# Patient Record
Sex: FEMALE | Race: WHITE | NOT HISPANIC OR LATINO | Employment: OTHER | URBAN - NONMETROPOLITAN AREA
[De-identification: names, ages, dates, MRNs, and addresses within clinical notes are randomized per-mention and may not be internally consistent; named-entity substitution may affect disease eponyms.]

---

## 2017-01-27 RX ORDER — LISINOPRIL 10 MG/1
TABLET ORAL
Qty: 90 TABLET | Refills: 0 | Status: SHIPPED | OUTPATIENT
Start: 2017-01-27 | End: 2017-03-21 | Stop reason: SDUPTHER

## 2017-02-11 ENCOUNTER — APPOINTMENT (OUTPATIENT)
Dept: GENERAL RADIOLOGY | Facility: HOSPITAL | Age: 82
End: 2017-02-11

## 2017-02-11 ENCOUNTER — HOSPITAL ENCOUNTER (EMERGENCY)
Facility: HOSPITAL | Age: 82
Discharge: HOME | End: 2017-02-11
Attending: EMERGENCY MEDICINE | Admitting: EMERGENCY MEDICINE

## 2017-02-11 ENCOUNTER — APPOINTMENT (OUTPATIENT)
Dept: CT IMAGING | Facility: HOSPITAL | Age: 82
End: 2017-02-11

## 2017-02-11 VITALS
WEIGHT: 120 LBS | DIASTOLIC BLOOD PRESSURE: 76 MMHG | HEIGHT: 63 IN | SYSTOLIC BLOOD PRESSURE: 174 MMHG | HEART RATE: 80 BPM | BODY MASS INDEX: 21.26 KG/M2 | RESPIRATION RATE: 16 BRPM | TEMPERATURE: 98.3 F | OXYGEN SATURATION: 94 %
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
## 2017-02-11 DIAGNOSIS — R55 SYNCOPE, UNSPECIFIED SYNCOPE TYPE: ICD-10-CM

## 2017-02-11 LAB
ALBUMIN SERPL-MCNC: 4.1 G/DL (ref 3.5–5)
ALBUMIN/GLOB SERPL: 1.3 G/DL (ref 1–2)
ALP SERPL-CCNC: 94 U/L (ref 38–126)
ALT SERPL W P-5'-P-CCNC: 21 U/L (ref 13–69)
ANION GAP SERPL CALCULATED.3IONS-SCNC: 11.4 MMOL/L
AST SERPL-CCNC: 34 U/L (ref 15–46)
BASOPHILS # BLD AUTO: 0.02 10*3/MM3 (ref 0–0.2)
BASOPHILS NFR BLD AUTO: 0.2 % (ref 0–2.5)
BILIRUB SERPL-MCNC: 1 MG/DL (ref 0.2–1.3)
BILIRUB UR QL STRIP: NEGATIVE
BUN BLD-MCNC: 17 MG/DL (ref 7–20)
BUN/CREAT SERPL: 24.3 (ref 7.1–23.5)
CALCIUM SPEC-SCNC: 9.2 MG/DL (ref 8.4–10.2)
CHLORIDE SERPL-SCNC: 99 MMOL/L (ref 98–107)
CLARITY UR: CLEAR
CO2 SERPL-SCNC: 32 MMOL/L (ref 26–30)
COLOR UR: YELLOW
CREAT BLD-MCNC: 0.7 MG/DL (ref 0.6–1.3)
DEPRECATED RDW RBC AUTO: 50.5 FL (ref 37–54)
EOSINOPHIL # BLD AUTO: 0.02 10*3/MM3 (ref 0–0.7)
EOSINOPHIL NFR BLD AUTO: 0.2 % (ref 0–7)
ERYTHROCYTE [DISTWIDTH] IN BLOOD BY AUTOMATED COUNT: 15.1 % (ref 11.5–14.5)
GFR SERPL CREATININE-BSD FRML MDRD: 78 ML/MIN/1.73
GLOBULIN UR ELPH-MCNC: 3.2 GM/DL
GLUCOSE BLD-MCNC: 125 MG/DL (ref 74–98)
GLUCOSE UR STRIP-MCNC: NEGATIVE MG/DL
HCT VFR BLD AUTO: 39.2 % (ref 37–47)
HGB BLD-MCNC: 12.8 G/DL (ref 12–16)
HGB UR QL STRIP.AUTO: NEGATIVE
HOLD SPECIMEN: NORMAL
HOLD SPECIMEN: NORMAL
IMM GRANULOCYTES # BLD: 0.05 10*3/MM3 (ref 0–0.06)
IMM GRANULOCYTES NFR BLD: 0.5 % (ref 0–0.6)
KETONES UR QL STRIP: ABNORMAL
LEUKOCYTE ESTERASE UR QL STRIP.AUTO: NEGATIVE
LYMPHOCYTES # BLD AUTO: 0.84 10*3/MM3 (ref 0.6–3.4)
LYMPHOCYTES NFR BLD AUTO: 9.2 % (ref 10–50)
MCH RBC QN AUTO: 29.7 PG (ref 27–31)
MCHC RBC AUTO-ENTMCNC: 32.7 G/DL (ref 30–37)
MCV RBC AUTO: 91 FL (ref 81–99)
MONOCYTES # BLD AUTO: 0.72 10*3/MM3 (ref 0–0.9)
MONOCYTES NFR BLD AUTO: 7.9 % (ref 0–12)
NEUTROPHILS # BLD AUTO: 7.52 10*3/MM3 (ref 2–6.9)
NEUTROPHILS NFR BLD AUTO: 82 % (ref 37–80)
NITRITE UR QL STRIP: NEGATIVE
NRBC BLD MANUAL-RTO: 0 /100 WBC (ref 0–0)
PH UR STRIP.AUTO: 6 [PH] (ref 5–8)
PLATELET # BLD AUTO: 251 10*3/MM3 (ref 130–400)
PMV BLD AUTO: 10 FL (ref 6–12)
POTASSIUM BLD-SCNC: 4.4 MMOL/L (ref 3.5–5.1)
PROT SERPL-MCNC: 7.3 G/DL (ref 6.3–8.2)
PROT UR QL STRIP: NEGATIVE
RBC # BLD AUTO: 4.31 10*6/MM3 (ref 4.2–5.4)
SODIUM BLD-SCNC: 138 MMOL/L (ref 137–145)
SP GR UR STRIP: 1.01 (ref 1–1.03)
TROPONIN I SERPL-MCNC: <0.012 NG/ML (ref 0–0.03)
UROBILINOGEN UR QL STRIP: ABNORMAL
WBC NRBC COR # BLD: 9.17 10*3/MM3 (ref 4.8–10.8)
WHOLE BLOOD HOLD SPECIMEN: NORMAL
WHOLE BLOOD HOLD SPECIMEN: NORMAL

## 2017-02-11 PROCEDURE — 85025 COMPLETE CBC W/AUTO DIFF WBC: CPT | Performed by: EMERGENCY MEDICINE

## 2017-02-11 PROCEDURE — 84484 ASSAY OF TROPONIN QUANT: CPT | Performed by: EMERGENCY MEDICINE

## 2017-02-11 PROCEDURE — 71020 HC CHEST PA AND LATERAL: CPT

## 2017-02-11 PROCEDURE — 72125 CT NECK SPINE W/O DYE: CPT

## 2017-02-11 PROCEDURE — 81003 URINALYSIS AUTO W/O SCOPE: CPT | Performed by: EMERGENCY MEDICINE

## 2017-02-11 PROCEDURE — 70450 CT HEAD/BRAIN W/O DYE: CPT

## 2017-02-11 PROCEDURE — 93005 ELECTROCARDIOGRAM TRACING: CPT | Performed by: EMERGENCY MEDICINE

## 2017-02-11 PROCEDURE — 99284 EMERGENCY DEPT VISIT MOD MDM: CPT

## 2017-02-11 PROCEDURE — 80053 COMPREHEN METABOLIC PANEL: CPT | Performed by: EMERGENCY MEDICINE

## 2017-02-11 RX ORDER — HYDROCODONE BITARTRATE AND ACETAMINOPHEN 5; 325 MG/1; MG/1
1 TABLET ORAL ONCE
Status: COMPLETED | OUTPATIENT
Start: 2017-02-11 | End: 2017-02-11

## 2017-02-11 RX ORDER — SODIUM CHLORIDE 0.9 % (FLUSH) 0.9 %
10 SYRINGE (ML) INJECTION AS NEEDED
Status: DISCONTINUED | OUTPATIENT
Start: 2017-02-11 | End: 2017-02-11 | Stop reason: HOSPADM

## 2017-02-11 RX ORDER — NAPROXEN 375 MG/1
375 TABLET ORAL 2 TIMES DAILY PRN
Qty: 14 TABLET | Refills: 0 | Status: SHIPPED | OUTPATIENT
Start: 2017-02-11 | End: 2017-08-21

## 2017-02-11 RX ADMIN — HYDROCODONE BITARTRATE AND ACETAMINOPHEN 1 TABLET: 5; 325 TABLET ORAL at 16:26

## 2017-02-11 NOTE — ED PROVIDER NOTES
TRIAGE CHIEF COMPLAINT:   Chief Complaint   Patient presents with   • Fall      HPI: Adelia Maurice is a 89 y.o. female who presents to the emergency department complaining of a fall or syncopal event earlier this morning.  Patient states she just woke up on the floor.  Does not recall becoming lightheaded or dizzy, does not recall any chest pain or shortness of breath.  Has not been sick lately.  Family states that she has had a few falls over the past few weeks.  They're unsure of the cause.  They state at one point they know she tripped over her foot and fell but are unsure what happened today.  Patient did hit the back of her head on the floor.  Patient's only complaint is slight headache on arrival.  She does take aspirin at home.       REVIEW OF SYSTEMS: Otherwise negative unless stated above     PAST MEDICAL HISTORY:   Past Medical History   Diagnosis Date   • Dementia    • Dyspnea    • Hypertension    • Macular degeneration 6/23/2016   • Syncope         FAMILY HISTORY:   Family History   Problem Relation Age of Onset   • Cancer Mother    • Diabetes Mother    • Heart attack Father    • Cancer Sister         SOCIAL HISTORY:   Social History     Social History   • Marital status:      Spouse name: N/A   • Number of children: N/A   • Years of education: N/A     Occupational History   • Not on file.     Social History Main Topics   • Smoking status: Never Smoker   • Smokeless tobacco: Not on file   • Alcohol use No   • Drug use: No   • Sexual activity: Not on file     Other Topics Concern   • Not on file     Social History Narrative   • No narrative on file        SURGICAL HISTORY:   Past Surgical History   Procedure Laterality Date   • Appendectomy     • Colon surgery          CURRENT MEDICATIONS:      Medication List      ASK your doctor about these medications          amLODIPine 5 MG tablet   Commonly known as:  NORVASC   Take 1 tablet by mouth Daily.       aspirin 325 MG tablet       citalopram 20  MG tablet   Commonly known as:  CeleXA   Take 1 tablet by mouth daily.       donepezil 10 MG tablet   Commonly known as:  ARICEPT   Take 2 tablets by mouth Every Night.       latanoprost 0.005 % ophthalmic solution   Commonly known as:  XALATAN       lisinopril 10 MG tablet   Commonly known as:  PRINIVIL,ZESTRIL   TAKE ONE TABLET BY MOUTH DAILY       PRESERVISION AREDS PO       Turmeric Curcumin 500 MG capsule            ALLERGIES: Other     PHYSICAL EXAM:   VITAL SIGNS:   Vitals:    02/11/17 1148   BP: 169/65   Pulse: 75   Resp: 16   Temp: 98.3 °F (36.8 °C)   SpO2: 96%      CONSTITUTIONAL: Awake, oriented, appears non-toxic   HENT: There is a small soft tissue hematoma on the posterior scalp, normocephalic, oral mucosa pink and moist, airway patent. Nares patent without drainage. External ears normal.   EYES: Conjunctiva clear, EOMI, PERRL   NECK: Trachea midline, non-tender, supple   CARDIOVASCULAR: Normal heart rate, Normal rhythm, No murmurs, rubs, gallops   PULMONARY/CHEST: Clear to auscultation, no rhonchi, wheezes, or rales. Symmetrical breath sounds. Non-tender.   ABDOMINAL: Non-distended, soft, non-tender - no rebound or guarding. BS normal.   NEUROLOGIC: Non-focal, moving all four extremities, no gross sensory or motor deficits.  Normal strength and sensation in upper and lower extremities bilaterally.  Face is symmetrical with smiling, no facial droop noted, finger to nose testing appropriate.  EXTREMITIES: No clubbing, cyanosis, or edema   SKIN: Warm, Dry, No erythema, No rash     ED COURSE / MEDICAL DECISION MAKING:   Adelia Maurice is a 89 y.o. female who presents to the emergency department for evaluation of a fall and/or syncopal event earlier today.  Patient is asymptomatic on arrival.  Exam reveals a slight contusion on the posterior scalp, no laceration or abrasion is seen in this area.  EKG was obtained on arrival and reveals sinus rhythm with a rate of 72 bpm.  There are no acute ST segment  or T-wave abnormalities.  We'll obtain labs and imaging for further evaluation.  Chest x-ray per my interpretation is unremarkable.  CT scan of the cervical spine and the head did not reveal any acute abnormalities.  Laboratory tests including a urinalysis and cardiac enzymes are clinically unremarkable.  Patient observed in the emergency department with no further episodes.  Is difficult to say whether patient fell down or had a syncopal episode.  Patient does have a history of some venous stasis and has had some issues with falling before.  Given patient remained stable I think she is safe for discharged home but have asked patient follow-up with her cardiologist as well as her primary care physician within the next few days.  Family patient were agreeable to this plan and were discharged in good condition.    DECISION TO DISCHARGE/ADMIT: see ED care timeline     FINAL IMPRESSION:   1 -- syncope   2 --   3 --     Electronically signed by: Jazmyne Mcintyre MD, 2/11/2017 12:11 PM       Jazmyne Mcintyre MD  02/11/17 1555

## 2017-02-11 NOTE — DISCHARGE INSTRUCTIONS
"Most recent vital signs:   Visit Vitals   • /76   • Pulse 80   • Temp 98.3 °F (36.8 °C) (Oral)   • Resp 16   • Ht 63\" (160 cm)   • Wt 120 lb (54.4 kg)   • SpO2 94%   • BMI 21.26 kg/m2        Below you fill find any summaries of imaging results and further discharge instructions as discussed during your visit.     CT Cervical Spine Without Contrast   Final Result   Authenticated by Ashok Grigsby MD on 02/11/2017 01:42:28 PM      CT Head Without Contrast   Final Result   No acute intracranial abnormality.      Authenticated by Ashok Grigsby MD on 02/11/2017 01:23:36 PM      XR Chest 2 View    (Results Pending)        --PLEASE READ THESE DIRECTIONS IN FULL--     Our goal is to provide the best care we can AND to find any medical or surgical emergency while you are in the ER. If a medical or surgical emergency was not identified during your visit (or if the issue was resolved during the visit), following these directions for follow-up with a primary care provider and/or specialists and following the return precautions will be the safest and most effective way to protect your health after leaving the emergency room.     Therefore, in addition to the conversation we had in the room, please read all of the information in these discharge instructions, take medications as directed, and follow-up as directed.     You should seek immediate medical help for:     Worsening pain that is not helped with prescribed pain medicines and/or the recommended doses of over the counter pain medicines such as acetaminophen (Tylenol) or ibuprofen (Motrin/Advil)*.   New or worsening chest pain or trouble breathing   New or worsening headache, confusion, weakness, or visual changes   New or worsening fever (>100.4 F) that does not improve with the recommended doses of over the counter fever treatments such as acetaminophen (Tylenol) or ibuprofen (Motrin/Advil)* for more than a few hours.   Any other concerns that you feel could " "be life, limb, or eye-sight threatening     As a reminder, if you received any medicines or prescriptions for medicines that may be sedating (\"narcotics\", \"opioids\"), do NOT:   - operate any vehicles   - take if you are already tired   - take if you have had or plan to have alcohol   - perform other responsibilities that require your full attention.     Common medications include, but are not limited to:   Opiates: Morphine, Norco, Lortab, Vicodin, Percocet, oxycodone, hydrocodone, Dilaudid, hydromorphone   Benzodiazepines: Ativan, Valium, alprazolam, lorazepam, diazepam,   Other sedating medications: promethazine, Phenergan, trazodone, Benadryl, hydroxyzine, Vistaril, diphenhydramine, zolpidem, and Ambien     *If you have any history of GI (stomach/intestinal) bleeding, allergic reactions, kidney problems, and/or certain heart problems or there is any chance you could be pregnant, and have been told to avoid NSAIDs (ibuprofen, naproxen, Aleve, Motrin, Advil) please avoid these medications. Please remember that prescribed pain medicines often contain Tylenol/acetaminophen, so make sure your total daily (24 hour) intake does not exceed 4 grams or 4000mg.    "

## 2017-02-15 ENCOUNTER — OFFICE VISIT (OUTPATIENT)
Dept: INTERNAL MEDICINE | Facility: CLINIC | Age: 82
End: 2017-02-15

## 2017-02-15 ENCOUNTER — OFFICE VISIT (OUTPATIENT)
Dept: CARDIOLOGY | Facility: CLINIC | Age: 82
End: 2017-02-15

## 2017-02-15 VITALS
DIASTOLIC BLOOD PRESSURE: 72 MMHG | BODY MASS INDEX: 20.48 KG/M2 | WEIGHT: 115.6 LBS | HEART RATE: 76 BPM | HEIGHT: 63 IN | SYSTOLIC BLOOD PRESSURE: 120 MMHG

## 2017-02-15 VITALS
BODY MASS INDEX: 20.62 KG/M2 | WEIGHT: 116.38 LBS | OXYGEN SATURATION: 94 % | TEMPERATURE: 97.8 F | HEART RATE: 75 BPM | HEIGHT: 63 IN | SYSTOLIC BLOOD PRESSURE: 120 MMHG | DIASTOLIC BLOOD PRESSURE: 70 MMHG

## 2017-02-15 DIAGNOSIS — I10 ESSENTIAL HYPERTENSION: Primary | ICD-10-CM

## 2017-02-15 DIAGNOSIS — R55 SYNCOPE AND COLLAPSE: Primary | ICD-10-CM

## 2017-02-15 DIAGNOSIS — I10 ESSENTIAL HYPERTENSION: ICD-10-CM

## 2017-02-15 DIAGNOSIS — R55 SYNCOPE, UNSPECIFIED SYNCOPE TYPE: ICD-10-CM

## 2017-02-15 PROCEDURE — 99214 OFFICE O/P EST MOD 30 MIN: CPT | Performed by: INTERNAL MEDICINE

## 2017-02-15 PROCEDURE — 99213 OFFICE O/P EST LOW 20 MIN: CPT | Performed by: INTERNAL MEDICINE

## 2017-02-15 PROCEDURE — 96127 BRIEF EMOTIONAL/BEHAV ASSMT: CPT | Performed by: INTERNAL MEDICINE

## 2017-02-15 RX ORDER — AMLODIPINE BESYLATE 2.5 MG/1
2.5 TABLET ORAL DAILY
Qty: 30 TABLET | Refills: 5 | Status: SHIPPED | OUTPATIENT
Start: 2017-02-15 | End: 2017-06-20 | Stop reason: SDUPTHER

## 2017-02-15 NOTE — PROGRESS NOTES
Chief Complaint   Patient presents with   • Follow-up     from ER visit for multiple falls. Dr. Velez has suggested that Pt takes 2.5mg of Amlodpine.      Subjective   Adelia Maurice is a 89 y.o. female.     HPI Comments: Pt is here for ER followup after syncopal episode and fall.  She was standing at her kitchen counter making a cup of coffee and next thing she knew she was laying on her back on the floor.  She denies any recollection of this event.  She denies any chest pain or palpitations prior to event.  Her son came over and found her lying on the floor just awakening from event.  Apparently she has had a few falls over past few weeks.  Fell out of bed one day.  If she gets up to fast she falls backwards.    She often has dizziness with changes in position.  One fall was due to tripping over her own foot.  Son states she has been drinking more fluids.  She has been eating regular meals throughout the day.  Family checks on her frequently.   ER did full workup including CT of head and cervical spine, EKG, cardiac labs, UA, other labs- all were normal/negative for any cause, although CT cervical spine does show severe DDD of spine.  She was seen by Dr Velez this morning and he feels this could be due to orthostatic hypotension and recommends decrease in amlodipine to 2.5 mg q day.    She is complaining of pain over her right chest wall.   Has a bruise over back of her head.  Has some pain between shoulder blades.  She is SOB at times.  No CP or edema.     PHQ 9 score is 11 today.  Pt is on celexa for depression.   She denies depression, but her son says she often states she would be better if she went on.  Son states she is bothered by her memory.         The following portions of the patient's history were reviewed and updated as appropriate: allergies, current medications, past family history, past medical history, past social history, past surgical history and problem list.    Review of Systems  "  Respiratory: Negative for shortness of breath.    Cardiovascular: Negative for chest pain, palpitations and leg swelling.   Musculoskeletal:        Right chest wall pain  Pain between shoulder blades and mid back   Neurological: Positive for dizziness.   Psychiatric/Behavioral: Positive for confusion and dysphoric mood.   All other systems reviewed and are negative.      Objective   Visit Vitals   • /70  Comment: standing   • Pulse 75   • Temp 97.8 °F (36.6 °C)   • Ht 63\" (160 cm)   • Wt 116 lb 6 oz (52.8 kg)   • SpO2 94%   • BMI 20.61 kg/m2     Body mass index is 20.61 kg/(m^2).  Physical Exam   Constitutional: She is oriented to person, place, and time. She appears well-developed and well-nourished.   HENT:   Head: Normocephalic and atraumatic.   Mouth/Throat: Oropharynx is clear and moist.   Eyes: Conjunctivae and EOM are normal. Pupils are equal, round, and reactive to light.   Neck: Normal range of motion. Neck supple. No thyromegaly present.   Cardiovascular: Normal rate, regular rhythm, normal heart sounds and intact distal pulses.    No murmur heard.  Pulmonary/Chest: Effort normal and breath sounds normal. No respiratory distress.   Abdominal: Soft. Bowel sounds are normal.   Musculoskeletal: She exhibits no edema.   Right upper chest wall pain over the third fourth and fifth costochondral joints   Lymphadenopathy:     She has no cervical adenopathy.   Neurological: She is alert and oriented to person, place, and time. No cranial nerve deficit.   Skin:   Approximately 3 cm hematoma noted over right posterior scalp with surrounding ecchymosis   Psychiatric: Judgment normal.   Flat affect and depressed mood   Nursing note and vitals reviewed.      Assessment/Plan   Adelia Maurice is here today and the following problems have been addressed:      Adelia was seen today for follow-up.    Diagnoses and all orders for this visit:    Essential hypertension    Syncope, unspecified syncope type    Other " orders  -     amLODIPine (NORVASC) 2.5 MG tablet; Take 1 tablet by mouth Daily.    Decrease amlodipine to 2.5 mg q day  Check BP often and record  Apply heat to head and chest wall  Change positions slowly  Increase fluids  PH Q9 score is 11, patient is currently on Celexa at 20 mg and does not want to change dose    RTC 4 weeks

## 2017-02-15 NOTE — PROGRESS NOTES
Adelia POPE Maurice  4/29/1922  [phone]  [phone]    02/15/17     PCP: Marilyn K. Vermeesch, MD  [address] [place] [id number]    IDENTIFICATION: A 89 y.o. female retired [employer] , , and [facility]aker [facility] [place].    Chief Complaint   Patient presents with   • Loss of Consciousness       Patient Active Problem List    Diagnosis   • BMI 29.0-29.9,adult [Z68.29]   • Macular degeneration [H35.30]   • Arthritis of hip [M19.90]   • Breast pain, left [N64.4]   • Carotid bruit [R09.89]   • Fatigue [R53.83]   • Limb pain [M79.609]   • Depression [F32.9]   • Essential hypertension [I10]   • Memory loss [R41.3]   • Neuralgia [M79.2]   • Neck pain, acute [M54.2]     PROBLEM LIST:  1. Dyspnea:  a. June 2011: D-dimer 1700 with negative CTA for pulmonary embolus.   2. Syncope and collapse:  a. Holter, June 2011 with occasional PAC/PVC, no tachy or bradyarrhythmias to account of syncopal episode.  B. Carotid duplex 12/15: Bilateral mild-mod. Plaque without hemodynamically significant stenosis.  C. Syncope and fall 2/11/17, eval at [facility] home.  3. Hypertension, presumed essential.  4. Unknown lipid status.  5. Remote valve surgery, 1996 and 2007.    Allergies  Allergies   Allergen Reactions   • Other        Current Medications    Current Outpatient Prescriptions:   •  amLODIPine (NORVASC) 5 MG tablet, Take 1 tablet by mouth Daily., Disp: 30 tablet, Rfl: 5  •  aspirin 325 MG tablet, Take 162.5 mg by mouth daily., Disp: , Rfl:   •  citalopram (CeleXA) 20 MG tablet, Take 1 tablet by mouth daily. (Patient taking differently: Take 20 mg by mouth 2 (Two) Times a Day.), Disp: 30 tablet, Rfl: 5  •  donepezil (ARICEPT) 10 MG tablet, Take 2 tablets by mouth Every Night., Disp: 60 tablet, Rfl: 5  •  latanoprost (XALATAN) 0.005 % ophthalmic solution, Administer 1 drop to both eyes every night., Disp: , Rfl:   •  lisinopril (PRINIVIL,ZESTRIL) 10 MG tablet, TAKE ONE TABLET BY  MOUTH DAILY, Disp: 90 tablet, Rfl: 0  •  Multiple Vitamins-Minerals (PRESERVISION AREDS PO), Take  by mouth daily., Disp: , Rfl:   •  naproxen (NAPROSYN) 375 MG tablet, Take 1 tablet by mouth 2 (Two) Times a Day As Needed for mild pain (1-3)., Disp: 14 tablet, Rfl: 0  •  Turmeric Curcumin 500 MG capsule, Take  by mouth daily., Disp: , Rfl:     History of Present Illness   HPI  Patient presents in routine follow up.  Unfortunately had a syncopal episode with fall on Saturday, 02/11/2017 and reported to [facility] emergency department.  Patient reports that Saturday morning she was fixing a cup of coffee at her kitchen counter and then remembers waking up on her back on the floor.  She does not recall prodromal symptoms of palpitations, nausea, or diaphoresis.  She states that she was in her normal state of health prior to her fall.  She suffered a contusion to the back of her head.  She reports no CVA symptomatology is.  She does have the occasional skipped heartbeat but this is her normal and is unchanged at this time.  She does report body aches and chest discomfort and her thoracic wall, anterior thorax near right clavicle that is palpable.  She had no rib fractures on chest x-ray.  Otherwise she denies dyspnea, dyspnea on exertion, or significant lower extremity edema.  She had been inconsistent with her compression stocking use prior to the fall, but son present with her today states that she's been wearing them daily.  She does drink plenty of fluids.  She did not miss any meals.  No new change in medications.  She does report frequent presyncopal symptoms with positional changes especially from lying to sitting up.    ROS:  All systems have been reviewed and are negative with the exception of those mentioned in the HPI.    OBJECTIVE:  Vitals:    02/15/17 0909 02/15/17 0910   BP: 128/78 120/72   BP Location: Right arm Right arm   Patient Position: Sitting Standing   Pulse: 76    Weight: 115 lb 9.6  "oz (52.4 kg)    Height: 63\" (160 cm)      Physical Exam   Constitutional: She is oriented to person, place, and time. She appears well-developed and well-nourished.   Neck: Normal range of motion. Neck supple. No hepatojugular reflux and no JVD present. Carotid bruit is not present. No tracheal deviation present. No thyromegaly present.   Cardiovascular: Normal rate, regular rhythm, S1 normal, S2 normal, intact distal pulses and normal pulses.   Occasional extrasystoles are present. PMI is not displaced.  Exam reveals no gallop, no distant heart sounds, no friction rub, no midsystolic click and no opening snap.    No murmur heard.  Pulses:       Radial pulses are 2+ on the right side, and 2+ on the left side.        Dorsalis pedis pulses are 2+ on the right side, and 2+ on the left side.        Posterior tibial pulses are 2+ on the right side, and 2+ on the left side.   Pulmonary/Chest: Effort normal and breath sounds normal. She has no wheezes. She has no rales.   Abdominal: Soft. Bowel sounds are normal. She exhibits no mass. There is no tenderness. There is no guarding.   Musculoskeletal: Normal range of motion. She exhibits no edema or tenderness.   Varicosities bilateral lower extremities.   Neurological: She is alert and oriented to person, place, and time.   Psychiatric: She has a normal mood and affect.   Nursing note and vitals reviewed.      Diagnostic Data:  Procedures  Reviewed lab data and ECG from her ED visit on 2/11/17.    ASSESSMENT:   Diagnosis Plan   1. Syncope and collapse     2. Essential hypertension         PLAN:  1. No signs of heart block or arrhythmia on ECG from ED on 2/11/17.  No specific findings on laboratory data to explain symptoms.  She has a long history of orthostatic hypotension and reports symptoms often with positional changes. I believe we need to provide permissive hypertension to prevent further recurrence. I provided written instructions to cut her amlodipine in half and " continue fluids and compression hose.   2. Would allow systolic pressure to range from 115 to 160 to prevent recurrent syncope. If pressures continue to stay low will eliminate amlodipine from her regimen, but may need to restart full dose if pressures consistently higher than 160.    Follow up with us in 6 months    Scribed for Avi Velez MD by DEJAN Sherman. 2/15/2017  10:04 AM   I, Avi Velez MD, personally performed the services described in this documentation as scribed by the above named individual in my presence, and it is both accurate and complete.  2/15/2017  10:59 AM    Marilyn K. Vermeesch, MD, thank you for referring Ms. Maurice for evaluation.  I have forwarded my electronically generated recommendations to you for review.  Please do not hesitate to call with any questions.      Avi Velez MD, FACC

## 2017-03-21 ENCOUNTER — OFFICE VISIT (OUTPATIENT)
Dept: INTERNAL MEDICINE | Facility: CLINIC | Age: 82
End: 2017-03-21

## 2017-03-21 VITALS
HEART RATE: 80 BPM | BODY MASS INDEX: 20.4 KG/M2 | HEIGHT: 63 IN | SYSTOLIC BLOOD PRESSURE: 140 MMHG | WEIGHT: 115.13 LBS | DIASTOLIC BLOOD PRESSURE: 50 MMHG | TEMPERATURE: 97.7 F | OXYGEN SATURATION: 95 %

## 2017-03-21 DIAGNOSIS — F32.A DEPRESSION, UNSPECIFIED DEPRESSION TYPE: ICD-10-CM

## 2017-03-21 DIAGNOSIS — I10 ESSENTIAL HYPERTENSION: Primary | ICD-10-CM

## 2017-03-21 PROCEDURE — 99213 OFFICE O/P EST LOW 20 MIN: CPT | Performed by: INTERNAL MEDICINE

## 2017-03-21 RX ORDER — LISINOPRIL 10 MG/1
10 TABLET ORAL DAILY
Qty: 90 TABLET | Refills: 1 | Status: SHIPPED | OUTPATIENT
Start: 2017-03-21 | End: 2017-06-20 | Stop reason: SDUPTHER

## 2017-03-21 RX ORDER — CITALOPRAM 20 MG/1
20 TABLET ORAL DAILY
Qty: 30 TABLET | Refills: 5 | Status: SHIPPED | OUTPATIENT
Start: 2017-03-21 | End: 2017-06-20 | Stop reason: SDUPTHER

## 2017-03-21 RX ORDER — DONEPEZIL HYDROCHLORIDE 10 MG/1
20 TABLET, FILM COATED ORAL NIGHTLY
Qty: 60 TABLET | Refills: 5 | Status: SHIPPED | OUTPATIENT
Start: 2017-03-21 | End: 2017-06-20 | Stop reason: SDUPTHER

## 2017-03-21 NOTE — PROGRESS NOTES
"Chief Complaint   Patient presents with   • Follow-up     4 weeks for HTN and depression. No new complaints.      Subjective   Adelia Maurice is a 89 y.o. female.     HPI Comments: Here for follow up of HTN and depression.   Last visit her amlodipine had been decreased to 2.5 mg q day due to orthostatic hypotension and risk of falls.  SBP is elevated today.  Home reads are running 156-205/60-80.    She still feels off balance with changes in position.  No further falls.  She does notice much difference in how she feels.  Her son notices she is more steady on her feet.  She is no longer on walker and is using a cane.  She does feel more steady on her feet.   No CP or SOB.  She does have RAMIREZ.  She does not do much activity, lays in bed a lot per son.   Son cannot remember her last pneumovax.   Her son feels her depression is improved with celexa.  She does not like to do puzzles/crosswords/reading to help memory.        The following portions of the patient's history were reviewed and updated as appropriate: allergies, current medications, past family history, past medical history, past social history, past surgical history and problem list.    Review of Systems   Respiratory:        Dyspnea on exertion   Cardiovascular: Negative for chest pain, palpitations and leg swelling.   Neurological:        Occasional loss of balance, however this is improved   Psychiatric/Behavioral: Positive for confusion.        Less depression and anxiety   All other systems reviewed and are negative.      Objective   Visit Vitals   • /50   • Pulse 80   • Temp 97.7 °F (36.5 °C)   • Ht 63\" (160 cm)   • Wt 115 lb 2 oz (52.2 kg)   • SpO2 95%   • BMI 20.39 kg/m2     Body mass index is 20.39 kg/(m^2).  Physical Exam   Constitutional: She appears well-developed and well-nourished.   Ambulates with use of a cane   HENT:   Head: Normocephalic and atraumatic.   Mouth/Throat: Oropharynx is clear and moist.   Eyes: Conjunctivae and EOM are " normal. Pupils are equal, round, and reactive to light.   Neck: Normal range of motion. Neck supple. No thyromegaly present.   Cardiovascular: Normal rate, regular rhythm, normal heart sounds and intact distal pulses.    No murmur heard.  Pulmonary/Chest: Effort normal and breath sounds normal. No respiratory distress.   Musculoskeletal: She exhibits no edema.   Lymphadenopathy:     She has no cervical adenopathy.   Neurological: She is alert. No cranial nerve deficit.   Psychiatric: Judgment normal.   Brighter affect and mood today   Nursing note and vitals reviewed.      Assessment/Plan   Adelia Maurice is here today and the following problems have been addressed:      Adelia was seen today for follow-up.    Diagnoses and all orders for this visit:    Essential hypertension    Depression, unspecified depression type    Other orders  -     donepezil (ARICEPT) 10 MG tablet; Take 2 tablets by mouth Every Night.  -     lisinopril (PRINIVIL,ZESTRIL) 10 MG tablet; Take 1 tablet by mouth Daily.  -     citalopram (CeleXA) 20 MG tablet; Take 1 tablet by mouth Daily.    Follow heart healthy/low salt diet  Avoid processed foods  Monitor blood pressure as discussed  Take all medications as prescribed  Depression appears improved on current med  Recommend work on puzzles/read  Will try to find pneumovax documentation in old chart  Son is to bring in home blood pressure cuff to check for accuracy    RTC 3 mo      Please note that portions of this note were completed with a voice recognition program.  Efforts were made to edit dictation, but occasionally words are mistranscribed.

## 2017-05-15 ENCOUNTER — TELEPHONE (OUTPATIENT)
Dept: INTERNAL MEDICINE | Facility: CLINIC | Age: 82
End: 2017-05-15

## 2017-06-20 ENCOUNTER — OFFICE VISIT (OUTPATIENT)
Dept: INTERNAL MEDICINE | Facility: CLINIC | Age: 82
End: 2017-06-20

## 2017-06-20 VITALS
WEIGHT: 116.13 LBS | HEART RATE: 70 BPM | DIASTOLIC BLOOD PRESSURE: 52 MMHG | SYSTOLIC BLOOD PRESSURE: 136 MMHG | OXYGEN SATURATION: 94 % | TEMPERATURE: 97.2 F | HEIGHT: 63 IN | BODY MASS INDEX: 20.58 KG/M2

## 2017-06-20 DIAGNOSIS — Z91.09 ENVIRONMENTAL ALLERGIES: ICD-10-CM

## 2017-06-20 DIAGNOSIS — F32.0 MILD SINGLE CURRENT EPISODE OF MAJOR DEPRESSIVE DISORDER (HCC): ICD-10-CM

## 2017-06-20 DIAGNOSIS — I10 ESSENTIAL HYPERTENSION: Primary | ICD-10-CM

## 2017-06-20 DIAGNOSIS — R41.3 MEMORY LOSS: ICD-10-CM

## 2017-06-20 PROCEDURE — 90732 PPSV23 VACC 2 YRS+ SUBQ/IM: CPT | Performed by: INTERNAL MEDICINE

## 2017-06-20 PROCEDURE — 99213 OFFICE O/P EST LOW 20 MIN: CPT | Performed by: INTERNAL MEDICINE

## 2017-06-20 PROCEDURE — G0009 ADMIN PNEUMOCOCCAL VACCINE: HCPCS | Performed by: INTERNAL MEDICINE

## 2017-06-20 RX ORDER — AMLODIPINE BESYLATE 2.5 MG/1
2.5 TABLET ORAL DAILY
Qty: 30 TABLET | Refills: 5 | Status: SHIPPED | OUTPATIENT
Start: 2017-06-20 | End: 2017-11-10 | Stop reason: SDUPTHER

## 2017-06-20 RX ORDER — LISINOPRIL 10 MG/1
10 TABLET ORAL DAILY
Qty: 90 TABLET | Refills: 1 | Status: SHIPPED | OUTPATIENT
Start: 2017-06-20 | End: 2018-01-18 | Stop reason: SDUPTHER

## 2017-06-20 RX ORDER — CITALOPRAM 20 MG/1
20 TABLET ORAL DAILY
Qty: 30 TABLET | Refills: 5 | Status: SHIPPED | OUTPATIENT
Start: 2017-06-20 | End: 2018-04-12 | Stop reason: SDUPTHER

## 2017-06-20 RX ORDER — DONEPEZIL HYDROCHLORIDE 10 MG/1
20 TABLET, FILM COATED ORAL NIGHTLY
Qty: 60 TABLET | Refills: 5 | Status: SHIPPED | OUTPATIENT
Start: 2017-06-20 | End: 2018-05-15 | Stop reason: SDUPTHER

## 2017-06-20 NOTE — PROGRESS NOTES
"Chief Complaint   Patient presents with   • Follow-up     3 months for depression and HTN. Son states Pt's nose is constantly running. Also states Pt shakes alot.       Subjective   Adelia Maurice is a 89 y.o. female.     HPI Comments: Here for followup of HTN, depression, dementia.   Her BP has been running better overall.  No lows or highs.  She has not had a fall since last visit.  Has not been having the dizzy spells as she has in the past.   Denies any CP or SOB.  She is wearing her compression stockings, has been sleeping in stockings.    She has been eating 3 meals a day, her weight has remained stable.   Her sister had Parkinsons disease.  She does have a tremor.  This tremor is worse in the mornings.  Has chronic sinus drainage, left worse than right.  She has been sneezing.  She has not been taking any antihistamines or decongestants for this.  Has glaucoma and is on eye drops.   She states she has been feeling depressed.        The following portions of the patient's history were reviewed and updated as appropriate: allergies, current medications, past family history, past medical history, past social history, past surgical history and problem list.    Review of Systems   Constitutional: Positive for fatigue. Negative for appetite change and fever.   HENT: Positive for congestion, postnasal drip and rhinorrhea.    Respiratory: Negative for shortness of breath.    Cardiovascular: Negative for chest pain, palpitations and leg swelling.   Allergic/Immunologic: Positive for environmental allergies.   Neurological: Positive for tremors.   Psychiatric/Behavioral: Positive for confusion and dysphoric mood.   All other systems reviewed and are negative.      Objective   /52  Pulse 70  Temp 97.2 °F (36.2 °C)  Ht 63\" (160 cm)  Wt 116 lb 2 oz (52.7 kg)  SpO2 94%  BMI 20.57 kg/m2  Body mass index is 20.57 kg/(m^2).  Physical Exam   Constitutional: She is oriented to person, place, and time. She appears " well-developed and well-nourished.   HENT:   Head: Normocephalic and atraumatic.   Mouth/Throat: Oropharynx is clear and moist.   Clear watery rhinorrhea, turbinates pale and boggy   Eyes: Conjunctivae and EOM are normal. Pupils are equal, round, and reactive to light.   Neck: Normal range of motion. Neck supple. No thyromegaly present.   Cardiovascular: Normal rate, regular rhythm, normal heart sounds and intact distal pulses.    No murmur heard.  Pulmonary/Chest: Effort normal and breath sounds normal. No respiratory distress.   Abdominal: Soft. Bowel sounds are normal.   Musculoskeletal: She exhibits no edema.   Compression stockings in place with no edema noted   Lymphadenopathy:     She has no cervical adenopathy.   Neurological: She is alert and oriented to person, place, and time. No cranial nerve deficit.   Bilateral hand tremor noted   Psychiatric: Judgment normal.   Flat affect and sad mood   Nursing note and vitals reviewed.      Assessment/Plan   Adelia Maurice is here today and the following problems have been addressed:      Adelia was seen today for follow-up.    Diagnoses and all orders for this visit:    Essential hypertension    Mild single current episode of major depressive disorder    Memory loss    Environmental allergies    Other orders  -     lisinopril (PRINIVIL,ZESTRIL) 10 MG tablet; Take 1 tablet by mouth Daily.  -     donepezil (ARICEPT) 10 MG tablet; Take 2 tablets by mouth Every Night.  -     citalopram (CeleXA) 20 MG tablet; Take 1 tablet by mouth Daily.  -     amLODIPine (NORVASC) 2.5 MG tablet; Take 1 tablet by mouth Daily.    Follow heart healthy/low salt diet  Avoid processed foods  Monitor blood pressure as discussed  Exercise as tolerated up to 30 minutes 5 days per week  Take all medications as prescribed  Recommend Zyrtec 10 mg daily at bedtime  Pneumovax given today  Encouragement and reassurance given    RTC 4 mo    Please note that portions of this note were completed with  a voice recognition program.  Efforts were made to edit dictation, but occasionally words are mistranscribed.

## 2017-06-23 ENCOUNTER — TELEPHONE (OUTPATIENT)
Dept: INTERNAL MEDICINE | Facility: CLINIC | Age: 82
End: 2017-06-23

## 2017-06-23 NOTE — TELEPHONE ENCOUNTER
Denny is wanting to know if it will be okay for his mother to take Melatonin at night to help her sleep. He states that she has her days and nights mixed up and thought this might help. He is concerned about other medications that she is taking. He would like a call back with this information.

## 2017-07-18 ENCOUNTER — TELEPHONE (OUTPATIENT)
Dept: INTERNAL MEDICINE | Facility: CLINIC | Age: 82
End: 2017-07-18

## 2017-07-18 DIAGNOSIS — B37.2 SKIN CANDIDIASIS: ICD-10-CM

## 2017-07-18 NOTE — TELEPHONE ENCOUNTER
PATIENT WENT TO [facility] ON Saturday FOR A YEAST INFECTION, AND THEY GAVE HER FLUCONAZOLE, AND SHE SEEMS TO BE DOING SOME BETTER ON HER FACE, BUT PATIENT'S SON DOESN'T THINK THAT THE MEDICATION IS HELPING AS FAST AS WHAT THE PROVIDER SAID IT WOULD AROUND HER VAGINAL/BUTTOCKS AREA AND LEG. SHE'S BEEN TAKING THE MEDICATION FOR 3 DAYS NOW. HE WOULD LIKE DR. VERMEESCH TO TAKE A LOOK AT WHAT THE PROVIDER SAID REGARDING TREATMENT AND IF THIS IS THE RIGHT APPROACH TO HER PROBLEM. PLEASE RETURN CALL.

## 2017-07-18 NOTE — TELEPHONE ENCOUNTER
It appears as though there was a refill on the tube of cream they were given.  I don't believe she will need anymore Diflucan tablets.

## 2017-07-18 NOTE — TELEPHONE ENCOUNTER
Fungal infections can take 1-2 weeks to clear.  I recommend he be patient and call me back next week if she is not better.  The medications given are correct if this was indeed a fungal infection.

## 2017-07-18 NOTE — TELEPHONE ENCOUNTER
Pt's son notified. Stated they only have one tube of cream and and one more pill. Does she need more of these?

## 2017-07-19 RX ORDER — CLOTRIMAZOLE 1 %
CREAM (GRAM) TOPICAL
Qty: 45 G | Refills: 2 | Status: SHIPPED | OUTPATIENT
Start: 2017-07-19 | End: 2017-08-21

## 2017-07-21 ENCOUNTER — TELEPHONE (OUTPATIENT)
Dept: INTERNAL MEDICINE | Facility: CLINIC | Age: 82
End: 2017-07-21

## 2017-07-25 ENCOUNTER — TELEPHONE (OUTPATIENT)
Dept: INTERNAL MEDICINE | Facility: CLINIC | Age: 82
End: 2017-07-25

## 2017-07-25 NOTE — TELEPHONE ENCOUNTER
PT'S SON IS ASKING IF SHE CAN DISCONTINUE THE CREAM SHE HAS BEEN USING FOR A YEAST INFECTION.    PLEASE ADVISE HIM

## 2017-07-25 NOTE — TELEPHONE ENCOUNTER
Pt's son advised that if the yeast infection is gone she shouldn't need it anymore. Son wanted to know if using a moisture blocking cream would be helpful in preventing this from happening again?

## 2017-08-21 ENCOUNTER — OFFICE VISIT (OUTPATIENT)
Dept: CARDIOLOGY | Facility: CLINIC | Age: 82
End: 2017-08-21

## 2017-08-21 VITALS
DIASTOLIC BLOOD PRESSURE: 58 MMHG | HEART RATE: 73 BPM | SYSTOLIC BLOOD PRESSURE: 170 MMHG | BODY MASS INDEX: 20.73 KG/M2 | WEIGHT: 117 LBS | HEIGHT: 63 IN

## 2017-08-21 DIAGNOSIS — I10 ESSENTIAL HYPERTENSION: Primary | ICD-10-CM

## 2017-08-21 DIAGNOSIS — R60.0 BILATERAL EDEMA OF LOWER EXTREMITY: ICD-10-CM

## 2017-08-21 PROCEDURE — 99213 OFFICE O/P EST LOW 20 MIN: CPT | Performed by: INTERNAL MEDICINE

## 2017-08-21 NOTE — PROGRESS NOTES
Adelia POPE [place]  4/29/1922  [phone]  [phone]    08/21/17     PCP: Marilyn K. Vermeesch, MD  [address] [place] [id number]    IDENTIFICATION: A 89 y.o. female retired [employer] , , and [facility] [place].    Chief Complaint   Patient presents with   • Palpitations   • Shortness of Breath       Patient Active Problem List    Diagnosis   • Environmental allergies [Z91.09]   • Syncope [R55]   • BMI 29.0-29.9,adult [Z68.29]   • Macular degeneration [H35.30]   • Arthritis of hip [M19.90]   • Carotid bruit [R09.89]   • Fatigue [R53.83]   • Depression [F32.9]   • Essential hypertension [I10]   • Memory loss [R41.3]   • Neuralgia [M79.2]     PROBLEM LIST:  1. Dyspnea:  a. June 2011: D-dimer 1700 with negative CTA for pulmonary embolus.   2. Syncope and collapse:  a. Holter, June 2011 with occasional PAC/PVC, no tachy or bradyarrhythmias to account of syncopal episode.  B. Carotid duplex 12/15: Bilateral mild-mod. Plaque without hemodynamically significant stenosis.  C. Syncope and fall 2/11/17, eval at [facility] and [facility] home.  3. Hypertension, presumed essential.  4. Unknown lipid status.  5. Remote valve surgery, 1996 and 2007.    Allergies  Allergies   Allergen Reactions   • Other        Current Medications    Current Outpatient Prescriptions:   •  amLODIPine (NORVASC) 2.5 MG tablet, Take 1 tablet by mouth Daily., Disp: 30 tablet, Rfl: 5  •  aspirin 81 MG tablet, Take 2 tablets by mouth Daily., Disp: , Rfl:   •  cetirizine (zyrTEC) 5 MG tablet, Take 5 mg by mouth Daily., Disp: , Rfl:   •  citalopram (CeleXA) 20 MG tablet, Take 1 tablet by mouth Daily., Disp: 30 tablet, Rfl: 5  •  donepezil (ARICEPT) 10 MG tablet, Take 2 tablets by mouth Every Night., Disp: 60 tablet, Rfl: 5  •  latanoprost (XALATAN) 0.005 % ophthalmic solution, Administer 1 drop to both eyes every night., Disp: , Rfl:   •  lisinopril (PRINIVIL,ZESTRIL) 10 MG tablet, Take 1 tablet by mouth  "Daily., Disp: 90 tablet, Rfl: 1  •  melatonin 5 MG tablet tablet, Take 5 mg by mouth Every Night., Disp: , Rfl:   •  Multiple Vitamins-Minerals (PRESERVISION AREDS PO), Take  by mouth 2 (Two) Times a Day., Disp: , Rfl:   •  Turmeric Curcumin 500 MG capsule, Take  by mouth 2 (Two) Times a Day., Disp: , Rfl:     History of Present Illness   Palpitations    Associated symptoms include shortness of breath.   Shortness of Breath       Patient presents in routine follow up.  Has fallen in the shower twice since last visit but no correlation to hypotension.  Little appetite and her her son is encouraging by mouth intake    ROS:  All systems have been reviewed and are negative with the exception of those mentioned in the HPI.    OBJECTIVE:  Vitals:    08/21/17 1039   BP: 170/58   BP Location: Right arm   Patient Position: Sitting   Pulse: 73   Weight: 117 lb (53.1 kg)   Height: 63\" (160 cm)     Physical Exam   Constitutional: She is oriented to person, place, and time. She appears well-developed and well-nourished.   Neck: Normal range of motion. Neck supple. No hepatojugular reflux and no JVD present. Carotid bruit is not present. No tracheal deviation present. No thyromegaly present.   Cardiovascular: Normal rate, regular rhythm, S1 normal, S2 normal, intact distal pulses and normal pulses.   Occasional extrasystoles are present. PMI is not displaced.  Exam reveals no gallop, no distant heart sounds, no friction rub, no midsystolic click and no opening snap.    No murmur heard.  Pulses:       Radial pulses are 2+ on the right side, and 2+ on the left side.        Dorsalis pedis pulses are 2+ on the right side, and 2+ on the left side.        Posterior tibial pulses are 2+ on the right side, and 2+ on the left side.   Pulmonary/Chest: Effort normal and breath sounds normal. She has no wheezes. She has no rales.   Abdominal: Soft. Bowel sounds are normal. She exhibits no mass. There is no tenderness. There is no guarding. "   Musculoskeletal: Normal range of motion. She exhibits no edema or tenderness.   Varicosities bilateral lower extremities.   Neurological: She is alert and oriented to person, place, and time.   Psychiatric: She has a normal mood and affect.   Nursing note and vitals reviewed.      Diagnostic Data:  Procedures  Reviewed lab data and ECG from her ED visit on 2/11/17.    ASSESSMENT:   Diagnosis Plan   1. Essential hypertension     2. Bilateral edema of lower extremity         PLAN:  1.Hypertension with permissive hypertension a loud systolic status at current.  She states at home typically 150 or lower.    2.  Lower extremity edema recommended zip up compression stockings for ease of use  Plan follow-up in 9 months

## 2017-10-31 ENCOUNTER — OFFICE VISIT (OUTPATIENT)
Dept: INTERNAL MEDICINE | Facility: CLINIC | Age: 82
End: 2017-10-31

## 2017-10-31 VITALS
WEIGHT: 113 LBS | BODY MASS INDEX: 20.02 KG/M2 | DIASTOLIC BLOOD PRESSURE: 68 MMHG | SYSTOLIC BLOOD PRESSURE: 132 MMHG | HEART RATE: 74 BPM | OXYGEN SATURATION: 96 % | TEMPERATURE: 98.1 F | HEIGHT: 63 IN

## 2017-10-31 DIAGNOSIS — Z91.09 ENVIRONMENTAL ALLERGIES: ICD-10-CM

## 2017-10-31 DIAGNOSIS — F32.A DEPRESSION, UNSPECIFIED DEPRESSION TYPE: ICD-10-CM

## 2017-10-31 DIAGNOSIS — I10 ESSENTIAL HYPERTENSION: Primary | ICD-10-CM

## 2017-10-31 DIAGNOSIS — R41.3 MEMORY LOSS: ICD-10-CM

## 2017-10-31 DIAGNOSIS — E55.9 VITAMIN D DEFICIENCY: ICD-10-CM

## 2017-10-31 PROCEDURE — 90662 IIV NO PRSV INCREASED AG IM: CPT | Performed by: INTERNAL MEDICINE

## 2017-10-31 PROCEDURE — G0008 ADMIN INFLUENZA VIRUS VAC: HCPCS | Performed by: INTERNAL MEDICINE

## 2017-10-31 PROCEDURE — 99214 OFFICE O/P EST MOD 30 MIN: CPT | Performed by: INTERNAL MEDICINE

## 2017-10-31 RX ORDER — MEGESTROL ACETATE 125 MG/ML
625 SUSPENSION ORAL DAILY
Qty: 1 EACH | Refills: 3 | Status: SHIPPED | OUTPATIENT
Start: 2017-10-31 | End: 2017-11-01

## 2017-10-31 RX ORDER — IPRATROPIUM BROMIDE 21 UG/1
1 SPRAY, METERED NASAL EVERY 12 HOURS
Qty: 1 EACH | Refills: 11 | Status: SHIPPED | OUTPATIENT
Start: 2017-10-31 | End: 2018-07-02

## 2017-10-31 NOTE — PROGRESS NOTES
"Chief Complaint   Patient presents with   • Follow-up     4 months for depression and HTN. Pt states she tends to not be hungry.     Subjective   Adelia Maurice is a 89 y.o. female.     HPI Comments: Here for followup of HTN, depression, dementia.   She has not been having much appetite for the past 3-4 months.   When she goes out to eat she does well.   She skips lunch if by herself.  She does not like to eat much if family brings it to her either- takes a few bites and says she is not hungry.  She drinks diet soda.   She lives alone, but her sons are there morning and night.  Goes to bed early, but does not sleep well.  She dozes a lot during the day.  She is taking melatonin at night.  Zyrtec does not help her allergies.  Does not sneeze but has a runny nose all the time.   BP has been running 130s/60-70s.  She denies CP, SOB or edema.  She denies abdominal pain, NVDC.  Has had a few episodes of fecal incontinence.   Does have urine incontinence.   Her tremor is about the same.  Has a sister with Parkinsons.  No recent fall.  Remains on Celexa, but does admit to depression.       The following portions of the patient's history were reviewed and updated as appropriate: allergies, current medications, past family history, past medical history, past social history, past surgical history and problem list.    Review of Systems   Constitutional: Positive for appetite change and unexpected weight change.   HENT: Positive for rhinorrhea.    Respiratory: Negative for shortness of breath.    Cardiovascular: Negative for chest pain, palpitations and leg swelling.   Gastrointestinal:        A few episodes of fecal incontinence   Genitourinary:        Urine incontinence   Neurological: Positive for tremors and weakness. Negative for headaches.   Psychiatric/Behavioral: Positive for confusion, dysphoric mood and sleep disturbance.       Objective   /68  Pulse 74  Temp 98.1 °F (36.7 °C)  Ht 63\" (160 cm)  Wt 113 lb " (51.3 kg)  SpO2 96%  BMI 20.02 kg/m2  Body mass index is 20.02 kg/(m^2).  Physical Exam   HENT:   Head: Normocephalic and atraumatic.   Mouth/Throat: Oropharynx is clear and moist.   Clear, watery rhinorrhea   Eyes: EOM are normal. Pupils are equal, round, and reactive to light.   Cardiovascular: Normal rate, regular rhythm, normal heart sounds and intact distal pulses.    No murmur heard.  Pulmonary/Chest: Effort normal and breath sounds normal.   Abdominal: Soft. Bowel sounds are normal. She exhibits no distension. There is no tenderness.   Neurological: She is alert.   Psychiatric:   Flat affect and depressed mood  Patient continues to tell her son she is a burden to him   Nursing note and vitals reviewed.      Assessment/Plan   Adelia Maurice is here today and the following problems have been addressed:      Adelia was seen today for follow-up.    Diagnoses and all orders for this visit:    Essential hypertension  -     CBC & Differential  -     Comprehensive Metabolic Panel    Depression, unspecified depression type  -     TSH    Environmental allergies    Memory loss  -     TSH    Vitamin D deficiency  -     Vitamin D 25 Hydroxy    Other orders  -     megestrol (MEGACE ES) 625 MG/5ML suspension; Take 5 mL by mouth Daily.  -     ipratropium (ATROVENT) 0.03 % nasal spray; 1 spray into each nostril Every 12 (Twelve) Hours.    Labs as noted  Given Atrovent nasal spray 1 spray each near a.m. and p.m. due to chronic allergies and congestion  Given Megace 625 mg once daily due to poor appetite and weight loss  Continue Celexa at current dose, may add Remeron at next office visit  Suspect vitamin D deficiency, will call son with lab results  Follow heart healthy/low salt diet  Avoid processed foods  Monitor blood pressure as discussed  Exercise as tolerated up to 30 minutes 5 days per week  Take all medications as prescribed  Flu shot given today    Return to clinic in 4 weeks for follow-up of weight loss    Please  note that portions of this note were completed with a voice recognition program.  Efforts were made to edit dictation, but occasionally words are mistranscribed.

## 2017-11-01 ENCOUNTER — TELEPHONE (OUTPATIENT)
Dept: INTERNAL MEDICINE | Facility: CLINIC | Age: 82
End: 2017-11-01

## 2017-11-01 RX ORDER — MIRTAZAPINE 7.5 MG/1
7.5 TABLET, FILM COATED ORAL NIGHTLY
Qty: 30 TABLET | Refills: 1 | Status: SHIPPED | OUTPATIENT
Start: 2017-11-01 | End: 2017-11-30 | Stop reason: SINTOL

## 2017-11-01 NOTE — TELEPHONE ENCOUNTER
PATIENT'S SON IS CALLING. HE WOULD LIKE TO KNOW IF THERE'S SOMETHING ELSE FOR HER APPETITE STIMULATE? HE CHECKED WITH THE PHARMACY AND THEY TOLD HIM IT WAS GOING TO BE $625.    PLEASE SEND TO MARGY

## 2017-11-02 LAB
25(OH)D3+25(OH)D2 SERPL-MCNC: 14.1 NG/ML (ref 30–100)
ALBUMIN SERPL-MCNC: 4.3 G/DL (ref 3.2–4.6)
ALBUMIN/GLOB SERPL: 2.2 {RATIO} (ref 1.2–2.2)
ALP SERPL-CCNC: 75 IU/L (ref 39–117)
ALT SERPL-CCNC: 7 IU/L (ref 0–32)
AST SERPL-CCNC: 14 IU/L (ref 0–40)
BASOPHILS # BLD AUTO: 0 X10E3/UL (ref 0–0.2)
BASOPHILS NFR BLD AUTO: 0 %
BILIRUB SERPL-MCNC: 0.5 MG/DL (ref 0–1.2)
BUN SERPL-MCNC: 16 MG/DL (ref 10–36)
BUN/CREAT SERPL: 21 (ref 12–28)
CALCIUM SERPL-MCNC: 8.9 MG/DL (ref 8.7–10.3)
CHLORIDE SERPL-SCNC: 101 MMOL/L (ref 96–106)
CO2 SERPL-SCNC: 26 MMOL/L (ref 18–29)
CREAT SERPL-MCNC: 0.76 MG/DL (ref 0.57–1)
EOSINOPHIL # BLD AUTO: 0.1 X10E3/UL (ref 0–0.4)
EOSINOPHIL NFR BLD AUTO: 2 %
ERYTHROCYTE [DISTWIDTH] IN BLOOD BY AUTOMATED COUNT: 15 % (ref 12.3–15.4)
GFR SERPLBLD CREATININE-BSD FMLA CKD-EPI: 67 ML/MIN/1.73
GFR SERPLBLD CREATININE-BSD FMLA CKD-EPI: 77 ML/MIN/1.73
GLOBULIN SER CALC-MCNC: 2 G/DL (ref 1.5–4.5)
GLUCOSE SERPL-MCNC: 95 MG/DL (ref 65–99)
HCT VFR BLD AUTO: 38.4 % (ref 34–46.6)
HGB BLD-MCNC: 12.4 G/DL (ref 11.1–15.9)
IMM GRANULOCYTES # BLD: 0 X10E3/UL (ref 0–0.1)
IMM GRANULOCYTES NFR BLD: 0 %
LYMPHOCYTES # BLD AUTO: 1.3 X10E3/UL (ref 0.7–3.1)
LYMPHOCYTES NFR BLD AUTO: 25 %
MCH RBC QN AUTO: 30 PG (ref 26.6–33)
MCHC RBC AUTO-ENTMCNC: 32.3 G/DL (ref 31.5–35.7)
MCV RBC AUTO: 93 FL (ref 79–97)
MONOCYTES # BLD AUTO: 0.3 X10E3/UL (ref 0.1–0.9)
MONOCYTES NFR BLD AUTO: 7 %
NEUTROPHILS # BLD AUTO: 3.5 X10E3/UL (ref 1.4–7)
NEUTROPHILS NFR BLD AUTO: 66 %
PLATELET # BLD AUTO: 273 X10E3/UL (ref 150–379)
POTASSIUM SERPL-SCNC: 3.8 MMOL/L (ref 3.5–5.2)
PROT SERPL-MCNC: 6.3 G/DL (ref 6–8.5)
RBC # BLD AUTO: 4.14 X10E6/UL (ref 3.77–5.28)
SODIUM SERPL-SCNC: 142 MMOL/L (ref 134–144)
TSH SERPL DL<=0.005 MIU/L-ACNC: 3.34 UIU/ML (ref 0.45–4.5)
WBC # BLD AUTO: 5.2 X10E3/UL (ref 3.4–10.8)

## 2017-11-03 ENCOUNTER — TELEPHONE (OUTPATIENT)
Dept: INTERNAL MEDICINE | Facility: CLINIC | Age: 82
End: 2017-11-03

## 2017-11-03 RX ORDER — ERGOCALCIFEROL 1.25 MG/1
50000 CAPSULE ORAL WEEKLY
Qty: 20 CAPSULE | Refills: 0 | Status: SHIPPED | OUTPATIENT
Start: 2017-11-03 | End: 2017-11-23

## 2017-11-03 NOTE — TELEPHONE ENCOUNTER
It is an antidepressant that helps sleep and it does stimulate appetite.  Please tell patient that I am looking at common side effects of medication right now and it says appetite increase and weight gain.

## 2017-11-03 NOTE — TELEPHONE ENCOUNTER
----- Message from Marilyn K Vermeesch, MD sent at 11/2/2017 12:21 PM EDT -----  Please call son and tell him that all labs are normal except an extremely low vitamin D level.  Please call in vitamin D 50,000 units to be taken once a week for the next 20 weeks.  Please call in 20 tablets with 0 refills.  When she completes this m  edication she will need to take over-the-counter vitamin D 2000 units daily.

## 2017-11-03 NOTE — TELEPHONE ENCOUNTER
Pt's son left  stating he was advised by Pharmacist that Remeron is not to help appetite. Son requested a call back.

## 2017-11-10 ENCOUNTER — TELEPHONE (OUTPATIENT)
Dept: INTERNAL MEDICINE | Facility: CLINIC | Age: 82
End: 2017-11-10

## 2017-11-10 RX ORDER — AMLODIPINE BESYLATE 2.5 MG/1
2.5 TABLET ORAL DAILY
Qty: 30 TABLET | Refills: 12 | Status: SHIPPED | OUTPATIENT
Start: 2017-11-10 | End: 2018-12-19 | Stop reason: SDUPTHER

## 2017-11-13 ENCOUNTER — TELEPHONE (OUTPATIENT)
Dept: INTERNAL MEDICINE | Facility: CLINIC | Age: 82
End: 2017-11-13

## 2017-11-13 NOTE — TELEPHONE ENCOUNTER
Son states Pt has been confused and having balance issues the last few days. Son thinks it may be due to use of mirtazapine. Son did not give Pt this medication last night.

## 2017-11-13 NOTE — TELEPHONE ENCOUNTER
Please tell him to discontinue this medication.  Other than this and the Megace that is expensive, there are no other medications I can recommend for appetite stimulant.

## 2017-11-28 ENCOUNTER — TELEPHONE (OUTPATIENT)
Dept: INTERNAL MEDICINE | Facility: CLINIC | Age: 82
End: 2017-11-28

## 2017-11-28 NOTE — TELEPHONE ENCOUNTER
Please call Denny and tell him that no labs will be ordered at her upcoming visit this week.  It takes several months of being on vitamin D before levels increase.  I will not be doing a lipid panel as I do not start cholesterol medication at her age.

## 2017-11-28 NOTE — TELEPHONE ENCOUNTER
Patient is set to come in on Thursday, her son called to ask if she needed to be fasting. Informed that there are not current labs ordered, so I was unsure. States that they will recheck Vitamin D, informed she does not need to fast for that. Is there the change that she needs a lipid panel and that she should fast? Please call Denny.

## 2017-11-30 ENCOUNTER — OFFICE VISIT (OUTPATIENT)
Dept: INTERNAL MEDICINE | Facility: CLINIC | Age: 82
End: 2017-11-30

## 2017-11-30 VITALS
BODY MASS INDEX: 19.84 KG/M2 | WEIGHT: 112 LBS | TEMPERATURE: 97.7 F | SYSTOLIC BLOOD PRESSURE: 128 MMHG | HEART RATE: 75 BPM | DIASTOLIC BLOOD PRESSURE: 70 MMHG | OXYGEN SATURATION: 96 % | HEIGHT: 63 IN

## 2017-11-30 DIAGNOSIS — R63.4 WEIGHT LOSS: ICD-10-CM

## 2017-11-30 DIAGNOSIS — F32.0 MILD SINGLE CURRENT EPISODE OF MAJOR DEPRESSIVE DISORDER (HCC): Primary | ICD-10-CM

## 2017-11-30 PROBLEM — R55 SYNCOPE: Status: RESOLVED | Noted: 2017-02-15 | Resolved: 2017-11-30

## 2017-11-30 PROCEDURE — 99213 OFFICE O/P EST LOW 20 MIN: CPT | Performed by: INTERNAL MEDICINE

## 2017-11-30 NOTE — PROGRESS NOTES
"Chief Complaint   Patient presents with   • Follow-up     4 weeks for weight loss. Son states Pt is no longer taking Remeron.      Subjective   Adelia Maurice is a 89 y.o. female.     HPI Comments: Here for follow-up of weight loss.  Patient could not afford Megace, and did not tolerate Remeron.  She was off balance in the AM after 7.5 mg of Remeron evening before.  She is not hungry.  She will eat when she goes out of if someone else makes her food.   She denies any NV.  No abdominal pain.  She complains that her umbilicus is tender.  No diarrhea or constipation.  She does have occasional fecal incontinence.   She does not like ensure or boost.   No CP or SOB.        The following portions of the patient's history were reviewed and updated as appropriate: allergies, current medications, past family history, past medical history, past social history, past surgical history and problem list.    Review of Systems   Constitutional: Negative for activity change and appetite change.        Slow gradual weight loss   HENT: Negative.    Respiratory: Negative for shortness of breath.    Cardiovascular: Negative for chest pain, palpitations and leg swelling.   Gastrointestinal: Negative.    Genitourinary: Negative.    Skin: Positive for rash.   Psychiatric/Behavioral: Negative for dysphoric mood and sleep disturbance.   All other systems reviewed and are negative.      Objective   /70  Pulse 75  Temp 97.7 °F (36.5 °C)  Ht 63\" (160 cm)  Wt 112 lb (50.8 kg)  SpO2 96%  BMI 19.84 kg/m2  Body mass index is 19.84 kg/(m^2).  Physical Exam   Constitutional: She is oriented to person, place, and time. She appears well-developed and well-nourished.   HENT:   Head: Normocephalic and atraumatic.   Mouth/Throat: Oropharynx is clear and moist.   Eyes: Conjunctivae and EOM are normal. Pupils are equal, round, and reactive to light.   Neck: Normal range of motion. Neck supple. No thyromegaly present.   Cardiovascular: Normal " rate, regular rhythm and intact distal pulses.    Murmur heard.  Systolic murmur grade 1/6   Pulmonary/Chest: Effort normal and breath sounds normal. No respiratory distress.   Abdominal: Soft. Bowel sounds are normal.   Musculoskeletal: She exhibits no edema.   Lymphadenopathy:     She has no cervical adenopathy.   Neurological: She is alert and oriented to person, place, and time. No cranial nerve deficit.   Skin:   Mild erythema within umbilicus but not extending beyond borders, no discharge or warmth   Psychiatric: Her behavior is normal. Thought content normal.   Flat affect, but mood does not appear depressed.  Patient continually tells her son that she is a burden to him   Nursing note and vitals reviewed.      Assessment/Plan   Adelia Maurice is here today and the following problems have been addressed:      Adelia was seen today for follow-up.    Diagnoses and all orders for this visit:    Mild single current episode of major depressive disorder    Weight loss    Will try to cut remeron in half to 3.75 mg qhs  Recommend she try the lotrimin on rash around umbilicus twice a day, if not helpful change to topical antibiotic ointment over-the-counter  Increase meals to TID with snacks in between  No change celexa as depression is stable  Continue vit D    RTC 2mo      Please note that portions of this note were completed with a voice recognition program.  Efforts were made to edit dictation, but occasionally words are mistranscribed.

## 2018-01-18 ENCOUNTER — TELEPHONE (OUTPATIENT)
Dept: INTERNAL MEDICINE | Facility: CLINIC | Age: 83
End: 2018-01-18

## 2018-01-18 RX ORDER — LISINOPRIL 10 MG/1
10 TABLET ORAL DAILY
Qty: 90 TABLET | Refills: 1 | Status: SHIPPED | OUTPATIENT
Start: 2018-01-18 | End: 2018-07-18 | Stop reason: SDUPTHER

## 2018-02-05 RX ORDER — MIRTAZAPINE 7.5 MG/1
3.75 TABLET, FILM COATED ORAL NIGHTLY
Qty: 15 TABLET | Refills: 5 | Status: SHIPPED | OUTPATIENT
Start: 2018-02-05 | End: 2018-04-19 | Stop reason: SDUPTHER

## 2018-02-05 RX ORDER — MIRTAZAPINE 7.5 MG/1
TABLET, FILM COATED ORAL
Qty: 30 TABLET | Refills: 0 | OUTPATIENT
Start: 2018-02-05

## 2018-02-06 ENCOUNTER — OFFICE VISIT (OUTPATIENT)
Dept: INTERNAL MEDICINE | Facility: CLINIC | Age: 83
End: 2018-02-06

## 2018-02-06 VITALS
TEMPERATURE: 97.4 F | HEIGHT: 63 IN | HEART RATE: 73 BPM | SYSTOLIC BLOOD PRESSURE: 128 MMHG | BODY MASS INDEX: 20.7 KG/M2 | DIASTOLIC BLOOD PRESSURE: 50 MMHG | OXYGEN SATURATION: 97 % | WEIGHT: 116.8 LBS

## 2018-02-06 DIAGNOSIS — R41.3 MEMORY LOSS: Primary | ICD-10-CM

## 2018-02-06 DIAGNOSIS — F32.0 MILD SINGLE CURRENT EPISODE OF MAJOR DEPRESSIVE DISORDER (HCC): ICD-10-CM

## 2018-02-06 PROCEDURE — 99213 OFFICE O/P EST LOW 20 MIN: CPT | Performed by: INTERNAL MEDICINE

## 2018-02-06 RX ORDER — ERGOCALCIFEROL 1.25 MG/1
50000 CAPSULE ORAL WEEKLY
Qty: 30 CAPSULE | Refills: 0 | Status: SHIPPED | OUTPATIENT
Start: 2018-02-06 | End: 2018-10-31 | Stop reason: HOSPADM

## 2018-02-06 RX ORDER — ERGOCALCIFEROL 1.25 MG/1
50000 CAPSULE ORAL WEEKLY
COMMUNITY
End: 2018-02-06 | Stop reason: SDUPTHER

## 2018-02-06 RX ORDER — LANOLIN ALCOHOL/MO/W.PET/CERES
3 CREAM (GRAM) TOPICAL NIGHTLY
COMMUNITY
End: 2019-12-06 | Stop reason: SDUPTHER

## 2018-02-06 RX ORDER — ERGOCALCIFEROL 1.25 MG/1
CAPSULE ORAL
Qty: 7 CAPSULE | Refills: 0 | OUTPATIENT
Start: 2018-02-06

## 2018-02-06 NOTE — PROGRESS NOTES
"Chief Complaint   Patient presents with   • Depression     2mo f/u   • Hypertension     Subjective   Adelia Maurice is a 89 y.o. female.     HPI Comments: Here today for follow up of depression.   At last visit her labs revealed a very low vit D.  Her family has been giving her the once monthly vit D 50,000 units.   Her son states her mood is more passive, she is arguing less with them.  Her family has to encourage her to eat.   Since last visit her weight is up 4 pounds.  She states she is sleeping well at night.  She is sleeping more during the day also.  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  Son states that she is not very steady on her feet.  There is usually someone with her at home or in her store during the day.  Family brings her meals throughout the day as she is not stable to make her own meals.  She naps frequently throughout the day and does not sleep well at night.  She feels that she is sleeping adequately however her son is not certain about that.  She is worried about her memory and feels that she is a burden to her family.  Son reassures her that she is not.       The following portions of the patient's history were reviewed and updated as appropriate: allergies, current medications, past family history, past medical history, past social history, past surgical history and problem list.    Review of Systems   Constitutional: Positive for fatigue.   Neurological: Positive for dizziness and weakness. Negative for headaches.   Psychiatric/Behavioral: Positive for confusion and sleep disturbance. Negative for agitation, behavioral problems and suicidal ideas. The patient is not nervous/anxious.         Depression is improving  Frequent naps throughout the day       Objective   /50  Pulse 73  Temp 97.4 °F (36.3 °C)  Ht 160 cm (63\")  Wt 53 kg (116 lb 12.8 oz)  SpO2 97%  BMI 20.69 kg/m2  Body mass index is 20.69 kg/(m^2).  Physical Exam "   Constitutional: She appears well-developed and well-nourished.   Very pleasant female, ambulates with use of a cane   HENT:   Head: Normocephalic and atraumatic.   Mouth/Throat: Oropharynx is clear and moist.   Eyes: Conjunctivae and EOM are normal. Pupils are equal, round, and reactive to light.   Neck: Normal range of motion. Neck supple. No thyromegaly present.   Cardiovascular:   No murmur heard.  Pulmonary/Chest: Effort normal. No respiratory distress.   Musculoskeletal: She exhibits no edema.   Lymphadenopathy:     She has no cervical adenopathy.   Neurological: She is alert. No cranial nerve deficit.   Psychiatric: She has a normal mood and affect.   Nursing note and vitals reviewed.      Assessment/Plan   Adelia Maurice is here today and the following problems have been addressed:      Adelia was seen today for depression and hypertension.    Diagnoses and all orders for this visit:    Memory loss    Mild single current episode of major depressive disorder    Other orders  -     vitamin D (ERGOCALCIFEROL) 59348 units capsule capsule; Take 1 capsule by mouth 1 (One) Time Per Week.    Continue vit D 50k once a week  No medication changes  Encouraged family to work with pt using games/puzzles at home  Take her to [employer] [place] for more interaction  Labs next visit    RTC 2 mo for routine follow up  Please note that portions of this note were completed with a voice recognition program.  Efforts were made to edit dictation, but occasionally words are mistranscribed.

## 2018-04-04 ENCOUNTER — OFFICE VISIT (OUTPATIENT)
Dept: INTERNAL MEDICINE | Facility: CLINIC | Age: 83
End: 2018-04-04

## 2018-04-04 VITALS
BODY MASS INDEX: 21.79 KG/M2 | HEART RATE: 77 BPM | SYSTOLIC BLOOD PRESSURE: 158 MMHG | WEIGHT: 123 LBS | DIASTOLIC BLOOD PRESSURE: 72 MMHG | TEMPERATURE: 97.6 F | HEIGHT: 63 IN | OXYGEN SATURATION: 95 %

## 2018-04-04 DIAGNOSIS — I10 ESSENTIAL HYPERTENSION: Primary | ICD-10-CM

## 2018-04-04 DIAGNOSIS — R41.3 MEMORY LOSS: ICD-10-CM

## 2018-04-04 DIAGNOSIS — E55.9 VITAMIN D DEFICIENCY: ICD-10-CM

## 2018-04-04 DIAGNOSIS — F32.0 MILD SINGLE CURRENT EPISODE OF MAJOR DEPRESSIVE DISORDER (HCC): ICD-10-CM

## 2018-04-04 DIAGNOSIS — R53.82 CHRONIC FATIGUE: ICD-10-CM

## 2018-04-04 PROBLEM — R63.4 WEIGHT LOSS: Status: RESOLVED | Noted: 2017-11-30 | Resolved: 2018-04-04

## 2018-04-04 LAB
25(OH)D3+25(OH)D2 SERPL-MCNC: 29.7 NG/ML
BUN SERPL-MCNC: 27 MG/DL (ref 7–20)
BUN/CREAT SERPL: 38.6 (ref 7.1–23.5)
CALCIUM SERPL-MCNC: 9.5 MG/DL (ref 8.4–10.2)
CHLORIDE SERPL-SCNC: 104 MMOL/L (ref 98–107)
CO2 SERPL-SCNC: 31 MMOL/L (ref 26–30)
CREAT SERPL-MCNC: 0.7 MG/DL (ref 0.6–1.3)
GFR SERPLBLD CREATININE-BSD FMLA CKD-EPI: 78 ML/MIN/1.73
GFR SERPLBLD CREATININE-BSD FMLA CKD-EPI: 94 ML/MIN/1.73
GLUCOSE SERPL-MCNC: 95 MG/DL (ref 74–98)
POTASSIUM SERPL-SCNC: 4.8 MMOL/L (ref 3.5–5.1)
SODIUM SERPL-SCNC: 148 MMOL/L (ref 137–145)
VIT B12 SERPL-MCNC: 266 PG/ML (ref 239–931)

## 2018-04-04 PROCEDURE — 99213 OFFICE O/P EST LOW 20 MIN: CPT | Performed by: INTERNAL MEDICINE

## 2018-04-04 NOTE — PROGRESS NOTES
"Chief Complaint   Patient presents with   • Follow-up     2 months for depression and HTN. No new complaints.     Subjective   Adelia Maurice is a 89 y.o. female.     Here for follow up of HTN, depression, vit D def and dementia.  Her BP is elevated today, usually is normal.  She just took meds before she left to come here.  She is fasting for labs.  Home reads runs about 130/60s.  She denies CP, SOB, edema or palpitations.   She is not sleeping well overall.  Son feels her depression is better overall.  She went on trip with family recently.  When kept awake all day, she does sleep at night.  While at home though, she tends to nap during day so does not sleep well at night.    She has gained 7 lbs, says the food is poked down her.  She feels she does not have a good appetite.    She is still on weekly vit D.  She refuses any games/puzzles.  She will not go to [employer].    She has no concerns today.             The following portions of the patient's history were reviewed and updated as appropriate: allergies, current medications, past family history, past medical history, past social history, past surgical history and problem list.    Review of Systems   Respiratory: Negative for shortness of breath.    Cardiovascular: Negative for chest pain, palpitations and leg swelling.   Gastrointestinal: Negative.    Genitourinary: Negative.    Musculoskeletal: Positive for gait problem.   Psychiatric/Behavioral: Positive for confusion and sleep disturbance. Negative for dysphoric mood. The patient is not nervous/anxious.    All other systems reviewed and are negative.      Objective   /72   Pulse 77   Temp 97.6 °F (36.4 °C)   Ht 160 cm (63\")   Wt 55.8 kg (123 lb)   SpO2 95%   BMI 21.79 kg/m²   Body mass index is 21.79 kg/m².  Physical Exam   Constitutional: She appears well-developed and well-nourished.   HENT:   Head: Normocephalic and atraumatic.   Mouth/Throat: Oropharynx is clear and moist.   Eyes: " Conjunctivae and EOM are normal. Pupils are equal, round, and reactive to light.   Neck: Normal range of motion. Neck supple. No thyromegaly present.   Cardiovascular: Normal rate, regular rhythm and intact distal pulses.    Murmur heard.  Occasional ectopic beat, SM 1/6 at LSB   Pulmonary/Chest: Effort normal and breath sounds normal. No respiratory distress.   Musculoskeletal: She exhibits no edema.   Ambulates with use of cane   Lymphadenopathy:     She has no cervical adenopathy.   Neurological: She is alert. No cranial nerve deficit.   Psychiatric: She has a normal mood and affect.   Son answers several questions for pt due to confusion at times   Nursing note and vitals reviewed.      Assessment/Plan   Adelia Maurice is here today and the following problems have been addressed:      Adelia was seen today for follow-up.    Diagnoses and all orders for this visit:    Essential hypertension  -     Basic Metabolic Panel    Vitamin D deficiency  -     Vitamin D 25 Hydroxy    Mild single current episode of major depressive disorder    Memory loss    Chronic fatigue  -     Vitamin B12    Follow heart healthy/low salt diet  Avoid processed foods  Monitor blood pressure as discussed  Exercise as tolerated up to 30 minutes 5 days per week  Take all medications as prescribed  Take vit D 2000 u daily  Labs as noted  Bring BP reads to next OV    RTC 4 mo    Please note that portions of this note were completed with a voice recognition program.  Efforts were made to edit dictation, but occasionally words are mistranscribed.

## 2018-04-04 NOTE — PROGRESS NOTES
Please call son with lab results.  Patient appears slightly dehydrated which may be due to fasting for labs.  In addition her B12 level is low for which I recommend B12 1000 µg on Monday Wednesday Friday.  Vitamin D level is normal.

## 2018-04-13 RX ORDER — CITALOPRAM 20 MG/1
TABLET ORAL
Qty: 30 TABLET | Refills: 12 | Status: SHIPPED | OUTPATIENT
Start: 2018-04-13 | End: 2018-04-19 | Stop reason: SDUPTHER

## 2018-04-19 RX ORDER — CITALOPRAM 20 MG/1
20 TABLET ORAL DAILY
Qty: 30 TABLET | Refills: 12 | Status: SHIPPED | OUTPATIENT
Start: 2018-04-19 | End: 2019-04-25 | Stop reason: SDUPTHER

## 2018-04-19 RX ORDER — MIRTAZAPINE 7.5 MG/1
7.5 TABLET, FILM COATED ORAL NIGHTLY
Qty: 30 TABLET | Refills: 5 | Status: SHIPPED | OUTPATIENT
Start: 2018-04-19 | End: 2018-08-06

## 2018-04-19 NOTE — TELEPHONE ENCOUNTER
PATIENTS SON STATES THAT DR VERMEESCH INCREASED THE DOSE OF HIS MOTHERS MIRTAZAPINE FROM A HALF TO A WHOLE PILL. THE PATIENT IS NOW OUT AND NEEDS THE NEW DOSAGE CALLED IN FOR A REFILL.    PATIENT IS ALSO OUT OF CITALOPRAM, BUT THIS DOSAGE IS CORRECT.    [facility] PHARMACY VERIFIED

## 2018-05-16 RX ORDER — DONEPEZIL HYDROCHLORIDE 10 MG/1
TABLET, FILM COATED ORAL
Qty: 60 TABLET | Refills: 4 | Status: SHIPPED | OUTPATIENT
Start: 2018-05-16 | End: 2018-10-11 | Stop reason: SDUPTHER

## 2018-07-02 ENCOUNTER — OFFICE VISIT (OUTPATIENT)
Dept: CARDIOLOGY | Facility: CLINIC | Age: 83
End: 2018-07-02

## 2018-07-02 VITALS
SYSTOLIC BLOOD PRESSURE: 138 MMHG | DIASTOLIC BLOOD PRESSURE: 60 MMHG | HEIGHT: 63 IN | BODY MASS INDEX: 22.15 KG/M2 | WEIGHT: 125 LBS | HEART RATE: 68 BPM

## 2018-07-02 DIAGNOSIS — R60.0 LOWER EXTREMITY EDEMA: ICD-10-CM

## 2018-07-02 DIAGNOSIS — I10 ESSENTIAL HYPERTENSION: Primary | ICD-10-CM

## 2018-07-02 PROCEDURE — 99213 OFFICE O/P EST LOW 20 MIN: CPT | Performed by: INTERNAL MEDICINE

## 2018-07-02 RX ORDER — ASPIRIN 325 MG
162.5 TABLET ORAL DAILY
COMMUNITY
End: 2018-10-31 | Stop reason: HOSPADM

## 2018-07-02 RX ORDER — THIAMINE HCL 50 MG
50 TABLET ORAL EVERY OTHER DAY
Status: ON HOLD | COMMUNITY
End: 2018-10-29

## 2018-07-02 NOTE — PROGRESS NOTES
Adelia POPE [place]  4/29/1922  [phone]  [phone]    07/02/18     PCP: Marilyn K. Vermeesch, MD  [address] [place], [place] [id number]    IDENTIFICATION: A 89 y.o. female retired [employer] , , and [facility] [place].    Chief Complaint   Patient presents with   • Shortness of Breath       Patient Active Problem List    Diagnosis   • Vitamin D deficiency [E55.9]   • Environmental allergies [Z91.09]   • BMI 29.0-29.9,adult [Z68.29]   • Macular degeneration [H35.30]   • Arthritis of hip [M16.10]   • Carotid bruit [R09.89]   • Fatigue [R53.83]   • Depression [F32.9]   • Essential hypertension [I10]   • Memory loss [R41.3]   • Neuralgia [M79.2]     PROBLEM LIST:  1. Dyspnea:  a. June 2011: D-dimer 1700 with negative CTA for pulmonary embolus.   2. Syncope and collapse:  a. Holter, June 2011 with occasional PAC/PVC, no tachy or bradyarrhythmias to account of syncopal episode.  B. Carotid duplex 12/15: Bilateral mild-mod. Plaque without hemodynamically significant stenosis.  C. Syncope and fall 2/11/17, eval at [facility] and [facility] home.  3. Hypertension, presumed essential.  4. Unknown lipid status.  5. Remote valve surgery, 1996 and 2007.    Allergies  Allergies   Allergen Reactions   • Other        Current Medications    Current Outpatient Prescriptions:   •  amLODIPine (NORVASC) 2.5 MG tablet, Take 1 tablet by mouth Daily., Disp: 30 tablet, Rfl: 12  •  citalopram (CeleXA) 20 MG tablet, Take 1 tablet by mouth Daily., Disp: 30 tablet, Rfl: 12  •  donepezil (ARICEPT) 10 MG tablet, TAKE TWO TABLETS BY MOUTH EVERY NIGHT, Disp: 60 tablet, Rfl: 4  •  ipratropium (ATROVENT) 0.03 % nasal spray, 1 spray into each nostril Every 12 (Twelve) Hours., Disp: 1 each, Rfl: 11  •  latanoprost (XALATAN) 0.005 % ophthalmic solution, Administer 1 drop to both eyes every night., Disp: , Rfl:   •  lisinopril (PRINIVIL,ZESTRIL) 10 MG tablet, Take 1 tablet by mouth Daily., Disp: 90 tablet, Rfl:  "1  •  melatonin 3 MG tablet, Take 3 mg by mouth Every Night., Disp: , Rfl:   •  mirtazapine (REMERON) 7.5 MG tablet, Take 1 tablet by mouth Every Night., Disp: 30 tablet, Rfl: 5  •  Multiple Vitamins-Minerals (PRESERVISION AREDS PO), Take  by mouth 2 (Two) Times a Day., Disp: , Rfl:   •  Turmeric Curcumin 500 MG capsule, Take  by mouth 2 (Two) Times a Day., Disp: , Rfl:   •  vitamin D (ERGOCALCIFEROL) 76903 units capsule capsule, Take 1 capsule by mouth 1 (One) Time Per Week., Disp: 30 capsule, Rfl: 0    History of Present Illness   Palpitations    Associated symptoms include shortness of breath.   Shortness of Breath       Patient presents in routine follow up.  No further falls but remains with some gait instability.  She continues to work 3 days a week.  Her weight has maintained over the last several months and her blood pressure has been well controlled      ROS:  All systems have been reviewed and are negative with the exception of those mentioned in the HPI.    OBJECTIVE:  Vitals:    07/02/18 1007   Weight: 56.7 kg (125 lb)   Height: 160 cm (63\")     Physical Exam   Constitutional: She is oriented to person, place, and time. She appears well-developed and well-nourished.   Neck: Normal range of motion. Neck supple. No hepatojugular reflux and no JVD present. Carotid bruit is not present. No tracheal deviation present. No thyromegaly present.   Cardiovascular: Normal rate, regular rhythm, S1 normal, S2 normal, intact distal pulses and normal pulses.   Occasional extrasystoles are present. PMI is not displaced.  Exam reveals no gallop, no distant heart sounds, no friction rub, no midsystolic click and no opening snap.    No murmur heard.  Pulses:       Radial pulses are 2+ on the right side, and 2+ on the left side.        Dorsalis pedis pulses are 2+ on the right side, and 2+ on the left side.        Posterior tibial pulses are 2+ on the right side, and 2+ on the left side.   Pulmonary/Chest: Effort normal and " breath sounds normal. She has no wheezes. She has no rales.   Abdominal: Soft. Bowel sounds are normal. She exhibits no mass. There is no tenderness. There is no guarding.   Musculoskeletal: Normal range of motion. She exhibits no edema or tenderness.   Varicosities bilateral lower extremities.   Neurological: She is alert and oriented to person, place, and time.   Psychiatric: She has a normal mood and affect.   Nursing note and vitals reviewed.      Diagnostic Data:  Procedures  Reviewed lab data and ECG from her ED visit on 2/11/17.    ASSESSMENT:   Diagnosis Plan   1. Essential hypertension     2. Lower extremity edema         PLAN:  1.Hypertension with permissive hypertension a loud systolic status at current.  She states at home typically 150 or lower.    2.  Lower extremity edema recommended  compression stockings

## 2018-07-19 RX ORDER — LISINOPRIL 10 MG/1
TABLET ORAL
Qty: 90 TABLET | Refills: 0 | Status: SHIPPED | OUTPATIENT
Start: 2018-07-19 | End: 2018-09-12 | Stop reason: SDUPTHER

## 2018-08-06 ENCOUNTER — OFFICE VISIT (OUTPATIENT)
Dept: INTERNAL MEDICINE | Facility: CLINIC | Age: 83
End: 2018-08-06

## 2018-08-06 VITALS
TEMPERATURE: 97.6 F | DIASTOLIC BLOOD PRESSURE: 60 MMHG | HEART RATE: 70 BPM | OXYGEN SATURATION: 95 % | BODY MASS INDEX: 22.68 KG/M2 | WEIGHT: 128 LBS | SYSTOLIC BLOOD PRESSURE: 124 MMHG | HEIGHT: 63 IN

## 2018-08-06 DIAGNOSIS — F32.0 MILD SINGLE CURRENT EPISODE OF MAJOR DEPRESSIVE DISORDER (HCC): ICD-10-CM

## 2018-08-06 DIAGNOSIS — E55.9 VITAMIN D DEFICIENCY: ICD-10-CM

## 2018-08-06 DIAGNOSIS — E53.8 VITAMIN B 12 DEFICIENCY: ICD-10-CM

## 2018-08-06 DIAGNOSIS — I10 ESSENTIAL HYPERTENSION: Primary | ICD-10-CM

## 2018-08-06 PROCEDURE — 99213 OFFICE O/P EST LOW 20 MIN: CPT | Performed by: INTERNAL MEDICINE

## 2018-08-06 NOTE — PROGRESS NOTES
"Chief Complaint   Patient presents with   • Follow-up     4 months for depression and HTN. Son states Pt wants to sleep all the time.      Subjective   Adelia Maurice is a 89 y.o. female.     Here for follow up of HTN, vit D and B12 def, depression, DJD.   HTN- her BP is well controlled.  No CP.  She is SOA with exertion.  No edema.   Depression- she is on both celexa and remeron.  She is more sleepy since on full tab of remeron.  She is eating better, but family is persistent with her and wt is increasing.   She feels like she just wants to sleep overall.  Her dementia appears to be stable and not progressing at a rapid rate, she remains on Aricept 10 mg daily.  She is taking her vit D and B12 as directed.   DJD- she denies any DJD pain right now.  She takes IBU prn for pain in her back.  Turmeric appears to be working well and keeping her arthritis symptoms well controlled.           The following portions of the patient's history were reviewed and updated as appropriate: allergies, current medications, past family history, past medical history, past social history, past surgical history and problem list.    Review of Systems   Constitutional: Positive for fatigue.   Respiratory: Negative for shortness of breath.    Cardiovascular: Negative for chest pain, palpitations and leg swelling.   Gastrointestinal: Negative.    Genitourinary:        Urine incontinence   Psychiatric/Behavioral: Positive for confusion and sleep disturbance. Negative for dysphoric mood. The patient is not nervous/anxious.        Objective   /60   Pulse 70   Temp 97.6 °F (36.4 °C)   Ht 160 cm (63\")   Wt 58.1 kg (128 lb)   SpO2 95%   BMI 22.67 kg/m²   Body mass index is 22.67 kg/m².  Physical Exam   Constitutional: She appears well-developed and well-nourished.   Patient ambulates with the use of a cane   HENT:   Head: Normocephalic and atraumatic.   Mouth/Throat: Oropharynx is clear and moist.   Eyes: Pupils are equal, round, " and reactive to light. Conjunctivae and EOM are normal.   Neck: Normal range of motion. Neck supple. No thyromegaly present.   Cardiovascular: Normal rate, regular rhythm and intact distal pulses.    Murmur heard.  Systolic murmur grade 1/6 heard over precordium   Pulmonary/Chest: Effort normal and breath sounds normal. No respiratory distress.   Musculoskeletal: She exhibits no edema.   Lymphadenopathy:     She has no cervical adenopathy.   Neurological: She is alert. No cranial nerve deficit.   Psychiatric: Her behavior is normal. Judgment and thought content normal.   Flat affect, patient appears very tired   Nursing note and vitals reviewed.      Assessment/Plan   Adelia Maurice is here today and the following problems have been addressed:      Adelia was seen today for follow-up.    Diagnoses and all orders for this visit:    Essential hypertension    Vitamin D deficiency    Mild single current episode of major depressive disorder (CMS/HCC)    Vitamin B 12 deficiency    Follow heart healthy/low salt diet  Avoid processed foods  Monitor blood pressure as discussed  Take all medications as prescribed  Continue daily vit D and vi tB 12 on MWF  Stop remeron and continue celexa secondary to fatigue  If depression worsens, we will resume Remeron at higher dose such as 15 mg daily as this will likely help depression but not cause as much fatigue    RTC 3 mo or sooner    Please note that portions of this note were completed with a voice recognition program.  Efforts were made to edit dictation, but occasionally words are mistranscribed.

## 2018-09-13 RX ORDER — LISINOPRIL 10 MG/1
TABLET ORAL
Qty: 90 TABLET | Refills: 1 | Status: SHIPPED | OUTPATIENT
Start: 2018-09-13 | End: 2019-03-21 | Stop reason: SDUPTHER

## 2018-10-11 RX ORDER — DONEPEZIL HYDROCHLORIDE 10 MG/1
TABLET, FILM COATED ORAL
Qty: 60 TABLET | Refills: 3 | Status: SHIPPED | OUTPATIENT
Start: 2018-10-11 | End: 2019-02-20 | Stop reason: SDUPTHER

## 2018-10-27 ENCOUNTER — APPOINTMENT (OUTPATIENT)
Dept: CT IMAGING | Facility: HOSPITAL | Age: 83
End: 2018-10-27

## 2018-10-27 ENCOUNTER — HOSPITAL ENCOUNTER (INPATIENT)
Facility: HOSPITAL | Age: 83
LOS: 1 days | Discharge: HOME | End: 2018-10-31
Attending: EMERGENCY MEDICINE | Admitting: HOSPITALIST

## 2018-10-27 DIAGNOSIS — Z74.09 IMPAIRED FUNCTIONAL MOBILITY, BALANCE, GAIT, AND ENDURANCE: ICD-10-CM

## 2018-10-27 DIAGNOSIS — K92.2 GASTROINTESTINAL HEMORRHAGE, UNSPECIFIED GASTROINTESTINAL HEMORRHAGE TYPE: Primary | ICD-10-CM

## 2018-10-27 DIAGNOSIS — Z78.9 IMPAIRED MOBILITY AND ADLS: ICD-10-CM

## 2018-10-27 DIAGNOSIS — Z74.09 IMPAIRED MOBILITY AND ADLS: ICD-10-CM

## 2018-10-27 LAB
ALBUMIN SERPL-MCNC: 3.7 G/DL (ref 3.5–5)
ALBUMIN/GLOB SERPL: 1.4 G/DL (ref 1–2)
ALP SERPL-CCNC: 57 U/L (ref 38–126)
ALT SERPL W P-5'-P-CCNC: 19 U/L (ref 13–69)
AMYLASE SERPL-CCNC: 49 U/L (ref 30–110)
ANION GAP SERPL CALCULATED.3IONS-SCNC: 7.9 MMOL/L (ref 10–20)
AST SERPL-CCNC: 21 U/L (ref 15–46)
BACTERIA UR QL AUTO: ABNORMAL /HPF
BASOPHILS # BLD AUTO: 0.03 10*3/MM3 (ref 0–0.2)
BASOPHILS NFR BLD AUTO: 0.4 % (ref 0–2.5)
BILIRUB SERPL-MCNC: 0.3 MG/DL (ref 0.2–1.3)
BILIRUB UR QL STRIP: NEGATIVE
BUN BLD-MCNC: 23 MG/DL (ref 7–20)
BUN/CREAT SERPL: 28.8 (ref 7.1–23.5)
CALCIUM SPEC-SCNC: 9.1 MG/DL (ref 8.4–10.2)
CHLORIDE SERPL-SCNC: 103 MMOL/L (ref 98–107)
CLARITY UR: ABNORMAL
CO2 SERPL-SCNC: 29 MMOL/L (ref 26–30)
COLOR UR: YELLOW
CREAT BLD-MCNC: 0.8 MG/DL (ref 0.6–1.3)
DEPRECATED RDW RBC AUTO: 50.2 FL (ref 37–54)
EOSINOPHIL # BLD AUTO: 0.1 10*3/MM3 (ref 0–0.7)
EOSINOPHIL NFR BLD AUTO: 1.3 % (ref 0–7)
ERYTHROCYTE [DISTWIDTH] IN BLOOD BY AUTOMATED COUNT: 14.3 % (ref 11.5–14.5)
GFR SERPL CREATININE-BSD FRML MDRD: 67 ML/MIN/1.73
GLOBULIN UR ELPH-MCNC: 2.7 GM/DL
GLUCOSE BLD-MCNC: 162 MG/DL (ref 74–98)
GLUCOSE UR STRIP-MCNC: NEGATIVE MG/DL
HCT VFR BLD AUTO: 35.3 % (ref 37–47)
HEMOCCULT STL QL: POSITIVE
HGB BLD-MCNC: 11.4 G/DL (ref 12–16)
HGB UR QL STRIP.AUTO: ABNORMAL
HYALINE CASTS UR QL AUTO: ABNORMAL /LPF
IMM GRANULOCYTES # BLD: 0.02 10*3/MM3 (ref 0–0.06)
IMM GRANULOCYTES NFR BLD: 0.3 % (ref 0–0.6)
KETONES UR QL STRIP: ABNORMAL
LEUKOCYTE ESTERASE UR QL STRIP.AUTO: NEGATIVE
LIPASE SERPL-CCNC: 117 U/L (ref 23–300)
LYMPHOCYTES # BLD AUTO: 2.15 10*3/MM3 (ref 0.6–3.4)
LYMPHOCYTES NFR BLD AUTO: 27.8 % (ref 10–50)
MAGNESIUM SERPL-MCNC: 1.9 MG/DL (ref 1.6–2.3)
MCH RBC QN AUTO: 30.9 PG (ref 27–31)
MCHC RBC AUTO-ENTMCNC: 32.3 G/DL (ref 30–37)
MCV RBC AUTO: 95.7 FL (ref 81–99)
MONOCYTES # BLD AUTO: 0.57 10*3/MM3 (ref 0–0.9)
MONOCYTES NFR BLD AUTO: 7.4 % (ref 0–12)
MUCOUS THREADS URNS QL MICRO: ABNORMAL /HPF
NEUTROPHILS # BLD AUTO: 4.85 10*3/MM3 (ref 2–6.9)
NEUTROPHILS NFR BLD AUTO: 62.8 % (ref 37–80)
NITRITE UR QL STRIP: NEGATIVE
NRBC BLD MANUAL-RTO: 0 /100 WBC (ref 0–0)
PH UR STRIP.AUTO: 5.5 [PH] (ref 5–8)
PLATELET # BLD AUTO: 210 10*3/MM3 (ref 130–400)
PMV BLD AUTO: 10.4 FL (ref 6–12)
POTASSIUM BLD-SCNC: 3.9 MMOL/L (ref 3.5–5.1)
PROT SERPL-MCNC: 6.4 G/DL (ref 6.3–8.2)
PROT UR QL STRIP: ABNORMAL
RBC # BLD AUTO: 3.69 10*6/MM3 (ref 4.2–5.4)
RBC # UR: ABNORMAL /HPF
REF LAB TEST METHOD: ABNORMAL
SODIUM BLD-SCNC: 136 MMOL/L (ref 137–145)
SP GR UR STRIP: >=1.03 (ref 1–1.03)
SQUAMOUS #/AREA URNS HPF: ABNORMAL /HPF
UROBILINOGEN UR QL STRIP: ABNORMAL
WBC NRBC COR # BLD: 7.72 10*3/MM3 (ref 4.8–10.8)
WBC UR QL AUTO: ABNORMAL /HPF

## 2018-10-27 PROCEDURE — 93005 ELECTROCARDIOGRAM TRACING: CPT | Performed by: FAMILY MEDICINE

## 2018-10-27 PROCEDURE — 82272 OCCULT BLD FECES 1-3 TESTS: CPT | Performed by: NURSE PRACTITIONER

## 2018-10-27 PROCEDURE — 83735 ASSAY OF MAGNESIUM: CPT | Performed by: NURSE PRACTITIONER

## 2018-10-27 PROCEDURE — G0378 HOSPITAL OBSERVATION PER HR: HCPCS

## 2018-10-27 PROCEDURE — 82150 ASSAY OF AMYLASE: CPT | Performed by: NURSE PRACTITIONER

## 2018-10-27 PROCEDURE — 74177 CT ABD & PELVIS W/CONTRAST: CPT

## 2018-10-27 PROCEDURE — 99220 PR INITIAL OBSERVATION CARE/DAY 70 MINUTES: CPT | Performed by: FAMILY MEDICINE

## 2018-10-27 PROCEDURE — 83690 ASSAY OF LIPASE: CPT | Performed by: NURSE PRACTITIONER

## 2018-10-27 PROCEDURE — 25010000002 IOPAMIDOL 61 % SOLUTION: Performed by: EMERGENCY MEDICINE

## 2018-10-27 PROCEDURE — 81001 URINALYSIS AUTO W/SCOPE: CPT | Performed by: NURSE PRACTITIONER

## 2018-10-27 PROCEDURE — 80053 COMPREHEN METABOLIC PANEL: CPT | Performed by: NURSE PRACTITIONER

## 2018-10-27 PROCEDURE — 99284 EMERGENCY DEPT VISIT MOD MDM: CPT

## 2018-10-27 PROCEDURE — 85025 COMPLETE CBC W/AUTO DIFF WBC: CPT | Performed by: NURSE PRACTITIONER

## 2018-10-27 RX ORDER — CITALOPRAM 20 MG/1
20 TABLET ORAL DAILY
Status: DISCONTINUED | OUTPATIENT
Start: 2018-10-28 | End: 2018-10-31 | Stop reason: HOSPADM

## 2018-10-27 RX ORDER — AMLODIPINE BESYLATE 5 MG/1
2.5 TABLET ORAL DAILY
Status: DISCONTINUED | OUTPATIENT
Start: 2018-10-28 | End: 2018-10-28

## 2018-10-27 RX ORDER — ONDANSETRON 2 MG/ML
4 INJECTION INTRAMUSCULAR; INTRAVENOUS EVERY 6 HOURS PRN
Status: DISCONTINUED | OUTPATIENT
Start: 2018-10-27 | End: 2018-10-31 | Stop reason: HOSPADM

## 2018-10-27 RX ORDER — ACETAMINOPHEN 325 MG/1
650 TABLET ORAL EVERY 4 HOURS PRN
Status: DISCONTINUED | OUTPATIENT
Start: 2018-10-27 | End: 2018-10-31 | Stop reason: HOSPADM

## 2018-10-27 RX ORDER — LANOLIN ALCOHOL/MO/W.PET/CERES
3 CREAM (GRAM) TOPICAL NIGHTLY
Status: DISCONTINUED | OUTPATIENT
Start: 2018-10-27 | End: 2018-10-31 | Stop reason: HOSPADM

## 2018-10-27 RX ORDER — BISACODYL 10 MG
10 SUPPOSITORY, RECTAL RECTAL DAILY PRN
Status: DISCONTINUED | OUTPATIENT
Start: 2018-10-27 | End: 2018-10-31 | Stop reason: HOSPADM

## 2018-10-27 RX ORDER — ACETAMINOPHEN 325 MG/1
975 TABLET ORAL ONCE
Status: COMPLETED | OUTPATIENT
Start: 2018-10-27 | End: 2018-10-27

## 2018-10-27 RX ORDER — PANTOPRAZOLE SODIUM 40 MG/10ML
40 INJECTION, POWDER, LYOPHILIZED, FOR SOLUTION INTRAVENOUS EVERY 12 HOURS SCHEDULED
Status: DISCONTINUED | OUTPATIENT
Start: 2018-10-27 | End: 2018-10-31 | Stop reason: HOSPADM

## 2018-10-27 RX ORDER — UBIDECARENONE 75 MG
10 CAPSULE ORAL 3 TIMES WEEKLY
COMMUNITY

## 2018-10-27 RX ORDER — MELATONIN
2000 DAILY
COMMUNITY

## 2018-10-27 RX ORDER — LISINOPRIL 10 MG/1
10 TABLET ORAL DAILY
Status: DISCONTINUED | OUTPATIENT
Start: 2018-10-28 | End: 2018-10-28

## 2018-10-27 RX ORDER — SODIUM CHLORIDE 0.9 % (FLUSH) 0.9 %
3 SYRINGE (ML) INJECTION EVERY 12 HOURS SCHEDULED
Status: DISCONTINUED | OUTPATIENT
Start: 2018-10-27 | End: 2018-10-31 | Stop reason: HOSPADM

## 2018-10-27 RX ORDER — SODIUM CHLORIDE 0.9 % (FLUSH) 0.9 %
3-10 SYRINGE (ML) INJECTION AS NEEDED
Status: DISCONTINUED | OUTPATIENT
Start: 2018-10-27 | End: 2018-10-31 | Stop reason: HOSPADM

## 2018-10-27 RX ORDER — MULTIPLE VITAMINS W/ MINERALS TAB 9MG-400MCG
1 TAB ORAL DAILY
Status: DISCONTINUED | OUTPATIENT
Start: 2018-10-28 | End: 2018-10-30 | Stop reason: SDUPTHER

## 2018-10-27 RX ORDER — DONEPEZIL HYDROCHLORIDE 5 MG/1
20 TABLET, FILM COATED ORAL NIGHTLY
Status: DISCONTINUED | OUTPATIENT
Start: 2018-10-27 | End: 2018-10-31 | Stop reason: HOSPADM

## 2018-10-27 RX ORDER — SODIUM CHLORIDE 9 MG/ML
50 INJECTION, SOLUTION INTRAVENOUS CONTINUOUS
Status: DISCONTINUED | OUTPATIENT
Start: 2018-10-27 | End: 2018-10-28

## 2018-10-27 RX ADMIN — IOPAMIDOL 100 ML: 612 INJECTION, SOLUTION INTRAVENOUS at 20:29

## 2018-10-27 RX ADMIN — SODIUM CHLORIDE 50 ML/HR: 9 INJECTION, SOLUTION INTRAVENOUS at 23:11

## 2018-10-27 RX ADMIN — ACETAMINOPHEN 975 MG: 325 TABLET, FILM COATED ORAL at 20:40

## 2018-10-27 RX ADMIN — SODIUM CHLORIDE 50 ML/HR: 9 INJECTION, SOLUTION INTRAVENOUS at 23:08

## 2018-10-27 RX ADMIN — SODIUM CHLORIDE 500 ML: 9 INJECTION, SOLUTION INTRAVENOUS at 19:54

## 2018-10-28 ENCOUNTER — TELEPHONE (OUTPATIENT)
Dept: INTERNAL MEDICINE | Facility: CLINIC | Age: 83
End: 2018-10-28

## 2018-10-28 PROBLEM — D62 ACUTE BLOOD LOSS ANEMIA: Status: ACTIVE | Noted: 2018-10-28

## 2018-10-28 LAB
ABO GROUP BLD: NORMAL
ABO GROUP BLD: NORMAL
ANION GAP SERPL CALCULATED.3IONS-SCNC: 6.9 MMOL/L (ref 10–20)
BLD GP AB SCN SERPL QL: NEGATIVE
BUN BLD-MCNC: 18 MG/DL (ref 7–20)
BUN/CREAT SERPL: 30 (ref 7.1–23.5)
CALCIUM SPEC-SCNC: 8.6 MG/DL (ref 8.4–10.2)
CHLORIDE SERPL-SCNC: 105 MMOL/L (ref 98–107)
CO2 SERPL-SCNC: 29 MMOL/L (ref 26–30)
CREAT BLD-MCNC: 0.6 MG/DL (ref 0.6–1.3)
DEPRECATED RDW RBC AUTO: 49.5 FL (ref 37–54)
ERYTHROCYTE [DISTWIDTH] IN BLOOD BY AUTOMATED COUNT: 14.3 % (ref 11.5–14.5)
GFR SERPL CREATININE-BSD FRML MDRD: 93 ML/MIN/1.73
GLUCOSE BLD-MCNC: 91 MG/DL (ref 74–98)
HCT VFR BLD AUTO: 27.1 % (ref 37–47)
HCT VFR BLD AUTO: 31 % (ref 37–47)
HCT VFR BLD AUTO: 31.8 % (ref 37–47)
HCT VFR BLD AUTO: 33 % (ref 37–47)
HGB BLD-MCNC: 10.2 G/DL (ref 12–16)
HGB BLD-MCNC: 10.2 G/DL (ref 12–16)
HGB BLD-MCNC: 10.7 G/DL (ref 12–16)
HGB BLD-MCNC: 8.7 G/DL (ref 12–16)
MCH RBC QN AUTO: 30.6 PG (ref 27–31)
MCHC RBC AUTO-ENTMCNC: 32.4 G/DL (ref 30–37)
MCV RBC AUTO: 94.3 FL (ref 81–99)
PLATELET # BLD AUTO: ABNORMAL 10*3/MM3 (ref 130–400)
PMV BLD AUTO: 11.7 FL (ref 6–12)
POTASSIUM BLD-SCNC: 3.9 MMOL/L (ref 3.5–5.1)
RBC # BLD AUTO: 3.5 10*6/MM3 (ref 4.2–5.4)
RH BLD: POSITIVE
RH BLD: POSITIVE
SODIUM BLD-SCNC: 137 MMOL/L (ref 137–145)
T&S EXPIRATION DATE: NORMAL
WBC NRBC COR # BLD: 5.73 10*3/MM3 (ref 4.8–10.8)

## 2018-10-28 PROCEDURE — 86850 RBC ANTIBODY SCREEN: CPT | Performed by: INTERNAL MEDICINE

## 2018-10-28 PROCEDURE — 85018 HEMOGLOBIN: CPT | Performed by: FAMILY MEDICINE

## 2018-10-28 PROCEDURE — 85018 HEMOGLOBIN: CPT | Performed by: INTERNAL MEDICINE

## 2018-10-28 PROCEDURE — 85014 HEMATOCRIT: CPT | Performed by: FAMILY MEDICINE

## 2018-10-28 PROCEDURE — 86900 BLOOD TYPING SEROLOGIC ABO: CPT

## 2018-10-28 PROCEDURE — 80048 BASIC METABOLIC PNL TOTAL CA: CPT | Performed by: FAMILY MEDICINE

## 2018-10-28 PROCEDURE — 85027 COMPLETE CBC AUTOMATED: CPT | Performed by: FAMILY MEDICINE

## 2018-10-28 PROCEDURE — 86920 COMPATIBILITY TEST SPIN: CPT

## 2018-10-28 PROCEDURE — 86901 BLOOD TYPING SEROLOGIC RH(D): CPT

## 2018-10-28 PROCEDURE — 99222 1ST HOSP IP/OBS MODERATE 55: CPT | Performed by: SURGERY

## 2018-10-28 PROCEDURE — 85014 HEMATOCRIT: CPT | Performed by: INTERNAL MEDICINE

## 2018-10-28 PROCEDURE — 86901 BLOOD TYPING SEROLOGIC RH(D): CPT | Performed by: INTERNAL MEDICINE

## 2018-10-28 PROCEDURE — 99232 SBSQ HOSP IP/OBS MODERATE 35: CPT | Performed by: INTERNAL MEDICINE

## 2018-10-28 PROCEDURE — 86900 BLOOD TYPING SEROLOGIC ABO: CPT | Performed by: INTERNAL MEDICINE

## 2018-10-28 PROCEDURE — G0378 HOSPITAL OBSERVATION PER HR: HCPCS

## 2018-10-28 RX ORDER — LATANOPROST 50 UG/ML
1 SOLUTION/ DROPS OPHTHALMIC NIGHTLY
Status: DISCONTINUED | OUTPATIENT
Start: 2018-10-28 | End: 2018-10-31 | Stop reason: HOSPADM

## 2018-10-28 RX ORDER — ACETAMINOPHEN 325 MG/1
650 TABLET ORAL ONCE
Status: COMPLETED | OUTPATIENT
Start: 2018-10-28 | End: 2018-10-28

## 2018-10-28 RX ORDER — HYDRALAZINE HYDROCHLORIDE 20 MG/ML
20 INJECTION INTRAMUSCULAR; INTRAVENOUS EVERY 6 HOURS PRN
Status: DISCONTINUED | OUTPATIENT
Start: 2018-10-28 | End: 2018-10-31 | Stop reason: HOSPADM

## 2018-10-28 RX ORDER — DEXTROSE AND SODIUM CHLORIDE 5; .9 G/100ML; G/100ML
75 INJECTION, SOLUTION INTRAVENOUS CONTINUOUS
Status: DISCONTINUED | OUTPATIENT
Start: 2018-10-28 | End: 2018-10-31 | Stop reason: HOSPADM

## 2018-10-28 RX ADMIN — ACETAMINOPHEN 650 MG: 325 TABLET, FILM COATED ORAL at 23:54

## 2018-10-28 RX ADMIN — PANTOPRAZOLE SODIUM 40 MG: 40 INJECTION, POWDER, FOR SOLUTION INTRAVENOUS at 01:21

## 2018-10-28 RX ADMIN — LISINOPRIL 10 MG: 10 TABLET ORAL at 09:18

## 2018-10-28 RX ADMIN — Medication 3 ML: at 01:22

## 2018-10-28 RX ADMIN — MELATONIN TAB 3 MG 3 MG: 3 TAB at 21:02

## 2018-10-28 RX ADMIN — Medication 10 ML: at 21:06

## 2018-10-28 RX ADMIN — DONEPEZIL HYDROCHLORIDE 20 MG: 5 TABLET ORAL at 21:00

## 2018-10-28 RX ADMIN — CITALOPRAM HYDROBROMIDE 20 MG: 20 TABLET ORAL at 09:18

## 2018-10-28 RX ADMIN — LATANOPROST 1 DROP: 50 SOLUTION OPHTHALMIC at 21:00

## 2018-10-28 RX ADMIN — PANTOPRAZOLE SODIUM 40 MG: 40 INJECTION, POWDER, FOR SOLUTION INTRAVENOUS at 09:18

## 2018-10-28 RX ADMIN — Medication 3 ML: at 21:00

## 2018-10-28 RX ADMIN — AMLODIPINE BESYLATE 2.5 MG: 5 TABLET ORAL at 09:18

## 2018-10-28 RX ADMIN — PANTOPRAZOLE SODIUM 40 MG: 40 INJECTION, POWDER, FOR SOLUTION INTRAVENOUS at 21:02

## 2018-10-28 NOTE — TELEPHONE ENCOUNTER
At 1855 10/27/18 patient's son Denny Maurice notify exchange and he was contacted at 1900 in regards to patient being incontinent and him noticing blood from her bowels.  He indicated that Dr. Vermeesch was aware of the incontinence but he states that she had a little bit the first time she was changed and then the second times it was a lot more.  He didn't have a way to check her blood pressure but he wanted advice on what he should do.  He indicated that she was not on any blood thinners that he was aware of.  He stated that the blood was dark and not bright red.  I advised him to take her to the ER.  He questioned which ER he should take her to, [facility] or [facility].  I advised him [facility] since she was stable. He indicated understanding.    At a later time I checked the patient's chart and she did arrive at ER and was admitted.

## 2018-10-29 LAB
ANION GAP SERPL CALCULATED.3IONS-SCNC: 11.8 MMOL/L (ref 10–20)
BUN BLD-MCNC: 21 MG/DL (ref 7–20)
BUN/CREAT SERPL: 30 (ref 7.1–23.5)
CALCIUM SPEC-SCNC: 8.3 MG/DL (ref 8.4–10.2)
CHLORIDE SERPL-SCNC: 102 MMOL/L (ref 98–107)
CO2 SERPL-SCNC: 26 MMOL/L (ref 26–30)
CREAT BLD-MCNC: 0.7 MG/DL (ref 0.6–1.3)
DEPRECATED RDW RBC AUTO: 48.9 FL (ref 37–54)
DEPRECATED RDW RBC AUTO: 51.3 FL (ref 37–54)
ERYTHROCYTE [DISTWIDTH] IN BLOOD BY AUTOMATED COUNT: 14.8 % (ref 11.5–14.5)
ERYTHROCYTE [DISTWIDTH] IN BLOOD BY AUTOMATED COUNT: 15.1 % (ref 11.5–14.5)
GFR SERPL CREATININE-BSD FRML MDRD: 78 ML/MIN/1.73
GLUCOSE BLD-MCNC: 94 MG/DL (ref 74–98)
HCT VFR BLD AUTO: 31 % (ref 37–47)
HCT VFR BLD AUTO: 31 % (ref 37–47)
HGB BLD-MCNC: 10.1 G/DL (ref 12–16)
HGB BLD-MCNC: 10.4 G/DL (ref 12–16)
MCH RBC QN AUTO: 30 PG (ref 27–31)
MCH RBC QN AUTO: 30.2 PG (ref 27–31)
MCHC RBC AUTO-ENTMCNC: 32.6 G/DL (ref 30–37)
MCHC RBC AUTO-ENTMCNC: 33.5 G/DL (ref 30–37)
MCV RBC AUTO: 90.1 FL (ref 81–99)
MCV RBC AUTO: 92 FL (ref 81–99)
PLATELET # BLD AUTO: 163 10*3/MM3 (ref 130–400)
PLATELET # BLD AUTO: 183 10*3/MM3 (ref 130–400)
PMV BLD AUTO: 10.8 FL (ref 6–12)
PMV BLD AUTO: 10.8 FL (ref 6–12)
POTASSIUM BLD-SCNC: 3.8 MMOL/L (ref 3.5–5.1)
RBC # BLD AUTO: 3.37 10*6/MM3 (ref 4.2–5.4)
RBC # BLD AUTO: 3.44 10*6/MM3 (ref 4.2–5.4)
SODIUM BLD-SCNC: 136 MMOL/L (ref 137–145)
WBC NRBC COR # BLD: 6.39 10*3/MM3 (ref 4.8–10.8)
WBC NRBC COR # BLD: 6.61 10*3/MM3 (ref 4.8–10.8)

## 2018-10-29 PROCEDURE — G0378 HOSPITAL OBSERVATION PER HR: HCPCS

## 2018-10-29 PROCEDURE — 85027 COMPLETE CBC AUTOMATED: CPT | Performed by: INTERNAL MEDICINE

## 2018-10-29 PROCEDURE — 85027 COMPLETE CBC AUTOMATED: CPT | Performed by: SURGERY

## 2018-10-29 PROCEDURE — P9016 RBC LEUKOCYTES REDUCED: HCPCS

## 2018-10-29 PROCEDURE — 80048 BASIC METABOLIC PNL TOTAL CA: CPT | Performed by: INTERNAL MEDICINE

## 2018-10-29 PROCEDURE — 36430 TRANSFUSION BLD/BLD COMPNT: CPT

## 2018-10-29 PROCEDURE — 86900 BLOOD TYPING SEROLOGIC ABO: CPT

## 2018-10-29 PROCEDURE — 99232 SBSQ HOSP IP/OBS MODERATE 35: CPT | Performed by: SURGERY

## 2018-10-29 RX ORDER — VIT A/VIT C/VIT E/ZINC/COPPER 2148-113
1 TABLET ORAL 2 TIMES DAILY
Status: DISCONTINUED | OUTPATIENT
Start: 2018-10-29 | End: 2018-10-30 | Stop reason: SDUPTHER

## 2018-10-29 RX ORDER — LISINOPRIL 5 MG/1
5 TABLET ORAL
Status: DISCONTINUED | OUTPATIENT
Start: 2018-10-29 | End: 2018-10-30

## 2018-10-29 RX ORDER — AMLODIPINE BESYLATE 5 MG/1
2.5 TABLET ORAL
Status: DISCONTINUED | OUTPATIENT
Start: 2018-10-29 | End: 2018-10-31 | Stop reason: HOSPADM

## 2018-10-29 RX ADMIN — LISINOPRIL 5 MG: 5 TABLET ORAL at 10:00

## 2018-10-29 RX ADMIN — CITALOPRAM HYDROBROMIDE 20 MG: 20 TABLET ORAL at 08:19

## 2018-10-29 RX ADMIN — MELATONIN TAB 3 MG 3 MG: 3 TAB at 21:50

## 2018-10-29 RX ADMIN — DEXTROSE AND SODIUM CHLORIDE 75 ML/HR: 5; 900 INJECTION, SOLUTION INTRAVENOUS at 16:10

## 2018-10-29 RX ADMIN — AMLODIPINE BESYLATE 2.5 MG: 5 TABLET ORAL at 10:00

## 2018-10-29 RX ADMIN — LATANOPROST 1 DROP: 50 SOLUTION OPHTHALMIC at 21:50

## 2018-10-29 RX ADMIN — DONEPEZIL HYDROCHLORIDE 20 MG: 5 TABLET ORAL at 21:50

## 2018-10-29 RX ADMIN — VITAMIN D, TAB 1000IU (100/BT) 2000 UNITS: 25 TAB at 08:19

## 2018-10-29 RX ADMIN — PANTOPRAZOLE SODIUM 40 MG: 40 INJECTION, POWDER, FOR SOLUTION INTRAVENOUS at 21:50

## 2018-10-29 RX ADMIN — Medication 1 TABLET: at 22:21

## 2018-10-29 RX ADMIN — MULTIPLE VITAMINS W/ MINERALS TAB 1 TABLET: TAB at 08:19

## 2018-10-29 RX ADMIN — POLYETHYLENE GLYCOL 3350 255 G: 17 POWDER, FOR SOLUTION ORAL at 17:34

## 2018-10-29 RX ADMIN — PANTOPRAZOLE SODIUM 40 MG: 40 INJECTION, POWDER, FOR SOLUTION INTRAVENOUS at 08:20

## 2018-10-29 RX ADMIN — DEXTROSE AND SODIUM CHLORIDE 75 ML/HR: 5; 900 INJECTION, SOLUTION INTRAVENOUS at 03:06

## 2018-10-30 ENCOUNTER — ANESTHESIA EVENT (OUTPATIENT)
Dept: GASTROENTEROLOGY | Facility: HOSPITAL | Age: 83
End: 2018-10-30

## 2018-10-30 ENCOUNTER — ANESTHESIA (OUTPATIENT)
Dept: GASTROENTEROLOGY | Facility: HOSPITAL | Age: 83
End: 2018-10-30

## 2018-10-30 LAB
ABO + RH BLD: NORMAL
ANION GAP SERPL CALCULATED.3IONS-SCNC: 4.6 MMOL/L (ref 10–20)
BH BB BLOOD EXPIRATION DATE: NORMAL
BH BB BLOOD TYPE BARCODE: 6200
BH BB DISPENSE STATUS: NORMAL
BH BB PRODUCT CODE: NORMAL
BH BB UNIT NUMBER: NORMAL
BUN BLD-MCNC: 8 MG/DL (ref 7–20)
BUN/CREAT SERPL: 13.3 (ref 7.1–23.5)
CALCIUM SPEC-SCNC: 8.7 MG/DL (ref 8.4–10.2)
CHLORIDE SERPL-SCNC: 107 MMOL/L (ref 98–107)
CO2 SERPL-SCNC: 31 MMOL/L (ref 26–30)
CREAT BLD-MCNC: 0.6 MG/DL (ref 0.6–1.3)
CROSSMATCH INTERPRETATION: NORMAL
GFR SERPL CREATININE-BSD FRML MDRD: 93 ML/MIN/1.73
GLUCOSE BLD-MCNC: 121 MG/DL (ref 74–98)
GLUCOSE BLDC GLUCOMTR-MCNC: 121 MG/DL (ref 70–130)
HCT VFR BLD AUTO: 30.6 % (ref 37–47)
HCT VFR BLD AUTO: 32.7 % (ref 37–47)
HCT VFR BLD AUTO: 37.3 % (ref 37–47)
HGB BLD-MCNC: 10.2 G/DL (ref 12–16)
HGB BLD-MCNC: 10.9 G/DL (ref 12–16)
HGB BLD-MCNC: 12.4 G/DL (ref 12–16)
POTASSIUM BLD-SCNC: 3.6 MMOL/L (ref 3.5–5.1)
SODIUM BLD-SCNC: 139 MMOL/L (ref 137–145)
UNIT  ABO: NORMAL
UNIT  RH: NORMAL

## 2018-10-30 PROCEDURE — 85018 HEMOGLOBIN: CPT | Performed by: HOSPITALIST

## 2018-10-30 PROCEDURE — 0DBN8ZX EXCISION OF SIGMOID COLON, VIA NATURAL OR ARTIFICIAL OPENING ENDOSCOPIC, DIAGNOSTIC: ICD-10-PCS | Performed by: SURGERY

## 2018-10-30 PROCEDURE — 97165 OT EVAL LOW COMPLEX 30 MIN: CPT

## 2018-10-30 PROCEDURE — 85014 HEMATOCRIT: CPT | Performed by: HOSPITALIST

## 2018-10-30 PROCEDURE — 99231 SBSQ HOSP IP/OBS SF/LOW 25: CPT | Performed by: HOSPITALIST

## 2018-10-30 PROCEDURE — 90661 CCIIV3 VAC ABX FR 0.5 ML IM: CPT | Performed by: FAMILY MEDICINE

## 2018-10-30 PROCEDURE — 97161 PT EVAL LOW COMPLEX 20 MIN: CPT

## 2018-10-30 PROCEDURE — G0008 ADMIN INFLUENZA VIRUS VAC: HCPCS | Performed by: FAMILY MEDICINE

## 2018-10-30 PROCEDURE — 80048 BASIC METABOLIC PNL TOTAL CA: CPT | Performed by: HOSPITALIST

## 2018-10-30 PROCEDURE — 25010000002 INFLUENZA VAC SUBUNIT QUAD 0.5 ML SUSPENSION PREFILLED SYRINGE: Performed by: FAMILY MEDICINE

## 2018-10-30 PROCEDURE — 25010000002 HYDRALAZINE PER 20 MG: Performed by: FAMILY MEDICINE

## 2018-10-30 PROCEDURE — 82962 GLUCOSE BLOOD TEST: CPT

## 2018-10-30 PROCEDURE — 25010000002 PROPOFOL 200 MG/20ML EMULSION: Performed by: NURSE ANESTHETIST, CERTIFIED REGISTERED

## 2018-10-30 PROCEDURE — 0DJ08ZZ INSPECTION OF UPPER INTESTINAL TRACT, VIA NATURAL OR ARTIFICIAL OPENING ENDOSCOPIC: ICD-10-PCS | Performed by: SURGERY

## 2018-10-30 RX ORDER — VITS A,C,E/LUTEIN/MINERALS 300MCG-200
1 TABLET ORAL DAILY
Status: DISCONTINUED | OUTPATIENT
Start: 2018-10-30 | End: 2018-10-31 | Stop reason: HOSPADM

## 2018-10-30 RX ORDER — LISINOPRIL 5 MG/1
5 TABLET ORAL ONCE
Status: COMPLETED | OUTPATIENT
Start: 2018-10-30 | End: 2018-10-30

## 2018-10-30 RX ORDER — PROPOFOL 10 MG/ML
INJECTION, EMULSION INTRAVENOUS AS NEEDED
Status: DISCONTINUED | OUTPATIENT
Start: 2018-10-30 | End: 2018-10-30 | Stop reason: SURG

## 2018-10-30 RX ORDER — SODIUM CHLORIDE, SODIUM LACTATE, POTASSIUM CHLORIDE, CALCIUM CHLORIDE 600; 310; 30; 20 MG/100ML; MG/100ML; MG/100ML; MG/100ML
1000 INJECTION, SOLUTION INTRAVENOUS CONTINUOUS
Status: DISCONTINUED | OUTPATIENT
Start: 2018-10-30 | End: 2018-10-31 | Stop reason: HOSPADM

## 2018-10-30 RX ORDER — LISINOPRIL 10 MG/1
10 TABLET ORAL
Status: DISCONTINUED | OUTPATIENT
Start: 2018-10-31 | End: 2018-10-31 | Stop reason: HOSPADM

## 2018-10-30 RX ADMIN — MELATONIN TAB 3 MG 3 MG: 3 TAB at 20:28

## 2018-10-30 RX ADMIN — DEXTROSE AND SODIUM CHLORIDE 75 ML/HR: 5; 900 INJECTION, SOLUTION INTRAVENOUS at 03:35

## 2018-10-30 RX ADMIN — LISINOPRIL 5 MG: 5 TABLET ORAL at 09:37

## 2018-10-30 RX ADMIN — Medication 3 ML: at 09:00

## 2018-10-30 RX ADMIN — LATANOPROST 1 DROP: 50 SOLUTION OPHTHALMIC at 20:28

## 2018-10-30 RX ADMIN — PANTOPRAZOLE SODIUM 40 MG: 40 INJECTION, POWDER, FOR SOLUTION INTRAVENOUS at 20:28

## 2018-10-30 RX ADMIN — A/SINGAPORE/GP1908/2015 IVR-180 (H1N1) (AN A/MICHIGAN/45/2015-LIKE VIRUS), A/SINGAPORE/GP2050/2015 (H3N2) (AN A/HONG KONG/4801/2014 - LIKE VIRUS), B/UTAH/9/2014 (A B/PHUKET/3073/2013-LIKE VIRUS), B/HONG KONG/259/2010 (A B/BRISBANE/60/08-LIKE VIRUS) 0.5 ML: 15; 15; 15; 15 INJECTION, SUSPENSION INTRAMUSCULAR at 20:28

## 2018-10-30 RX ADMIN — SODIUM CHLORIDE, POTASSIUM CHLORIDE, SODIUM LACTATE AND CALCIUM CHLORIDE 1000 ML: 600; 310; 30; 20 INJECTION, SOLUTION INTRAVENOUS at 12:15

## 2018-10-30 RX ADMIN — CITALOPRAM HYDROBROMIDE 20 MG: 20 TABLET ORAL at 09:38

## 2018-10-30 RX ADMIN — LIDOCAINE HYDROCHLORIDE 50 MG: 20 INJECTION, SOLUTION INTRAVENOUS at 13:18

## 2018-10-30 RX ADMIN — AMLODIPINE BESYLATE 2.5 MG: 5 TABLET ORAL at 09:38

## 2018-10-30 RX ADMIN — DONEPEZIL HYDROCHLORIDE 20 MG: 5 TABLET ORAL at 20:28

## 2018-10-30 RX ADMIN — PROPOFOL 50 MG: 10 INJECTION, EMULSION INTRAVENOUS at 13:18

## 2018-10-30 RX ADMIN — PROPOFOL 20 MG: 10 INJECTION, EMULSION INTRAVENOUS at 13:35

## 2018-10-30 RX ADMIN — PROPOFOL 50 MG: 10 INJECTION, EMULSION INTRAVENOUS at 13:25

## 2018-10-30 RX ADMIN — PANTOPRAZOLE SODIUM 40 MG: 40 INJECTION, POWDER, FOR SOLUTION INTRAVENOUS at 09:37

## 2018-10-30 RX ADMIN — LISINOPRIL 5 MG: 5 TABLET ORAL at 15:01

## 2018-10-30 RX ADMIN — HYDRALAZINE HYDROCHLORIDE 20 MG: 20 INJECTION INTRAMUSCULAR; INTRAVENOUS at 15:01

## 2018-10-30 RX ADMIN — DEXTROSE AND SODIUM CHLORIDE 75 ML/HR: 5; 900 INJECTION, SOLUTION INTRAVENOUS at 20:33

## 2018-10-30 RX ADMIN — VITAMIN D, TAB 1000IU (100/BT) 2000 UNITS: 25 TAB at 09:37

## 2018-10-30 NOTE — ANESTHESIA POSTPROCEDURE EVALUATION
Patient: Adelia Maurice    Procedure Summary     Date:  10/30/18 Room / Location:  [facility] 3 / [facility]    Anesthesia Start:  1309 Anesthesia Stop:  1355    Procedures:       COLONOSCOPY, polypectomy (N/A Anus)      ESOPHAGOGASTRODUODENOSCOPY (N/A Esophagus) Diagnosis:       Gastrointestinal hemorrhage, unspecified gastrointestinal hemorrhage type      (Gastrointestinal hemorrhage, unspecified gastrointestinal hemorrhage type [K92.2])    Surgeon:  Harsh Sharif MD Provider:  Xochitl Fuentes CRNA    Anesthesia Type:  MAC ASA Status:  3          Anesthesia Type: MAC  Last vitals  BP   156/46 (10/30/18 1357)   Temp   97.9 °F (36.6 °C) (10/30/18 1357)   Pulse   58 (10/30/18 1357)   Resp   16 (10/30/18 1357)     SpO2   99 % (10/30/18 1357)     Post Anesthesia Care and Evaluation    Patient location during evaluation: PACU  Patient participation: complete - patient participated  Level of consciousness: awake and alert  Pain score: 0  Pain management: satisfactory to patient  Airway patency: patent  Anesthetic complications: No anesthetic complications  PONV Status: none  Cardiovascular status: acceptable and stable  Respiratory status: acceptable  Hydration status: acceptable

## 2018-10-30 NOTE — ANESTHESIA PREPROCEDURE EVALUATION
Anesthesia Evaluation     Patient summary reviewed and Nursing notes reviewed   no history of anesthetic complications:  NPO Solid Status: > 8 hours  NPO Liquid Status: > 8 hours           Airway   Mallampati: II  TM distance: >3 FB  Neck ROM: full  No difficulty expected  Dental - normal exam     Pulmonary - normal exam   (+) shortness of breath,   (-) not a smoker  Cardiovascular - normal exam  Exercise tolerance: poor (<4 METS)    PT is on anticoagulation therapy    (+) hypertension well controlled 2 medications or greater,       Neuro/Psych  (+) syncope, numbness (neuralgia), psychiatric history (dementia) Depression, dementia,     GI/Hepatic/Renal/Endo    (+)  GI bleeding,     Musculoskeletal     Abdominal    Substance History      OB/GYN          Other   (+) arthritis     ROS/Med Hx Other: Macular Degeneration   Anemia                  Anesthesia Plan    ASA 3     MAC   (Risks and benefits discussed including risk of aspiration, recall and dental damage. All patient questions answered. Will continue with POC.)  intravenous induction   Anesthetic plan, all risks, benefits, and alternatives have been provided, discussed and informed consent has been obtained with: patient.

## 2018-10-31 ENCOUNTER — NURSE TRIAGE (OUTPATIENT)
Dept: CALL CENTER | Facility: HOSPITAL | Age: 83
End: 2018-10-31

## 2018-10-31 VITALS
TEMPERATURE: 97.9 F | SYSTOLIC BLOOD PRESSURE: 148 MMHG | HEIGHT: 63 IN | RESPIRATION RATE: 16 BRPM | WEIGHT: 121.47 LBS | DIASTOLIC BLOOD PRESSURE: 61 MMHG | BODY MASS INDEX: 21.52 KG/M2 | HEART RATE: 70 BPM | OXYGEN SATURATION: 94 %

## 2018-10-31 PROCEDURE — 99238 HOSP IP/OBS DSCHRG MGMT 30/<: CPT | Performed by: HOSPITALIST

## 2018-10-31 PROCEDURE — 97116 GAIT TRAINING THERAPY: CPT

## 2018-10-31 PROCEDURE — 97530 THERAPEUTIC ACTIVITIES: CPT

## 2018-10-31 RX ORDER — SIMETHICONE 80 MG
80 TABLET,CHEWABLE ORAL 4 TIMES DAILY PRN
Status: DISCONTINUED | OUTPATIENT
Start: 2018-10-31 | End: 2018-10-31 | Stop reason: HOSPADM

## 2018-10-31 RX ADMIN — Medication 1 TABLET: at 09:43

## 2018-10-31 RX ADMIN — Medication 3 ML: at 09:44

## 2018-10-31 RX ADMIN — PANTOPRAZOLE SODIUM 40 MG: 40 INJECTION, POWDER, FOR SOLUTION INTRAVENOUS at 09:43

## 2018-10-31 RX ADMIN — AMLODIPINE BESYLATE 2.5 MG: 5 TABLET ORAL at 09:43

## 2018-10-31 RX ADMIN — LISINOPRIL 10 MG: 10 TABLET ORAL at 09:43

## 2018-10-31 RX ADMIN — CITALOPRAM HYDROBROMIDE 20 MG: 20 TABLET ORAL at 09:43

## 2018-10-31 RX ADMIN — VITAMIN D, TAB 1000IU (100/BT) 2000 UNITS: 25 TAB at 09:43

## 2018-10-31 NOTE — TELEPHONE ENCOUNTER
"Was diagnosed with diverticulitis and has diet questions and is oatmeal ok to eat and can she eat tomatoes, told him ok on oats and no on taomatoes googled a diet plan for her.     Reason for Disposition  • [1] Patient is improved AND [2] caller has simple question that triager can answer    Additional Information  • Negative: Sounds like a life-threatening emergency to the triager  • Negative: Discharged in past month from the hospital with a diagnosis of chronic obstructive pulmonic disease (COPD)  • Negative: Discharged in past month from the hospital with a diagnosis of congestive heart failure (CHF) or heart failure (HF)  • Negative: Discharged in past month from the hospital with a diagnosis of heart attack (myocardial infarction)  • Negative: Discharged in past month from the hospital with a diagnosis of pneumonia  • Negative: [1] Post-op AND [2] incision symptoms or questions  • Negative: [1] Post-op AND [2] general symptoms or questions  • Negative: Pain, redness, swelling, or pus at IV Site  • Negative: IV not running or running slowly  • Negative: Symptoms arising from use of a urinary catheter (Sherman or Coude)  • Negative: Medication question  • Negative: [1] New symptom AND [2] not a possible complication of hospitalized condition  • Negative: Patient sounds very sick or weak to the triager  • Negative: Sounds like a serious complication to the triager  • Negative: [1] Caller has URGENT question AND [2] triager unable to answer question  • Negative: [1] Discharged from hospital within this past week AND [2] taking Coumadin (warfarin) AND [3] no INR testing performed within 5 days of discharge  • Negative: [1] Caller has NON-URGENT question AND [2] triager unable to answer question    Answer Assessment - Initial Assessment Questions  1. MAIN CONCERN OR SYMPTOM:  \"What is your main concern right now?\" \"What question do you have?\" \"What's the main symptom you're worried about?\" (e.g., breathing difficulty, " "ankle swelling, weight gain.)      Diet concerns after discharge  2. ONSET: \"When did the  ________  start?\"      Diet diverticulitis  3. BETTER-SAME-WORSE: \"Are you getting better, staying the same, or getting worse compared to the day you were discharged?\"      na  4. HOSPITALIZATION: \"How long were you hospitalized?\" (e.g., days)      4 days  5. DISCHARGE DIAGNOSIS:  \"What problem or disease were you hospitalized for?\"      diverticulitis  6. DISCHARGE DATE: \"What date were you discharged from the hospital?\"      10/31/18  7. DISCHARGE DOCTOR: \"Who is the main doctor taking care of you now?\"      Dr. Vermeesch  8. DISCHARGE APPOINTMENT: \"Have you scheduled a follow-up discharge appointment with your doctor?\"      yes  9. DISCHARGE MEDICATIONS: \"Did the physician who discharged you order any new medications for you to use? If yes, have you filled the prescription and started taking the medication?\"       na  10. PAIN: \"Is there any pain?\" If so, ask: \"How bad is it?\"  (Scale 1-10; or mild, moderate, severe)        no  11. FEVER: \"Do you have a fever?\" If so, ask: \"What is it, how was it measured  and when did it start?\"        no  12. OTHER SYMPTOMS: \"Do you have any other symptoms?\"        Diet questions    Protocols used: POST-HOSPITALIZATION FOLLOW-UP CALL-ADULT-      "

## 2018-11-01 ENCOUNTER — READMISSION MANAGEMENT (OUTPATIENT)
Dept: CALL CENTER | Facility: HOSPITAL | Age: 83
End: 2018-11-01

## 2018-11-01 ENCOUNTER — TELEPHONE (OUTPATIENT)
Dept: INTERNAL MEDICINE | Facility: CLINIC | Age: 83
End: 2018-11-01

## 2018-11-01 ENCOUNTER — EPISODE CHANGES (OUTPATIENT)
Dept: SOCIAL WORK | Facility: HOSPITAL | Age: 83
End: 2018-11-01

## 2018-11-01 ENCOUNTER — TRANSITIONAL CARE MANAGEMENT TELEPHONE ENCOUNTER (OUTPATIENT)
Dept: INTERNAL MEDICINE | Facility: CLINIC | Age: 83
End: 2018-11-01

## 2018-11-01 NOTE — OUTREACH NOTE
ELAINE call completed.  Please refer to TCM call flowsheet for call documentation.    I called and spoke with pt's son Denny listed on [facility]. He states pt's been a little confused and weak post discharge. He states she's been eating and drinking since home but not much d/t decreased appetite. Denies n/v or abd pain. Small bm this am with no blood. He had asked what agency provides good home health/assistance. He said he was given a list of sitters and HH agencies by CM at the hospital. I encd him to refer to that list. He also wanted to be sure Dr Vermeesch was made aware that pt's aspirin was held at the hospital and recommended to continue hold until seen by cardio, has that f/u appt on 11/7/18. I assured him that a msg will be left for Dr Vermeesch. He confirms PCP appt 11/6/18, which is a 15 min f/u that was scheduled prior to her hospitalization, and wishes to keep as is.

## 2018-11-01 NOTE — TELEPHONE ENCOUNTER
Please tell him no aspirin at this time due to her recent GI bleed.  We are not allowed to tell him which home health agency or assistance to choose from.

## 2018-11-01 NOTE — OUTREACH NOTE
Prep Survey      Responses   Facility patient discharged from?  [facility]   Is patient eligible?  Yes   Discharge diagnosis  GI hemorrhage, Essential HTN, macular degeneration, acute blood loss anemia, s/p colonoscopy, polypectomy EGD   Does the patient have one of the following disease processes/diagnoses(primary or secondary)?  General Surgery   Does the patient have Home health ordered?  No   Is there a DME ordered?  No   Comments regarding appointments  See AVS   Prep survey completed?  Yes          Shea Zelaya RN

## 2018-11-01 NOTE — TELEPHONE ENCOUNTER
ELAINE call completed.  Please refer to TCM call flowsheet for call documentation.     I called and spoke with pt's son Denny listed on [facility]FREDA. He states pt's been a little confused and weak post discharge. He states she's been eating and drinking since home but has decreased appetite. Denies n/v. Small bm this am with no blood. He was asking what agency provides good home health/assitance. He said he was given a list of sitter and HH agencies by CM at the hospital. I encd him to refer to that list. He also wanted to make sure Dr Vermeesch was made aware that pt's aspirin was held at the hospital and recommended to continue hold until seen by cardio, has that f/u appt on 11/7/18. I assured him that a msg will be left for Dr Vermeesch but that she also has access to her hospital records since she was at [facility]. He confirms PCP appt 11/6/18, which is a 15 min f/u that was scheduled prior to her hospitalization, and wishes to keep as is.

## 2018-11-01 NOTE — THERAPY TREATMENT NOTE
Acute Care - Physical Therapy Treatment Note  [facility]     Patient Name: Adelia Maurice  : 1922  MRN: [id number]  Today's Date: 2018  Onset of Illness/Injury or Date of Surgery: 10/27/18  Date of Referral to PT: 10/30/18  Referring Physician: Dr. Sanchez    Admit Date: 10/27/2018    Visit Dx:    ICD-10-CM ICD-9-CM   1. Gastrointestinal hemorrhage, unspecified gastrointestinal hemorrhage type K92.2 578.9   2. Impaired functional mobility, balance, gait, and endurance Z74.09 V49.89   3. Impaired mobility and ADLs Z74.09 799.89     Patient Active Problem List   Diagnosis   • Depression   • Essential hypertension   • Memory loss   • Neuralgia   • Macular degeneration   • Arthritis of hip   • Carotid bruit   • Fatigue   • BMI 29.0-29.9,adult   • Environmental allergies   • Vitamin D deficiency   • Vitamin B 12 deficiency   • Gastrointestinal hemorrhage   • Acute blood loss anemia       Therapy Treatment                   Physical Therapy Education     Title: PT OT SLP Therapies (Done)     Topic: Physical Therapy (Done)     Point: Mobility training (Done)    Learning Progress Summary     Learner Status Readiness Method Response Comment Documented by    Patient Done Acceptance E,TB,D VU,NR   18 0854     Done Acceptance E,TB VU Purpose of PT/POC.  10/30/18 1631          Point: Home exercise program (Resolved)    Learning Progress Summary     Learner Status Readiness Method Response Comment Documented by    Patient Done Acceptance E,TB VU Purpose of PT/POC.  10/30/18 1631          Point: Precautions (Resolved)    Learning Progress Summary     Learner Status Readiness Method Response Comment Documented by    Patient Done Acceptance E,TB VU Purpose of PT/POC.  10/30/18 1631                      User Key     Initials Effective Dates Name Provider Type Discipline     18 -  Geri Woodruff, PT Physical Therapist PT     18 -  Jose Angel Harris, PTA Physical Therapy Assistant PT                     PT Recommendation and Plan     Outcome Summary/Treatment Plan (PT)  Daily Summary of Progress (PT): progress toward functional goals is good  Plan for Continued Treatment (PT): Cont PT per POC.   Plan of Care Reviewed With: patient  Progress: improving  Outcome Summary: Pt presents with improved activity tolerance See flow sheet for details.           Outcome Measures     Row Name 10/31/18 1100 10/30/18 1555 10/30/18 1550       How much help from another person do you currently need...    Turning from your back to your side while in flat bed without using bedrails?  --  -- 3  -LM    Moving from lying on back to sitting on the side of a flat bed without bedrails?  --  -- 3  -LM    Moving to and from a bed to a chair (including a wheelchair)?  --  -- 3  -LM    Standing up from a chair using your arms (e.g., wheelchair, bedside chair)?  --  -- 3  -LM    Climbing 3-5 steps with a railing?  --  -- 2  -LM    To walk in hospital room?  --  -- 2  -LM    AM-PAC 6 Clicks Score  --  -- 16  -LM       How much help from another is currently needed...    Putting on and taking off regular lower body clothing? 2  -AH 2  -SD  --    Bathing (including washing, rinsing, and drying) 2  -AH 2  -SD  --    Toileting (which includes using toilet bed pan or urinal) 2  -AH 2  -SD  --    Putting on and taking off regular upper body clothing 3  -AH 3  -SD  --    Taking care of personal grooming (such as brushing teeth) 3  - 3  -SD  --    Eating meals 3  - 3  -SD  --    Score 15  -AH 15  -SD  --       Functional Assessment    Outcome Measure Options AM-PAC 6 Clicks Daily Activity (OT)  -AH AM-PAC 6 Clicks Daily Activity (OT)  -SD AM-PAC 6 Clicks Basic Mobility (PT)  -LM      User Key  (r) = Recorded By, (t) = Taken By, (c) = Cosigned By    Initials Name Provider Type    Raya Metzger Occupational Therapist    Geri Galindo, PT Physical Therapist    Yumi Rivas, OT Occupational Therapist           Time  Calculation:     Therapy Suggested Charges     Code   Minutes Charges    57980 (CPT®) Hc Pt Neuromusc Re Education Ea 15 Min      04724 (CPT®) Hc Pt Ther Proc Ea 15 Min      04765 (CPT®) Hc Gait Training Ea 15 Min 17 1    96330 (CPT®) Hc Pt Therapeutic Act Ea 15 Min      02056 (CPT®) Hc Pt Manual Therapy Ea 15 Min      13004 (CPT®) Hc Pt Iontophoresis Ea 15 Min      32560 (CPT®) Hc Pt Elec Stim Ea-Per 15 Min      42779 (CPT®) Hc Pt Ultrasound Ea 15 Min      70357 (CPT®) Hc Pt Self Care/Mgmt/Train Ea 15 Min      16737 (CPT®) Hc Pt Prosthetic (S) Train Initial Encounter, Each 15 Min      37425 (CPT®) Hc Pt Orthotic(S)/Prosthetic(S) Encounter, Each 15 Min      33754 (CPT®) Hc Orthotic(S) Mgmt/Train Initial Encounter, Each 15min      Total  17 1        Therapy Charges for Today     Code Description Service Date Service Provider Modifiers Qty    26099952830 HC GAIT TRAINING EA 15 MIN 10/31/2018 Jose Angel Harris, PTA GP 1          PT G-Codes  Outcome Measure Options: AM-PAC 6 Clicks Daily Activity (OT)  AM-PAC 6 Clicks Score: 16  Score: 15    Jose Angel Harris, PTA  11/1/2018

## 2018-11-03 LAB
LAB AP CASE REPORT: NORMAL
PATH REPORT.FINAL DX SPEC: NORMAL

## 2018-11-04 ENCOUNTER — READMISSION MANAGEMENT (OUTPATIENT)
Dept: CALL CENTER | Facility: HOSPITAL | Age: 83
End: 2018-11-04

## 2018-11-04 NOTE — OUTREACH NOTE
General Surgery Week 1 Survey      Responses   Facility patient discharged from?  [facility]   Does the patient have one of the following disease processes/diagnoses(primary or secondary)?  General Surgery   Is there a successful TCM telephone encounter documented?  Yes          Dejah Berrios RN

## 2018-11-06 ENCOUNTER — OFFICE VISIT (OUTPATIENT)
Dept: INTERNAL MEDICINE | Facility: CLINIC | Age: 83
End: 2018-11-06

## 2018-11-06 VITALS
HEIGHT: 63 IN | HEART RATE: 68 BPM | DIASTOLIC BLOOD PRESSURE: 64 MMHG | OXYGEN SATURATION: 97 % | TEMPERATURE: 97.4 F | SYSTOLIC BLOOD PRESSURE: 140 MMHG | WEIGHT: 124 LBS | BODY MASS INDEX: 21.97 KG/M2

## 2018-11-06 DIAGNOSIS — D62 ACUTE BLOOD LOSS ANEMIA: ICD-10-CM

## 2018-11-06 DIAGNOSIS — K92.2 GASTROINTESTINAL HEMORRHAGE, UNSPECIFIED GASTROINTESTINAL HEMORRHAGE TYPE: Primary | ICD-10-CM

## 2018-11-06 PROCEDURE — 99495 TRANSJ CARE MGMT MOD F2F 14D: CPT | Performed by: INTERNAL MEDICINE

## 2018-11-06 NOTE — PROGRESS NOTES
"Transitional Care Follow Up Visit  Subjective     Adelia TOSHIA Maurice is a 89 y.o. female who presents for a transitional care management visit.    Within 48 business hours after discharge our office contacted her via telephone to coordinate her care and needs.      I reviewed and discussed the details of that call along with the discharge summary, hospital problems, inpatient lab results, inpatient diagnostic studies, and consultation reports with Adelia.     Current outpatient and discharge medications have been reconciled for the patient.    Date of TCM Phone Call 11/1/2018   [facility]   Date of Admission 10/30/2018   Date of Discharge 10/31/2018   Discharge Disposition Home or Self Care     Risk for Readmission (LACE) Score: 7 (10/31/2018  6:00 AM)    History of Present Illness   Course During Hospital Stay:  Hospital Course:   Ms. Maurice is a 89 yo F with h/o HTN, dementia, chronic constipation who was admitted on 10/27/18 for rectal bleeding. Only blood thinner at home is half of aspirin 325mg. She denied any history of peptic ulcers. Labs on admission notable for hgb 11.4; FOBT positive; amylase/lipase wnl; Glc 162; UA: 2+ blood (RBC 6-12), 2+ protein, trace bacteria, 1+ ketones. Radiographs on admission notable for CT A/P: \"No acute disease.  The source of GI bleeding is not identified.\"  Patient was given tylenol, NS 500ml in ER. Patient was admitted for acute lower GI bleed and blood loss anemia, suspected diverticular in source. Dr. Sharif with general surgery was consulted for evaluation for scope. Serial hemoglobins were obtained, these were 10gm, decreased 8.7 on evening of hospital day 1, requiring 1 unit pRBC transfusion with stabilization in hemoglobin. Dr. Sharif did EGD and colonoscopy on 10/30/18. These were unremarkable for bleed. Colonoscopy did show diverticulosis. She has not had any further bloody bowel movements. She is passing gas and had formed brown stool today per " family. Dietician was consulted for counseling regarding dietary modifications for diverticulosis and provided family with list of foods to avoid with diverticulosis. PT/OT was consulted and has been working with patient. CM was consulted as family wanted to discuss home health options. CM provided a list of agency's and sitters for them as family would like to contact themselves.  Aspirin was held during admission and recommend continue to hold until follow-up with cardiologist in 1 week.     She denies any further BRBPR since her discharge home.  She is not eating as much as she should.  She denies any abdominal pain, no black stools.  She has known history of diverticulitis and required surgery for this in the past.  Her son has noted a change in her appetite since we stopped the remeron.  If she sits down currently she just falls asleep, but it is fitful, she talks in her sleep.  Her son states she is depressed, she is at his house or his brothers and she is upset about this.  Currently she is on Celexa 20 mg daily in the morning.  She is more unsteady on her feet, at risk for fall.  She does not realize this.      The following portions of the patient's history were reviewed and updated as appropriate: allergies, current medications, past family history, past medical history, past social history, past surgical history and problem list.    Review of Systems   Constitutional: Positive for appetite change and fatigue. Negative for activity change and unexpected weight change.   Respiratory: Negative for shortness of breath.    Cardiovascular: Negative for chest pain, palpitations and leg swelling.   Gastrointestinal: Negative for abdominal pain, blood in stool, nausea and vomiting.   Musculoskeletal: Positive for gait problem.   Neurological: Positive for weakness. Negative for headaches.   Psychiatric/Behavioral: Positive for dysphoric mood.       Objective   Physical Exam   Constitutional: She is oriented to  person, place, and time. She appears well-developed and well-nourished.   Pleasant elderly female, she appears tired and weak, using a cane today   HENT:   Head: Normocephalic and atraumatic.   Mouth/Throat: Oropharynx is clear and moist.   Eyes: Pupils are equal, round, and reactive to light. Conjunctivae and EOM are normal.   Neck: Normal range of motion. Neck supple. No thyromegaly present.   Cardiovascular: Normal rate, regular rhythm, normal heart sounds and intact distal pulses.    No murmur heard.  Pulmonary/Chest: Effort normal and breath sounds normal. No respiratory distress. She has no wheezes.   Abdominal: Soft. Bowel sounds are normal. She exhibits no distension. There is tenderness.   Tenderness in midepigastric area to palpation   Musculoskeletal: Normal range of motion.   Lymphadenopathy:     She has no cervical adenopathy.   Neurological: She is alert and oriented to person, place, and time. No cranial nerve deficit.   Psychiatric: Her behavior is normal. Thought content normal.   Flat affect and depressed mood, patient rarely spoke throughout exam and interview today   Nursing note and vitals reviewed.      Assessment/Plan   Adelia was seen today for transitional care management.    Diagnoses and all orders for this visit:    Gastrointestinal hemorrhage, unspecified gastrointestinal hemorrhage type    Acute blood loss anemia    No current evidence of recurrent GI bleed  Recommend that patient stay with her family at this time due to ongoing weakness  Change Celexa to p.m. dosing due to underlying fatigue  Did not resume Remeron at this time, as noted above just changed timing of Celexa  Will obtain labs on next office visit, hemoglobin and hematocrit were in normal range one week ago  Patient is anxious to return home, explained to her it is in her best interest to stay with family now until her strength improves  Son is to resume vitamin D 2000 units daily  I also recommend over-the-counter Zyrtec  10 mg and Nasacort 2 sprays to each nare daily at bedtime due to chronic rhinorrhea    Return to clinic in 4 weeks for routine follow-up and labs    Discharge medications were reviewed and reconciled

## 2018-11-07 ENCOUNTER — OFFICE VISIT (OUTPATIENT)
Dept: CARDIOLOGY | Facility: CLINIC | Age: 83
End: 2018-11-07

## 2018-11-07 VITALS
HEIGHT: 63 IN | SYSTOLIC BLOOD PRESSURE: 138 MMHG | HEART RATE: 44 BPM | DIASTOLIC BLOOD PRESSURE: 46 MMHG | BODY MASS INDEX: 22.15 KG/M2 | WEIGHT: 125 LBS

## 2018-11-07 DIAGNOSIS — R60.0 LOWER EXTREMITY EDEMA: ICD-10-CM

## 2018-11-07 DIAGNOSIS — I10 ESSENTIAL HYPERTENSION: Primary | ICD-10-CM

## 2018-11-07 PROCEDURE — 99213 OFFICE O/P EST LOW 20 MIN: CPT | Performed by: INTERNAL MEDICINE

## 2018-11-07 NOTE — PROGRESS NOTES
Adelia POPE Percy  4/29/1922  [phone]  [phone]    11/07/18     PCP: Vermeesch, Marilyn K, MD  [address] [place], [place] [id number]    IDENTIFICATION: A 89 y.o. female retired [employer] , , and [facility] [place].    Chief Complaint   Patient presents with   • Shortness of Breath       Patient Active Problem List    Diagnosis   • Acute blood loss anemia [D62]   • Gastrointestinal hemorrhage [K92.2]   • Vitamin B 12 deficiency [E53.8]   • Vitamin D deficiency [E55.9]   • Environmental allergies [Z91.09]   • BMI 29.0-29.9,adult [Z68.29]   • Macular degeneration [H35.30]   • Arthritis of hip [M16.10]   • Carotid bruit [R09.89]   • Fatigue [R53.83]   • Depression [F32.9]   • Essential hypertension [I10]   • Memory loss [R41.3]   • Neuralgia [M79.2]     PROBLEM LIST:  1. Dyspnea:  1. June 2011: D-dimer 1700 with negative CTA for pulmonary embolus.   2. Syncope and collapse:  1. Holter, June 2011 with occasional PAC/PVC, no tachy or bradyarrhythmias to account of syncopal episode.  2. Carotid duplex 12/15: Bilateral mild-mod. Plaque without hemodynamically significant stenosis.  3. Syncope and fall 2/11/17, eval at [facility] and dishcarge home.  3. Hypertension, presumed essential.  4. Unknown lipid status.  5. Remote valve surgery, 1996 and 2007.  6. GI bleed 10/2018 [facility] hgb 8.7 upper/lower scope tics only.    Allergies  Allergies   Allergen Reactions   • Atorvastatin Other (See Comments)   • Crestor  [Rosuvastatin Calcium] Other (See Comments)   • Other    • Penicillins Other (See Comments)   • Tetanus Toxoid Other (See Comments)   • Zocor  [Simvastatin] Other (See Comments)       Current Medications    Current Outpatient Prescriptions:   •  amLODIPine (NORVASC) 2.5 MG tablet, Take 1 tablet by mouth Daily., Disp: 30 tablet, Rfl: 12  •  cholecalciferol (VITAMIN D3) 1000 units tablet, Take 2,000 Units by mouth Daily., Disp: , Rfl:   •  citalopram (CeleXA) 20 MG tablet, Take  "1 tablet by mouth Daily., Disp: 30 tablet, Rfl: 12  •  donepezil (ARICEPT) 10 MG tablet, TAKE TWO TABLETS BY MOUTH EVERY NIGHT, Disp: 60 tablet, Rfl: 3  •  latanoprost (XALATAN) 0.005 % ophthalmic solution, Administer 1 drop to both eyes every night., Disp: , Rfl:   •  lisinopril (PRINIVIL,ZESTRIL) 10 MG tablet, TAKE ONE TABLET BY MOUTH DAILY, Disp: 90 tablet, Rfl: 1  •  melatonin 3 MG tablet, Take 3 mg by mouth Every Night., Disp: , Rfl:   •  Multiple Vitamins-Minerals (PRESERVISION AREDS PO), Take  by mouth 2 (Two) Times a Day., Disp: , Rfl:   •  Turmeric Curcumin 500 MG capsule, Take  by mouth 2 (Two) Times a Day., Disp: , Rfl:   •  vitamin B-12 (CYANOCOBALAMIN) 100 MCG tablet, Take 10 mcg by mouth 3 (Three) Times a Week. Takes Monday, Wednesday and friday, Disp: , Rfl:     History of Present Illness   Shortness of Breath     Palpitations    Associated symptoms include shortness of breath.     Patient presents in follow up.  She was recently hospitalized for a GI bleed. Her hgb dropped from 10.2 to 8.7 in 6 hours and she received 1 unit of blood during this stay. She had a scope and no source of bleeding was found, but the bleed self-resolved. Suspect it might've been diverticulosis. Her aspirin was held.  Her hgb was 12.4 upon d/c.      ROS:  All systems have been reviewed and are negative with the exception of those mentioned in the HPI.    OBJECTIVE:  Vitals:    11/07/18 1414   BP: 138/46   BP Location: Right arm   Patient Position: Sitting   Pulse: (!) 44   Weight: 56.7 kg (125 lb)   Height: 160 cm (63\")     Physical Exam   Constitutional: She is oriented to person, place, and time. She appears well-developed and well-nourished.   Neck: Normal range of motion. Neck supple. No hepatojugular reflux and no JVD present. Carotid bruit is not present. No tracheal deviation present. No thyromegaly present.   Cardiovascular: Normal rate, regular rhythm, S1 normal, S2 normal, intact distal pulses and normal pulses.   " Occasional extrasystoles are present. PMI is not displaced.  Exam reveals no gallop, no distant heart sounds, no friction rub, no midsystolic click and no opening snap.    No murmur heard.  Pulses:       Radial pulses are 2+ on the right side, and 2+ on the left side.        Dorsalis pedis pulses are 2+ on the right side, and 2+ on the left side.        Posterior tibial pulses are 2+ on the right side, and 2+ on the left side.   Pulmonary/Chest: Effort normal and breath sounds normal. She has no wheezes. She has no rales.   Abdominal: Soft. Bowel sounds are normal. She exhibits no mass. There is no tenderness. There is no guarding.   Musculoskeletal: Normal range of motion. She exhibits no edema or tenderness.   Varicosities bilateral lower extremities.   Neurological: She is alert and oriented to person, place, and time.   Psychiatric: She has a normal mood and affect.   Nursing note and vitals reviewed.      Diagnostic Data:  Procedures  Reviewed lab data and ECG from her ED visit on 2/11/17.    ASSESSMENT:   Diagnosis Plan   1. Essential hypertension     2. Lower extremity edema         PLAN:   Recommend pt continue to hold asa at this time. Pt may resume baby asa in 4-6 weeks as long as H&H remained stable.  Recommend for pt to take a multivitamin to build her iron back up. Gummy vitamin would be appealing to her.     Hypertension with  BP acceptable today.     Lower extremity edema recommended  compression stockings     Scribed for Avi Velez MD by Gabby Gonsales PA-C. 11/7/2018  2:26 PM   IAvi MD, personally performed the services described in this documentation as scribed by the above named individual in my presence, and it is both accurate and complete.  11/7/2018  2:26 PM

## 2018-11-08 ENCOUNTER — READMISSION MANAGEMENT (OUTPATIENT)
Dept: CALL CENTER | Facility: HOSPITAL | Age: 83
End: 2018-11-08

## 2018-11-08 NOTE — OUTREACH NOTE
General Surgery Week 2 Survey      Responses   Facility patient discharged from?  [facility]   Does the patient have one of the following disease processes/diagnoses(primary or secondary)?  General Surgery   Week 2 attempt successful?  Yes   Call start time  1408   Call end time  1411   Discharge diagnosis  GI hemorrhage, Essential HTN, macular degeneration, acute blood loss anemia, s/p colonoscopy, polypectomy EGD   Person spoke with today (if not patient) and relationship  Son   Meds reviewed with patient/caregiver?  Yes   Is the patient having any side effects they believe may be caused by any medication additions or changes?  No   Does the patient have all medications related to this admission filled (includes all antibiotics, pain medications, etc.)  Yes   Is the patient taking all medications as directed (includes completed medication regime)?  Yes   Medication comments  stopped ASA for 1 month   Does the patient have a follow up appointment scheduled with their surgeon?  Yes   Has the patient kept scheduled appointments due by today?  Yes   Comments  saw this week--everything looking good   Has home health visited the patient within 72 hours of discharge?  N/A   Psychosocial issues?  No   Did the patient receive a copy of their discharge instructions?  Yes   Nursing interventions  Reviewed instructions with patient   What is the patient's perception of their health status since discharge?  Improving   Nursing interventions  Nurse provided patient education   Is the patient/caregiver able to teach back the hierarchy of who to call/visit for symptoms/problems? PCP, Specialist, Home health nurse, Urgent Care, ED, 911  Yes   Additional teach back comments  No further GI bleeding. Son states she is doing well.    Week 2 call completed?  Yes          Gerri Nelson RN

## 2018-11-15 ENCOUNTER — READMISSION MANAGEMENT (OUTPATIENT)
Dept: CALL CENTER | Facility: HOSPITAL | Age: 83
End: 2018-11-15

## 2018-11-15 NOTE — OUTREACH NOTE
General Surgery Week 3 Survey      Responses   Facility patient discharged from?  [facility]   Does the patient have one of the following disease processes/diagnoses(primary or secondary)?  General Surgery   Week 3 attempt successful?  Yes   Call start time  1152   Call end time  1157   Discharge diagnosis  GI hemorrhage, Essential HTN, macular degeneration, acute blood loss anemia, s/p colonoscopy, polypectomy EGD   Person spoke with today (if not patient) and relationship  Son   Meds reviewed with patient/caregiver?  Yes   Is the patient taking all medications as directed (includes completed medication regime)?  N/A   Has the patient kept scheduled appointments due by today?  Yes   What is the patient's perception of their health status since discharge?  Improving   Is the patient /caregiver able to teach back basic post-op care?  Take showers only when approved by MD-sponge bathe until then   Is the patient/caregiver able to teach back steps to recovery at home?  Eat a well-balance diet, Rest and rebuild strength, gradually increase activity   Additional teach back comments  Pt is a lot better. She is not moving around a lot. uses walker. no futher bleeding. Eating good. Has usual SOB. Pt is at office with son today. Pt is staying with son's at night.    Week 3 call completed?  Yes          Sneha Marie RN

## 2018-11-19 ENCOUNTER — EPISODE CHANGES (OUTPATIENT)
Dept: SOCIAL WORK | Facility: HOSPITAL | Age: 83
End: 2018-11-19

## 2018-12-10 ENCOUNTER — OFFICE VISIT (OUTPATIENT)
Dept: INTERNAL MEDICINE | Facility: CLINIC | Age: 83
End: 2018-12-10

## 2018-12-10 VITALS
OXYGEN SATURATION: 97 % | DIASTOLIC BLOOD PRESSURE: 80 MMHG | SYSTOLIC BLOOD PRESSURE: 122 MMHG | TEMPERATURE: 98.7 F | RESPIRATION RATE: 16 BRPM | WEIGHT: 117.4 LBS | HEIGHT: 63 IN | HEART RATE: 81 BPM | BODY MASS INDEX: 20.8 KG/M2

## 2018-12-10 DIAGNOSIS — D62 ACUTE BLOOD LOSS ANEMIA: Primary | ICD-10-CM

## 2018-12-10 DIAGNOSIS — M25.561 ACUTE PAIN OF RIGHT KNEE: ICD-10-CM

## 2018-12-10 DIAGNOSIS — E53.8 VITAMIN B 12 DEFICIENCY: ICD-10-CM

## 2018-12-10 LAB
ALBUMIN SERPL-MCNC: 4.6 G/DL (ref 3.5–5)
ALBUMIN/GLOB SERPL: 1.5 G/DL (ref 1–2)
ALP SERPL-CCNC: 78 U/L (ref 38–126)
ALT SERPL-CCNC: 13 U/L (ref 13–69)
AST SERPL-CCNC: 22 U/L (ref 15–46)
BASOPHILS # BLD AUTO: 0.03 10*3/MM3 (ref 0–0.2)
BASOPHILS NFR BLD AUTO: 0.3 % (ref 0–2.5)
BILIRUB SERPL-MCNC: 0.9 MG/DL (ref 0.2–1.3)
BUN SERPL-MCNC: 20 MG/DL (ref 7–20)
BUN/CREAT SERPL: 28.6 (ref 7.1–23.5)
CALCIUM SERPL-MCNC: 9.7 MG/DL (ref 8.4–10.2)
CHLORIDE SERPL-SCNC: 98 MMOL/L (ref 98–107)
CO2 SERPL-SCNC: 26 MMOL/L (ref 26–30)
CREAT SERPL-MCNC: 0.7 MG/DL (ref 0.6–1.3)
EOSINOPHIL # BLD AUTO: 0.04 10*3/MM3 (ref 0–0.7)
EOSINOPHIL NFR BLD AUTO: 0.4 % (ref 0–7)
ERYTHROCYTE [DISTWIDTH] IN BLOOD BY AUTOMATED COUNT: 14.1 % (ref 11.5–14.5)
GLOBULIN SER CALC-MCNC: 3.1 GM/DL
GLUCOSE SERPL-MCNC: 142 MG/DL (ref 74–98)
HCT VFR BLD AUTO: 42.1 % (ref 37–47)
HGB BLD-MCNC: 13.4 G/DL (ref 12–16)
IMM GRANULOCYTES # BLD: 0.05 10*3/MM3 (ref 0–0.06)
IMM GRANULOCYTES NFR BLD: 0.5 % (ref 0–0.6)
LYMPHOCYTES # BLD AUTO: 1.36 10*3/MM3 (ref 0.6–3.4)
LYMPHOCYTES NFR BLD AUTO: 13.8 % (ref 10–50)
MCH RBC QN AUTO: 30.5 PG (ref 27–31)
MCHC RBC AUTO-ENTMCNC: 31.8 G/DL (ref 30–37)
MCV RBC AUTO: 95.9 FL (ref 81–99)
MONOCYTES # BLD AUTO: 0.65 10*3/MM3 (ref 0–0.9)
MONOCYTES NFR BLD AUTO: 6.6 % (ref 0–12)
NEUTROPHILS # BLD AUTO: 7.7 10*3/MM3 (ref 2–6.9)
NEUTROPHILS NFR BLD AUTO: 78.4 % (ref 37–80)
NRBC BLD AUTO-RTO: 0 /100 WBC (ref 0–0)
PLATELET # BLD AUTO: 273 10*3/MM3 (ref 130–400)
POTASSIUM SERPL-SCNC: 4.2 MMOL/L (ref 3.5–5.1)
PROT SERPL-MCNC: 7.7 G/DL (ref 6.3–8.2)
RBC # BLD AUTO: 4.39 10*6/MM3 (ref 4.2–5.4)
SODIUM SERPL-SCNC: 136 MMOL/L (ref 137–145)
VIT B12 SERPL-MCNC: 661 PG/ML (ref 239–931)
WBC # BLD AUTO: 9.83 10*3/MM3 (ref 4.8–10.8)

## 2018-12-10 PROCEDURE — 99214 OFFICE O/P EST MOD 30 MIN: CPT | Performed by: PHYSICIAN ASSISTANT

## 2018-12-10 RX ORDER — PREDNISONE 20 MG/1
20 TABLET ORAL DAILY
Qty: 5 TABLET | Refills: 0 | Status: SHIPPED | OUTPATIENT
Start: 2018-12-10 | End: 2019-06-04

## 2018-12-10 NOTE — PROGRESS NOTES
Chief Complaint   Patient presents with   • Follow-up     Diverticulitis, needs lab work   • Knee Pain     Right knee pain, denies any injury or trauma. X 3 days       Subjective   Adelia Maurice is a 89 y.o. female with history of GI hemorrhage and acute blood loss anemia.  She present today for follow up on anemia.  She has additional complaint of right knee pain.    History of Present Illness     Anemia:  Patient was admitted for GI hemorrhage at the beginning of November.  Patient denies any recurrence of BRBPR or melena.   Son is present today, reports that she has continued fatigue and decreased appetite.  Patient reports that she eats, but nothing sounds palatable.  She has continued staying at her son's houses, and expresses that she has had a difficult time adjusting to this.  She denies any shortness of breath, chest pain, or dizziness.      Right knee pain:  Pain began Saturday morning.  Patient has poor memory, and is unable to recall if any trauma occurred.  They have been using ice/heat and ibuprofen.  She denies any history of gout.  There have been no fever, chills, nausea, vomiting, or diarrhea.  The knee is swollen and mildly warm to the touch.      Past Medical History:   Diagnosis Date   • Dementia    • Dyspnea    • Environmental allergies    • Hypertension    • Macular degeneration 6/23/2016   • Syncope      Past Surgical History:   Procedure Laterality Date   • ADENOIDECTOMY     • APPENDECTOMY     • COLON SURGERY     • COLONOSCOPY     • DENTAL PROCEDURE     • TONSILLECTOMY       Family History   Problem Relation Age of Onset   • Cancer Mother    • Diabetes Mother    • Heart attack Father    • Cancer Sister      Social History     Socioeconomic History   • Marital status:      Spouse name: Not on file   • Number of children: Not on file   • Years of education: Not on file   • Highest education level: Not on file   Social Needs   • Financial resource strain: Not on file   • Food  insecurity - worry: Not on file   • Food insecurity - inability: Not on file   • Transportation needs - medical: Not on file   • Transportation needs - non-medical: Not on file   Occupational History   • Not on file   Tobacco Use   • Smoking status: Never Smoker   • Smokeless tobacco: Never Used   Substance and Sexual Activity   • Alcohol use: No   • Drug use: No   • Sexual activity: Defer   Other Topics Concern   • Not on file   Social History Narrative   • Not on file     Allergies   Allergen Reactions   • Atorvastatin Other (See Comments)   • Crestor  [Rosuvastatin Calcium] Other (See Comments)   • Other    • Penicillins Other (See Comments)   • Tetanus Toxoid Other (See Comments)   • Zocor  [Simvastatin] Other (See Comments)         Review of Systems   Constitutional: Positive for activity change, appetite change, fatigue and unexpected weight loss. Negative for diaphoresis and fever.   Respiratory: Negative for chest tightness and shortness of breath.    Cardiovascular: Negative for chest pain.   Gastrointestinal: Negative for abdominal pain, blood in stool, constipation, nausea and vomiting.   Genitourinary: Negative for dysuria.   Musculoskeletal: Positive for arthralgias and joint swelling. Negative for myalgias.   Skin: Negative for color change.   Neurological: Negative for dizziness and light-headedness.   Psychiatric/Behavioral: Negative for sleep disturbance.     Objective     Vitals:    12/10/18 0941   BP: 122/80   Pulse: 81   Resp: 16   Temp: 98.7 °F (37.1 °C)   SpO2: 97%       Physical Exam   Constitutional: She is oriented to person, place, and time. She appears well-developed and well-nourished. No distress.   HENT:   Head: Normocephalic and atraumatic.   Eyes: EOM are normal. Pupils are equal, round, and reactive to light.   Neck: Normal range of motion. Neck supple.   Cardiovascular: Normal rate, regular rhythm, normal heart sounds and intact distal pulses. Exam reveals no gallop and no friction  rub.   No murmur heard.  Pulmonary/Chest: Effort normal and breath sounds normal. No respiratory distress. She has no wheezes. She has no rales.   Musculoskeletal:        Right knee: She exhibits decreased range of motion and swelling. She exhibits no bony tenderness. Tenderness found.   Mildly warm to touch, no erythema/bruising.  There is tenderness anteriorly and posteriorly.  There is no calf tenderness, swelling, negative homans' sign.    Neurological: She is alert and oriented to person, place, and time.   Skin: Skin is warm and dry. Capillary refill takes less than 2 seconds. She is not diaphoretic.   Psychiatric: She has a normal mood and affect. Her behavior is normal.   Nursing note and vitals reviewed.    Assessment/Plan     Adelia was seen today for follow-up and knee pain.    Diagnoses and all orders for this visit:    Acute blood loss anemia  -     Will obtain labs.  Have encouraged patient to maintain adequate hydration.  Will continue to monitor.    -     CBC & Differential  -     Comprehensive Metabolic Panel    Vitamin B 12 deficiency  -     CBC & Differential  -     Comprehensive Metabolic Panel  -     Vitamin B12    Acute pain of right knee  -     Will begin prednisone burst.  Can use Ace wrap for support.  Have advised son to discontinue use of Ibuprofen and use Tylenol for pain relief.  Continue use of ice and warm compresses.  Return to clinic if this is not improving or if symptoms worsen. May consider imaging.  -     predniSONE (DELTASONE) 20 MG tablet; Take 1 tablet by mouth Daily.        Return for Recheck.    Hiwot Berg PA-C

## 2018-12-20 RX ORDER — AMLODIPINE BESYLATE 2.5 MG/1
TABLET ORAL
Qty: 30 TABLET | Refills: 5 | Status: SHIPPED | OUTPATIENT
Start: 2018-12-20 | End: 2019-06-04 | Stop reason: SDUPTHER

## 2019-01-07 ENCOUNTER — OFFICE VISIT (OUTPATIENT)
Dept: INTERNAL MEDICINE | Facility: CLINIC | Age: 84
End: 2019-01-07

## 2019-01-07 VITALS
OXYGEN SATURATION: 94 % | SYSTOLIC BLOOD PRESSURE: 130 MMHG | DIASTOLIC BLOOD PRESSURE: 62 MMHG | TEMPERATURE: 97.8 F | HEART RATE: 90 BPM

## 2019-01-07 DIAGNOSIS — J06.9 VIRAL UPPER RESPIRATORY TRACT INFECTION: Primary | ICD-10-CM

## 2019-01-07 PROCEDURE — 99213 OFFICE O/P EST LOW 20 MIN: CPT | Performed by: INTERNAL MEDICINE

## 2019-01-07 NOTE — PROGRESS NOTES
Chief Complaint   Patient presents with   • Abstract     Coughing, sore throat, and chest congestion.      Subjective   Adelia Maurice is a 89 y.o. female.     Cough   This is a new problem. The current episode started in the past 7 days. The problem has been waxing and waning. The problem occurs hourly. The cough is productive of sputum. Associated symptoms include nasal congestion, postnasal drip and a sore throat. Pertinent negatives include no ear pain, fever, headaches, shortness of breath or wheezing. The symptoms are aggravated by lying down. Treatments tried: mucinex. The treatment provided mild relief. There is no history of asthma or COPD.        The following portions of the patient's history were reviewed and updated as appropriate: allergies, current medications, past family history, past medical history, past social history, past surgical history and problem list.    Review of Systems   Constitutional: Negative for fever.   HENT: Positive for postnasal drip and sore throat. Negative for ear pain.    Respiratory: Positive for cough. Negative for shortness of breath and wheezing.    Neurological: Negative for headaches.       Objective   /62   Pulse 90   Temp 97.8 °F (36.6 °C)   SpO2 94%   There is no height or weight on file to calculate BMI.  Physical Exam   Constitutional: She is oriented to person, place, and time. She appears well-developed and well-nourished. No distress.   Pleasant elderly female in no distress today   HENT:   Head: Normocephalic and atraumatic.   Mouth/Throat: Oropharynx is clear and moist.   Dried wax noted in canals  White postnasal drip   Eyes: Conjunctivae and EOM are normal. Pupils are equal, round, and reactive to light.   Neck: Normal range of motion. Neck supple. No thyromegaly present.   Cardiovascular: Normal rate, regular rhythm and normal heart sounds.   No murmur heard.  No carotid bruits   Pulmonary/Chest: Effort normal and breath sounds normal. No  respiratory distress. She has no wheezes.   Musculoskeletal: Normal range of motion.   Lymphadenopathy:     She has no cervical adenopathy.   Neurological: She is alert and oriented to person, place, and time. No cranial nerve deficit. Coordination normal.   Psychiatric: She has a normal mood and affect. Her behavior is normal. Thought content normal.   Flat affect noted today   Nursing note and vitals reviewed.      Assessment/Plan   Adelia Maurice is here today and the following problems have been addressed:      Adelia was seen today for abstract.    Diagnoses and all orders for this visit:    Viral upper respiratory tract infection    Continue Mucinex expectorant  Increase fluids and rest  Take Tylenol as needed for aches and pains  If symptoms worsen or persist, family is to call and I will call in a Z-Daniel later this week    Return to clinic in 2 months for routine follow-up visit    Please note that portions of this note were completed with a voice recognition program.  Efforts were made to edit dictation, but occasionally words are mistranscribed.

## 2019-02-11 ENCOUNTER — OFFICE VISIT (OUTPATIENT)
Dept: INTERNAL MEDICINE | Facility: CLINIC | Age: 84
End: 2019-02-11

## 2019-02-11 VITALS
DIASTOLIC BLOOD PRESSURE: 68 MMHG | HEART RATE: 80 BPM | SYSTOLIC BLOOD PRESSURE: 116 MMHG | WEIGHT: 122 LBS | BODY MASS INDEX: 21.62 KG/M2 | OXYGEN SATURATION: 95 % | TEMPERATURE: 97.3 F | HEIGHT: 63 IN

## 2019-02-11 DIAGNOSIS — E53.8 VITAMIN B 12 DEFICIENCY: ICD-10-CM

## 2019-02-11 DIAGNOSIS — I10 ESSENTIAL HYPERTENSION: Primary | ICD-10-CM

## 2019-02-11 DIAGNOSIS — F32.0 CURRENT MILD EPISODE OF MAJOR DEPRESSIVE DISORDER WITHOUT PRIOR EPISODE (HCC): ICD-10-CM

## 2019-02-11 DIAGNOSIS — E55.9 VITAMIN D DEFICIENCY: ICD-10-CM

## 2019-02-11 PROBLEM — D62 ACUTE BLOOD LOSS ANEMIA: Status: RESOLVED | Noted: 2018-10-28 | Resolved: 2019-02-11

## 2019-02-11 PROBLEM — K92.2 GASTROINTESTINAL HEMORRHAGE: Status: RESOLVED | Noted: 2018-10-27 | Resolved: 2019-02-11

## 2019-02-11 PROBLEM — J06.9 VIRAL UPPER RESPIRATORY TRACT INFECTION: Status: RESOLVED | Noted: 2019-01-07 | Resolved: 2019-02-11

## 2019-02-11 PROCEDURE — 99213 OFFICE O/P EST LOW 20 MIN: CPT | Performed by: INTERNAL MEDICINE

## 2019-02-11 NOTE — PROGRESS NOTES
Chief Complaint   Patient presents with   • Follow-up     for depression, HTN, and memory loss. Son states Pt's been having issues with her bowels but prune juice tends to help.      Subjective   Adelia Maurice is a 89 y.o. female.     Here today for follow-up of depression, dementia, HTN, DJD and vit D/B12 def.   Patient had significant GI bleed in October 2018.  EGD and colonoscopy at that time were unremarkable and could not find cause of her bleed.  Last CBC in December revealed normal hemoglobin and hematocrit.  She continues to live with her son.  She is sleeping well at night most nights.  She is taking melatonin with her PM meds between 6-8 PM.   She is eating 3 meals a day and snacking, her appetite goes up and down, wt is up 5 lbs.   No stomach pain or black stools.  She has had some constipation.  Prune juice is helpful.   Her BP is well controlled. She denies any CP, SOA, edema.   She remains on B12 and D as directed.   She has had several injections in right eye for macular degeneration, has stopped the bleeding but her vision has not changed.   She has had her flu shot.           The following portions of the patient's history were reviewed and updated as appropriate: allergies, current medications, past family history, past medical history, past social history, past surgical history and problem list.    Review of Systems   Constitutional: Negative for activity change, appetite change and unexpected weight change.   Eyes: Positive for visual disturbance.   Respiratory: Negative for shortness of breath.    Cardiovascular: Negative for chest pain, palpitations and leg swelling.   Gastrointestinal: Positive for constipation. Negative for abdominal pain.   Musculoskeletal: Positive for gait problem.   Neurological: Negative for headaches.   Psychiatric/Behavioral: Positive for confusion, dysphoric mood and sleep disturbance. The patient is not nervous/anxious.    All other systems reviewed and are  "negative.      Objective   /68   Pulse 80   Temp 97.3 °F (36.3 °C)   Ht 160 cm (63\")   Wt 55.3 kg (122 lb)   SpO2 95%   BMI 21.61 kg/m²   Body mass index is 21.61 kg/m².  Physical Exam   Constitutional: She appears well-developed and well-nourished. No distress.   Pleasant elderly female in no distress today   HENT:   Head: Normocephalic and atraumatic.   Mouth/Throat: Oropharynx is clear and moist.   Eyes: Conjunctivae and EOM are normal. Pupils are equal, round, and reactive to light.   Neck: Normal range of motion. Neck supple. No thyromegaly present.   Cardiovascular: Normal rate, regular rhythm, normal heart sounds and intact distal pulses.   No murmur heard.  No carotid bruits  Ectopic beat noted on occasion   Pulmonary/Chest: Effort normal and breath sounds normal. No respiratory distress. She has no wheezes.   Abdominal: Soft. Bowel sounds are normal. She exhibits no distension and no mass. There is tenderness. There is no rebound and no guarding.   Intermittent tenderness on palpation but no palpable masses   Musculoskeletal: Normal range of motion. She exhibits no edema.   Ambulates with use of a cane   Lymphadenopathy:     She has no cervical adenopathy.   Neurological: She is alert. No cranial nerve deficit. Coordination normal.   Psychiatric: Her behavior is normal. Thought content normal.   Nursing note and vitals reviewed.      Assessment/Plan   Adelia Maurice is here today and the following problems have been addressed:      Adelia was seen today for follow-up.    Diagnoses and all orders for this visit:    Essential hypertension    Current mild episode of major depressive disorder without prior episode (CMS/[facility])    Vitamin D deficiency    Vitamin B 12 deficiency    Other orders  -     aspirin 81 MG tablet; Take 1 tablet by mouth Daily. Take one tab every other day    Follow heart healthy/low salt diet  Avoid processed foods  Monitor blood pressure as discussed  Take all medications as " prescribed  Labs due next visit  Continue prune juice for constipation  Encouraged family to take her on occasional ride to help with underlying depression and anxiety  Recommended melatonin be taken at least 3 hours before onset of sleep to help with underlying insomnia  Continue vitamin D and B12 at current doses  Depression is stable on current dose of Celexa    RTC 3 mo     Please note that portions of this note were completed with a voice recognition program.  Efforts were made to edit dictation, but occasionally words are mistranscribed.

## 2019-02-21 RX ORDER — DONEPEZIL HYDROCHLORIDE 10 MG/1
TABLET, FILM COATED ORAL
Qty: 60 TABLET | Refills: 2 | Status: SHIPPED | OUTPATIENT
Start: 2019-02-21 | End: 2019-05-16 | Stop reason: SDUPTHER

## 2019-03-22 RX ORDER — LISINOPRIL 10 MG/1
TABLET ORAL
Qty: 90 TABLET | Refills: 0 | Status: SHIPPED | OUTPATIENT
Start: 2019-03-22 | End: 2019-06-04 | Stop reason: SDUPTHER

## 2019-04-25 RX ORDER — CITALOPRAM 20 MG/1
TABLET ORAL
Qty: 30 TABLET | Refills: 5 | Status: SHIPPED | OUTPATIENT
Start: 2019-04-25 | End: 2019-06-04 | Stop reason: SDUPTHER

## 2019-05-16 RX ORDER — DONEPEZIL HYDROCHLORIDE 10 MG/1
TABLET, FILM COATED ORAL
Qty: 60 TABLET | Refills: 1 | Status: SHIPPED | OUTPATIENT
Start: 2019-05-16 | End: 2019-06-04 | Stop reason: SDUPTHER

## 2019-06-04 ENCOUNTER — OFFICE VISIT (OUTPATIENT)
Dept: INTERNAL MEDICINE | Facility: CLINIC | Age: 84
End: 2019-06-04

## 2019-06-04 VITALS
OXYGEN SATURATION: 95 % | SYSTOLIC BLOOD PRESSURE: 168 MMHG | TEMPERATURE: 97.9 F | HEIGHT: 63 IN | DIASTOLIC BLOOD PRESSURE: 74 MMHG | WEIGHT: 120 LBS | HEART RATE: 80 BPM | BODY MASS INDEX: 21.26 KG/M2

## 2019-06-04 DIAGNOSIS — I10 ESSENTIAL HYPERTENSION: Primary | ICD-10-CM

## 2019-06-04 DIAGNOSIS — E53.8 VITAMIN B 12 DEFICIENCY: ICD-10-CM

## 2019-06-04 DIAGNOSIS — E55.9 VITAMIN D DEFICIENCY: ICD-10-CM

## 2019-06-04 DIAGNOSIS — R41.3 MEMORY LOSS: ICD-10-CM

## 2019-06-04 DIAGNOSIS — F32.0 CURRENT MILD EPISODE OF MAJOR DEPRESSIVE DISORDER WITHOUT PRIOR EPISODE (HCC): ICD-10-CM

## 2019-06-04 LAB
ALBUMIN SERPL-MCNC: 4.1 G/DL (ref 3.5–5)
ALBUMIN/GLOB SERPL: 1.4 G/DL (ref 1–2)
ALP SERPL-CCNC: 58 U/L (ref 38–126)
ALT SERPL-CCNC: 13 U/L (ref 13–69)
AST SERPL-CCNC: 19 U/L (ref 15–46)
BASOPHILS # BLD AUTO: 0.03 10*3/MM3 (ref 0–0.2)
BASOPHILS NFR BLD AUTO: 0.4 % (ref 0–1.5)
BILIRUB SERPL-MCNC: 0.4 MG/DL (ref 0.2–1.3)
BUN SERPL-MCNC: 27 MG/DL (ref 7–20)
BUN/CREAT SERPL: 33.8 (ref 7.1–23.5)
CALCIUM SERPL-MCNC: 9.4 MG/DL (ref 8.4–10.2)
CHLORIDE SERPL-SCNC: 103 MMOL/L (ref 98–107)
CO2 SERPL-SCNC: 29 MMOL/L (ref 26–30)
CREAT SERPL-MCNC: 0.8 MG/DL (ref 0.6–1.3)
EOSINOPHIL # BLD AUTO: 0.1 10*3/MM3 (ref 0–0.4)
EOSINOPHIL NFR BLD AUTO: 1.4 % (ref 0.3–6.2)
ERYTHROCYTE [DISTWIDTH] IN BLOOD BY AUTOMATED COUNT: 14.6 % (ref 12.3–15.4)
GLOBULIN SER CALC-MCNC: 2.9 GM/DL
GLUCOSE SERPL-MCNC: 154 MG/DL (ref 74–98)
HCT VFR BLD AUTO: 40.5 % (ref 34–46.6)
HGB BLD-MCNC: 12.8 G/DL (ref 12–15.9)
IMM GRANULOCYTES # BLD AUTO: 0.03 10*3/MM3 (ref 0–0.05)
IMM GRANULOCYTES NFR BLD AUTO: 0.4 % (ref 0–0.5)
LYMPHOCYTES # BLD AUTO: 1.95 10*3/MM3 (ref 0.7–3.1)
LYMPHOCYTES NFR BLD AUTO: 27.6 % (ref 19.6–45.3)
MCH RBC QN AUTO: 30.6 PG (ref 26.6–33)
MCHC RBC AUTO-ENTMCNC: 31.6 G/DL (ref 31.5–35.7)
MCV RBC AUTO: 96.9 FL (ref 79–97)
MONOCYTES # BLD AUTO: 0.44 10*3/MM3 (ref 0.1–0.9)
MONOCYTES NFR BLD AUTO: 6.2 % (ref 5–12)
NEUTROPHILS # BLD AUTO: 4.51 10*3/MM3 (ref 1.7–7)
NEUTROPHILS NFR BLD AUTO: 64 % (ref 42.7–76)
NRBC BLD AUTO-RTO: 0 /100 WBC (ref 0–0.2)
PLATELET # BLD AUTO: 294 10*3/MM3 (ref 140–450)
POTASSIUM SERPL-SCNC: 4.5 MMOL/L (ref 3.5–5.1)
PROT SERPL-MCNC: 7 G/DL (ref 6.3–8.2)
RBC # BLD AUTO: 4.18 10*6/MM3 (ref 3.77–5.28)
SODIUM SERPL-SCNC: 143 MMOL/L (ref 137–145)
WBC # BLD AUTO: 7.06 10*3/MM3 (ref 3.4–10.8)

## 2019-06-04 PROCEDURE — 99214 OFFICE O/P EST MOD 30 MIN: CPT | Performed by: INTERNAL MEDICINE

## 2019-06-04 RX ORDER — AMLODIPINE BESYLATE 2.5 MG/1
2.5 TABLET ORAL DAILY
Qty: 90 TABLET | Refills: 1 | Status: SHIPPED | OUTPATIENT
Start: 2019-06-04 | End: 2019-12-23

## 2019-06-04 RX ORDER — DONEPEZIL HYDROCHLORIDE 10 MG/1
20 TABLET, FILM COATED ORAL NIGHTLY
Qty: 180 TABLET | Refills: 1 | Status: SHIPPED | OUTPATIENT
Start: 2019-06-04 | End: 2020-01-24

## 2019-06-04 RX ORDER — LISINOPRIL 10 MG/1
10 TABLET ORAL DAILY
Qty: 90 TABLET | Refills: 1 | Status: SHIPPED | OUTPATIENT
Start: 2019-06-04 | End: 2020-01-24

## 2019-06-04 RX ORDER — CITALOPRAM 20 MG/1
20 TABLET ORAL DAILY
Qty: 90 TABLET | Refills: 1 | Status: SHIPPED | OUTPATIENT
Start: 2019-06-04 | End: 2019-12-13

## 2019-06-04 NOTE — PROGRESS NOTES
"Chief Complaint   Patient presents with   • Follow-up     3 months for depression and HTN.     Subjective   Adelia Maurice is a 89 y.o. female.     Here today for follow-up of depression, dementia, HTN, DJD and vit D/B12 def.   HTN-pressure is quite high today. She did take her meds today.  Her son does check BP at home, never higher than 142/70s.  She denies any CP, SOA, HA, palpitations or edema.   Depression/dementia- she is living with her sons now.  No recent depression.  She takes a low dose of benadryl in PM and she is sleeping better.  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  She has had shots in left eye.    She is using prune juice as needed for constipation.          The following portions of the patient's history were reviewed and updated as appropriate: allergies, current medications, past family history, past medical history, past social history, past surgical history and problem list.    Review of Systems   Constitutional: Negative for activity change, appetite change and unexpected weight change.   Eyes: Negative for visual disturbance.   Respiratory: Negative for shortness of breath.    Cardiovascular: Negative for chest pain, palpitations and leg swelling.   Gastrointestinal: Negative for abdominal pain.   Musculoskeletal: Positive for arthralgias.   Allergic/Immunologic: Positive for environmental allergies.   Neurological: Negative for headaches.   Psychiatric/Behavioral: Positive for confusion. Negative for dysphoric mood and sleep disturbance. The patient is not nervous/anxious.        Objective   /74   Pulse 80   Temp 97.9 °F (36.6 °C)   Ht 160 cm (63\")   Wt 54.4 kg (120 lb)   SpO2 95%   BMI 21.26 kg/m²   Body mass index is " 21.26 kg/m².  Physical Exam   Constitutional: She appears well-developed and well-nourished. No distress.   Pleasant elderly female who appears her stated age, in NAD   HENT:   Head: Normocephalic and atraumatic.   Right Ear: External ear normal.   Left Ear: External ear normal.   Mouth/Throat: Oropharynx is clear and moist.   Eyes: Conjunctivae and EOM are normal. Pupils are equal, round, and reactive to light.   Neck: Normal range of motion. Neck supple. No thyromegaly present.   Cardiovascular: Normal rate, regular rhythm, normal heart sounds and intact distal pulses.   No murmur heard.  No carotid bruits   Pulmonary/Chest: Effort normal and breath sounds normal. No respiratory distress. She has no wheezes.   Abdominal: Soft. Bowel sounds are normal. She exhibits no distension. There is no tenderness.   Musculoskeletal: Normal range of motion. She exhibits no edema.   Lymphadenopathy:     She has no cervical adenopathy.   Neurological: She is alert. No cranial nerve deficit. Coordination normal.   Psychiatric: She has a normal mood and affect. Her behavior is normal. Thought content normal.   She answers questions appropriately today, she has no anxiety or depression today, she is happier that she has been in a long time   Nursing note and vitals reviewed.      Assessment/Plan   Adelia Maurice is here today and the following problems have been addressed:      Adelia was seen today for follow-up.    Diagnoses and all orders for this visit:    Essential hypertension  -     CBC & Differential  -     Comprehensive Metabolic Panel    Current mild episode of major depressive disorder without prior episode (CMS/HCC)    Memory loss    Vitamin D deficiency    Vitamin B 12 deficiency    Other orders  -     amLODIPine (NORVASC) 2.5 MG tablet; Take 1 tablet by mouth Daily.  -     lisinopril (PRINIVIL,ZESTRIL) 10 MG tablet; Take 1 tablet by mouth Daily.  -     donepezil (ARICEPT) 10 MG tablet; Take 2 tablets by mouth Every  Night.  -     citalopram (CeleXA) 20 MG tablet; Take 1 tablet by mouth Daily.        Follow heart healthy/low salt diet  Avoid processed foods  Monitor blood pressure as discussed  Exercise as tolerated   Take all medications as prescribed  Labs as noted  Continue vit D and B12 as directed  Her depression and dementia are doing well now      Return in about 4 months (around 10/4/2019) for Next scheduled follow up.      Marilyn K. Vermeesch, MD      Please note that portions of this note were completed with a voice recognition program.  Efforts were made to edit dictation, but occasionally words are mistranscribed.

## 2019-06-17 ENCOUNTER — TELEPHONE (OUTPATIENT)
Dept: INTERNAL MEDICINE | Facility: CLINIC | Age: 84
End: 2019-06-17

## 2019-06-17 NOTE — TELEPHONE ENCOUNTER
PATIENT NEEDS HANDICAP STICKER THEIRS HAS  AND HAS A DOCTOR APPOINTMENT TOMORROW CAN YOU PLEASE FILL ONE OUT SO SON CAN  TODAY THANKS PLEASE CALL SON WHEN DONE SO HE CAN

## 2019-06-25 ENCOUNTER — APPOINTMENT (OUTPATIENT)
Dept: LAB | Facility: HOSPITAL | Age: 84
End: 2019-06-25

## 2019-06-25 ENCOUNTER — HOSPITAL ENCOUNTER (OUTPATIENT)
Dept: GENERAL RADIOLOGY | Facility: HOSPITAL | Age: 84
Discharge: HOME | End: 2019-06-25

## 2019-06-25 ENCOUNTER — OFFICE VISIT (OUTPATIENT)
Dept: INTERNAL MEDICINE | Facility: CLINIC | Age: 84
End: 2019-06-25

## 2019-06-25 ENCOUNTER — HOSPITAL ENCOUNTER (OUTPATIENT)
Dept: GENERAL RADIOLOGY | Facility: HOSPITAL | Age: 84
Discharge: HOME | End: 2019-06-25
Admitting: FAMILY MEDICINE

## 2019-06-25 VITALS
SYSTOLIC BLOOD PRESSURE: 112 MMHG | TEMPERATURE: 98.1 F | OXYGEN SATURATION: 96 % | DIASTOLIC BLOOD PRESSURE: 72 MMHG | WEIGHT: 117.3 LBS | HEIGHT: 63 IN | BODY MASS INDEX: 20.79 KG/M2 | HEART RATE: 68 BPM

## 2019-06-25 DIAGNOSIS — R10.10 PAIN OF UPPER ABDOMEN: ICD-10-CM

## 2019-06-25 DIAGNOSIS — M54.5 ACUTE LOW BACK PAIN, UNSPECIFIED BACK PAIN LATERALITY, WITH SCIATICA PRESENCE UNSPECIFIED: ICD-10-CM

## 2019-06-25 DIAGNOSIS — R10.9 FLANK PAIN: Primary | ICD-10-CM

## 2019-06-25 DIAGNOSIS — R44.3 HALLUCINATIONS: ICD-10-CM

## 2019-06-25 DIAGNOSIS — R10.9 FLANK PAIN: ICD-10-CM

## 2019-06-25 DIAGNOSIS — R10.10 UPPER ABDOMINAL PAIN: Primary | ICD-10-CM

## 2019-06-25 DIAGNOSIS — M54.40 ACUTE BILATERAL LOW BACK PAIN WITH SCIATICA, SCIATICA LATERALITY UNSPECIFIED: ICD-10-CM

## 2019-06-25 LAB
ALBUMIN SERPL-MCNC: 4.1 G/DL (ref 3.5–5)
ALBUMIN/GLOB SERPL: 1.3 G/DL (ref 1–2)
ALP SERPL-CCNC: 71 U/L (ref 38–126)
ALT SERPL W P-5'-P-CCNC: 17 U/L (ref 13–69)
ANION GAP SERPL CALCULATED.3IONS-SCNC: 13.3 MMOL/L (ref 10–20)
AST SERPL-CCNC: 24 U/L (ref 15–46)
BASOPHILS # BLD AUTO: 0.03 10*3/MM3 (ref 0–0.2)
BASOPHILS NFR BLD AUTO: 0.4 % (ref 0–1.5)
BILIRUB BLD-MCNC: NEGATIVE MG/DL
BILIRUB SERPL-MCNC: 0.3 MG/DL (ref 0.2–1.3)
BUN BLD-MCNC: 25 MG/DL (ref 7–20)
BUN/CREAT SERPL: 27.8 (ref 7.1–23.5)
CALCIUM SPEC-SCNC: 9.2 MG/DL (ref 8.4–10.2)
CHLORIDE SERPL-SCNC: 98 MMOL/L (ref 98–107)
CLARITY, POC: CLEAR
CO2 SERPL-SCNC: 32 MMOL/L (ref 26–30)
COLOR UR: ABNORMAL
CREAT BLD-MCNC: 0.9 MG/DL (ref 0.6–1.3)
DEPRECATED RDW RBC AUTO: 50 FL (ref 37–54)
EOSINOPHIL # BLD AUTO: 0.15 10*3/MM3 (ref 0–0.4)
EOSINOPHIL NFR BLD AUTO: 2 % (ref 0.3–6.2)
ERYTHROCYTE [DISTWIDTH] IN BLOOD BY AUTOMATED COUNT: 14.4 % (ref 12.3–15.4)
GFR SERPL CREATININE-BSD FRML MDRD: 58 ML/MIN/1.73
GLOBULIN UR ELPH-MCNC: 3.2 GM/DL
GLUCOSE BLD-MCNC: 88 MG/DL (ref 74–98)
GLUCOSE UR STRIP-MCNC: NEGATIVE MG/DL
HCT VFR BLD AUTO: 40 % (ref 34–46.6)
HGB BLD-MCNC: 12.9 G/DL (ref 12–15.9)
IMM GRANULOCYTES # BLD AUTO: 0.03 10*3/MM3 (ref 0–0.05)
IMM GRANULOCYTES NFR BLD AUTO: 0.4 % (ref 0–0.5)
KETONES UR QL: ABNORMAL
LEUKOCYTE EST, POC: NEGATIVE
LYMPHOCYTES # BLD AUTO: 2.39 10*3/MM3 (ref 0.7–3.1)
LYMPHOCYTES NFR BLD AUTO: 31.6 % (ref 19.6–45.3)
MCH RBC QN AUTO: 30.6 PG (ref 26.6–33)
MCHC RBC AUTO-ENTMCNC: 32.3 G/DL (ref 31.5–35.7)
MCV RBC AUTO: 95 FL (ref 79–97)
MONOCYTES # BLD AUTO: 0.58 10*3/MM3 (ref 0.1–0.9)
MONOCYTES NFR BLD AUTO: 7.7 % (ref 5–12)
NEUTROPHILS # BLD AUTO: 4.38 10*3/MM3 (ref 1.7–7)
NEUTROPHILS NFR BLD AUTO: 57.9 % (ref 42.7–76)
NITRITE UR-MCNC: NEGATIVE MG/ML
NRBC BLD AUTO-RTO: 0 /100 WBC (ref 0–0.2)
PH UR: 5 [PH] (ref 5–8)
PLATELET # BLD AUTO: 274 10*3/MM3 (ref 140–450)
PMV BLD AUTO: 10.6 FL (ref 6–12)
POTASSIUM BLD-SCNC: 4.3 MMOL/L (ref 3.5–5.1)
PROT SERPL-MCNC: 7.3 G/DL (ref 6.3–8.2)
PROT UR STRIP-MCNC: NEGATIVE MG/DL
RBC # BLD AUTO: 4.21 10*6/MM3 (ref 3.77–5.28)
RBC # UR STRIP: NEGATIVE /UL
SODIUM BLD-SCNC: 139 MMOL/L (ref 137–145)
SP GR UR: 1.03 (ref 1–1.03)
UROBILINOGEN UR QL: ABNORMAL
WBC NRBC COR # BLD: 7.56 10*3/MM3 (ref 3.4–10.8)

## 2019-06-25 PROCEDURE — 85025 COMPLETE CBC W/AUTO DIFF WBC: CPT

## 2019-06-25 PROCEDURE — 81003 URINALYSIS AUTO W/O SCOPE: CPT | Performed by: FAMILY MEDICINE

## 2019-06-25 PROCEDURE — 80053 COMPREHEN METABOLIC PANEL: CPT

## 2019-06-25 PROCEDURE — 99214 OFFICE O/P EST MOD 30 MIN: CPT | Performed by: FAMILY MEDICINE

## 2019-06-25 PROCEDURE — 36415 COLL VENOUS BLD VENIPUNCTURE: CPT

## 2019-06-25 PROCEDURE — 72100 X-RAY EXAM L-S SPINE 2/3 VWS: CPT

## 2019-06-25 PROCEDURE — 74018 RADEX ABDOMEN 1 VIEW: CPT

## 2019-06-25 RX ORDER — ACETAMINOPHEN,DIPHENHYDRAMINE HCL 500; 25 MG/1; MG/1
1 TABLET, FILM COATED ORAL NIGHTLY PRN
COMMUNITY
End: 2019-12-05

## 2019-06-25 NOTE — PROGRESS NOTES
Adelia Maurice is a 89 y.o. female.    Chief Complaint   Patient presents with   • Flank Pain     x 2 days    • Back Pain   • Hallucinations       HPI   Patient reports low back pain that radiates around the front that is been ongoing for 3 days.  She denies any injury.   She reports pain is sore and is sharp with movements. Her son reports she was up last night hallucinating.  He states she was seeing people and animals that aren't really.  He states that she was still having hallucinations when she woke up this morning.  However, throughout the day, she is not had any hallucinations.  She does not report any trouble urinating.  However, her son reports that she does not take in much fluids due to fear of urinary incontinence.  There have been no changes to the sheets medications.  She does take Benadryl and melatonin for sleep.  These medications did not seem to help her get to sleep last night.  She did have laboratory studies done a few weeks ago that were essentially unremarkable.    The following portions of the patient's history were reviewed and updated as appropriate: allergies, current medications, past family history, past medical history, past social history, past surgical history and problem list.     Allergies   Allergen Reactions   • Atorvastatin Other (See Comments)   • Crestor  [Rosuvastatin Calcium] Other (See Comments)   • Other    • Penicillins Other (See Comments)   • Tetanus Toxoid Other (See Comments)   • Zocor  [Simvastatin] Other (See Comments)         Current Outpatient Medications:   •  amLODIPine (NORVASC) 2.5 MG tablet, Take 1 tablet by mouth Daily., Disp: 90 tablet, Rfl: 1  •  aspirin 81 MG tablet, Take 1 tablet by mouth Daily. Take one tab every other day, Disp: 30 tablet, Rfl: 0  •  cholecalciferol (VITAMIN D3) 1000 units tablet, Take 2,000 Units by mouth Daily., Disp: , Rfl:   •  citalopram (CeleXA) 20 MG tablet, Take 1 tablet by mouth Daily., Disp: 90 tablet, Rfl: 1  •   "diphenhydrAMINE HCl (BENADRYL ALLERGY PO), Take  by mouth., Disp: , Rfl:   •  diphenhydrAMINE-acetaminophen (TYLENOL PM)  MG tablet per tablet, Take 1 tablet by mouth At Night As Needed for Sleep., Disp: , Rfl:   •  donepezil (ARICEPT) 10 MG tablet, Take 2 tablets by mouth Every Night., Disp: 180 tablet, Rfl: 1  •  latanoprost (XALATAN) 0.005 % ophthalmic solution, Administer 1 drop to both eyes every night., Disp: , Rfl:   •  lisinopril (PRINIVIL,ZESTRIL) 10 MG tablet, Take 1 tablet by mouth Daily., Disp: 90 tablet, Rfl: 1  •  melatonin 3 MG tablet, Take 3 mg by mouth Every Night., Disp: , Rfl:   •  Multiple Vitamins-Minerals (PRESERVISION AREDS PO), Take  by mouth 2 (Two) Times a Day., Disp: , Rfl:   •  Turmeric Curcumin 500 MG capsule, Take  by mouth 2 (Two) Times a Day., Disp: , Rfl:   •  vitamin B-12 (CYANOCOBALAMIN) 100 MCG tablet, Take 10 mcg by mouth 3 (Three) Times a Week. Takes Monday, Wednesday and friday, Disp: , Rfl:     ROS    Review of Systems   Constitutional: Negative for chills and fever.   Respiratory: Positive for shortness of breath.    Cardiovascular: Negative for chest pain.   Gastrointestinal: Positive for abdominal pain.   Genitourinary: Positive for flank pain.   Musculoskeletal: Positive for arthralgias, back pain and gait problem.   Neurological: Positive for weakness and confusion.   Psychiatric/Behavioral: Positive for hallucinations.       Vitals:    06/25/19 1554   BP: 112/72   BP Location: Left arm   Patient Position: Sitting   Cuff Size: Adult   Pulse: 68   Temp: 98.1 °F (36.7 °C)   TempSrc: Temporal   SpO2: 96%   Weight: 53.2 kg (117 lb 4.8 oz)   Height: 160 cm (63\")     Body mass index is 20.78 kg/m².    Physical Exam     Physical Exam   Constitutional: She is oriented to person, place, and time. She appears well-developed and well-nourished. No distress.   HENT:   Head: Normocephalic and atraumatic.   Right Ear: External ear normal.   Left Ear: External ear normal.   Eyes: " Conjunctivae and EOM are normal.   Cardiovascular: Normal rate and regular rhythm.   No murmur heard.  Pulmonary/Chest: Effort normal and breath sounds normal. No respiratory distress. She has no wheezes.   Abdominal: Soft. Bowel sounds are normal. She exhibits no distension. There is tenderness in the right lower quadrant, suprapubic area and left lower quadrant.   Musculoskeletal:        Lumbar back: She exhibits decreased range of motion and tenderness. She exhibits no swelling.   Neurological: She is alert and oriented to person, place, and time. No cranial nerve deficit.   Skin: Skin is warm and dry.       Assessment/Plan    Problem List Items Addressed This Visit     None      Visit Diagnoses     Flank pain    -  Primary    Relevant Orders    POC Urinalysis Dipstick, Automated (Completed)    XR Spine Lumbar 2 or 3 View    XR Abdomen KUB    Acute low back pain, unspecified back pain laterality, with sciatica presence unspecified        Relevant Orders    POC Urinalysis Dipstick, Automated (Completed)    XR Spine Lumbar 2 or 3 View    XR Abdomen KUB    Pain of upper abdomen        Relevant Orders    XR Spine Lumbar 2 or 3 View    XR Abdomen KUB    Hallucinations        Relevant Orders    XR Spine Lumbar 2 or 3 View    XR Abdomen KUB        Urinalysis was negative for any infection.  She did have ketones present in her urine.  Discussed that the patient may be a little bit dehydrated.  She has been encouraged to increase fluid intake.  Encouraged Gatorade, water, Sprite.  Given the patient's age, it is possible that she may have developed a compression fracture.  Will assess abdominal pain and low back pain with x-rays and check laboratory studies for further evaluation as well.  Patient son has been instructed that he may give her 5 mg of melatonin for sleep rather than 3.  Her son has been encouraged to give her Tylenol as needed for pain control and may purchase lidocaine patches or Aspercreme with lidocaine  over-the-counter to rub on the patient's back.  Discussed with the patient's son that if her condition worsens at all or she develops a fever, he is to take her directly to the emergency room.  He is understanding of this.    No orders of the defined types were placed in this encounter.      No orders of the defined types were placed in this encounter.      No Follow-up on file.    Teresa López,

## 2019-06-25 NOTE — PATIENT INSTRUCTIONS
Salonpas patches or lidocaine patches for pain.  Can also do aspercream with lidocaine for pain relief as well.

## 2019-08-21 ENCOUNTER — OFFICE VISIT (OUTPATIENT)
Dept: INTERNAL MEDICINE | Facility: CLINIC | Age: 84
End: 2019-08-21

## 2019-08-21 VITALS
HEART RATE: 82 BPM | SYSTOLIC BLOOD PRESSURE: 172 MMHG | DIASTOLIC BLOOD PRESSURE: 81 MMHG | OXYGEN SATURATION: 97 % | HEIGHT: 63 IN | TEMPERATURE: 98 F | RESPIRATION RATE: 15 BRPM | WEIGHT: 118 LBS | BODY MASS INDEX: 20.91 KG/M2

## 2019-08-21 DIAGNOSIS — I10 ESSENTIAL HYPERTENSION: ICD-10-CM

## 2019-08-21 DIAGNOSIS — K59.00 CONSTIPATION, UNSPECIFIED CONSTIPATION TYPE: ICD-10-CM

## 2019-08-21 DIAGNOSIS — R30.0 DYSURIA: Primary | ICD-10-CM

## 2019-08-21 DIAGNOSIS — R44.3 HALLUCINATIONS: ICD-10-CM

## 2019-08-21 LAB
BILIRUB BLD-MCNC: NEGATIVE MG/DL
CLARITY, POC: ABNORMAL
COLOR UR: YELLOW
GLUCOSE UR STRIP-MCNC: NEGATIVE MG/DL
KETONES UR QL: NEGATIVE
LEUKOCYTE EST, POC: NEGATIVE
NITRITE UR-MCNC: NEGATIVE MG/ML
PH UR: 5.5 [PH] (ref 5–8)
PROT UR STRIP-MCNC: ABNORMAL MG/DL
RBC # UR STRIP: NEGATIVE /UL
SP GR UR: 1.03 (ref 1–1.03)
UROBILINOGEN UR QL: NORMAL

## 2019-08-21 PROCEDURE — 81003 URINALYSIS AUTO W/O SCOPE: CPT | Performed by: NURSE PRACTITIONER

## 2019-08-21 PROCEDURE — 99214 OFFICE O/P EST MOD 30 MIN: CPT | Performed by: NURSE PRACTITIONER

## 2019-08-21 NOTE — PROGRESS NOTES
Chief Complaint / Reason:      Chief Complaint   Patient presents with   • Hallucinations     backed up 10-20 years and seeing things.        Subjective     HPI  Patient presents today accompanied by his son who states that she has been hallucinating.  He states that in the past she has had a UTI and she does have urinary frequency and states that she has a lot of urinary incontinence that she wears depends.  She denies fever or chills that she is aware of or denies any burning with urination.  Patient does have a history of constipation and was recently seen by Dr. Garcia 6/25/2019 with flank pain back pain and hallucinations.  Dr. López did order a KUB which showed moderate amount of stool.  In regards to the hallucinations son states that his mother has that test 10 to 20 years and is seeing things and people but is not been.  He states there was a few times that she would try to get up and wander throughout the house which is not normally like her and the past few nights she has not had much sleep and neither has he.  She denies chest pain, shortness of breath or heart palpitations.  She is very pleasant and cooperative.  Vital signs are stable with exception of elevated blood pressure.  They are advised to closely monitor at home.  History taken from: patient    PMH/FH/Social History were reviewed and updated appropriately in the electronic medical record.   Past Medical History:   Diagnosis Date   • Dementia    • Dyspnea    • Environmental allergies    • Hypertension    • Macular degeneration 6/23/2016   • Syncope      Past Surgical History:   Procedure Laterality Date   • ADENOIDECTOMY     • APPENDECTOMY     • COLON SURGERY     • COLONOSCOPY     • COLONOSCOPY N/A 10/30/2018    Procedure: COLONOSCOPY, polypectomy;  Surgeon: Harsh Sharif MD;  Location: [facility] ENDOSCOPY;  Service: Gastroenterology   • DENTAL PROCEDURE     • ENDOSCOPY N/A 10/30/2018    Procedure: ESOPHAGOGASTRODUODENOSCOPY;   Surgeon: Harsh Sharif MD;  Location: [facility] ENDOSCOPY;  Service: Gastroenterology   • TONSILLECTOMY       Social History     Socioeconomic History   • Marital status:      Spouse name: Not on file   • Number of children: Not on file   • Years of education: Not on file   • Highest education level: Not on file   Tobacco Use   • Smoking status: Never Smoker   • Smokeless tobacco: Never Used   Substance and Sexual Activity   • Alcohol use: No   • Drug use: No   • Sexual activity: Defer     Family History   Problem Relation Age of Onset   • Cancer Mother    • Diabetes Mother    • Heart attack Father    • Cancer Sister        Review of Systems:   Review of Systems   Constitutional: Positive for fatigue. Negative for chills and fever.   Respiratory: Negative.  Negative for shortness of breath.    Cardiovascular: Negative for chest pain.   Gastrointestinal: Positive for abdominal pain and constipation.   Genitourinary: Positive for frequency and urinary incontinence. Negative for flank pain.   Musculoskeletal: Positive for arthralgias, back pain and gait problem.   Neurological: Positive for weakness and confusion.   Psychiatric/Behavioral: Positive for hallucinations.         All other systems were reviewed and are negative.  Exceptions are noted in the subjective or above.      Objective     Vital Signs  Vitals:    08/21/19 1350   BP: 172/81   Pulse: 82   Resp: 15   Temp: 98 °F (36.7 °C)   SpO2: 97%       Body mass index is 20.9 kg/m².    Physical Exam   Constitutional: She appears well-developed and well-nourished. She is cooperative. No distress.   HENT:   Head: Normocephalic and atraumatic.   Right Ear: External ear normal.   Left Ear: External ear normal.   Eyes: Conjunctivae and EOM are normal.   Cardiovascular: Normal rate, regular rhythm, normal heart sounds and intact distal pulses.   No murmur heard.  Pulmonary/Chest: Effort normal and breath sounds normal. No respiratory distress. She has no  wheezes.   Abdominal: Soft. Bowel sounds are normal. She exhibits distension. There is tenderness in the right lower quadrant, suprapubic area and left lower quadrant.   Musculoskeletal: She exhibits tenderness and deformity.        Lumbar back: She exhibits decreased range of motion and tenderness. She exhibits no swelling.   Neurological: She is alert. No cranial nerve deficit. She exhibits abnormal muscle tone. Coordination and gait abnormal.   Skin: Skin is warm and dry. Capillary refill takes less than 2 seconds.   Psychiatric: She has a normal mood and affect. Her behavior is normal. Cognition and memory are impaired.   Nursing note and vitals reviewed.           Results Review:    I reviewed the patient's new clinical results.   Office Visit on 08/21/2019   Component Date Value Ref Range Status   • Color 08/21/2019 Yellow  Yellow, Straw, Dark Yellow, Lucy Final   • Clarity, UA 08/21/2019 Cloudy* Clear Final   • Specific Gravity  08/21/2019 1.030  1.005 - 1.030 Final   • pH, Urine 08/21/2019 5.5  5.0 - 8.0 Final   • Leukocytes 08/21/2019 Negative  Negative Final   • Nitrite, UA 08/21/2019 Negative  Negative Final   • Protein, POC 08/21/2019 1+* Negative mg/dL Final   • Glucose, UA 08/21/2019 Negative  Negative, 1000 mg/dL (3+) mg/dL Final   • Ketones, UA 08/21/2019 Negative  Negative Final   • Urobilinogen, UA 08/21/2019 Normal  Normal Final   • Bilirubin 08/21/2019 Negative  Negative Final   • Blood, UA 08/21/2019 Negative  Negative Final         Medication Review:     Current Outpatient Medications:   •  amLODIPine (NORVASC) 2.5 MG tablet, Take 1 tablet by mouth Daily., Disp: 90 tablet, Rfl: 1  •  aspirin 81 MG tablet, Take 1 tablet by mouth Daily. Take one tab every other day, Disp: 30 tablet, Rfl: 0  •  cholecalciferol (VITAMIN D3) 1000 units tablet, Take 2,000 Units by mouth Daily., Disp: , Rfl:   •  citalopram (CeleXA) 20 MG tablet, Take 1 tablet by mouth Daily., Disp: 90 tablet, Rfl: 1  •   diphenhydrAMINE-acetaminophen (TYLENOL PM)  MG tablet per tablet, Take 1 tablet by mouth At Night As Needed for Sleep., Disp: , Rfl:   •  donepezil (ARICEPT) 10 MG tablet, Take 2 tablets by mouth Every Night., Disp: 180 tablet, Rfl: 1  •  latanoprost (XALATAN) 0.005 % ophthalmic solution, Administer 1 drop to both eyes every night., Disp: , Rfl:   •  lisinopril (PRINIVIL,ZESTRIL) 10 MG tablet, Take 1 tablet by mouth Daily., Disp: 90 tablet, Rfl: 1  •  melatonin 3 MG tablet, Take 3 mg by mouth Every Night., Disp: , Rfl:   •  Turmeric Curcumin 500 MG capsule, Take  by mouth 2 (Two) Times a Day., Disp: , Rfl:   •  vitamin B-12 (CYANOCOBALAMIN) 100 MCG tablet, Take 10 mcg by mouth 3 (Three) Times a Week. Takes Monday, Wednesday and friday, Disp: , Rfl:   •  Latanoprost 0.005 % emulsion, latanoprost 0.005 % eye drops  INSTILL 1 DROP IN EACH EYE EVERY NIGHT AT BEDTIME, Disp: , Rfl:   •  Multiple Vitamins-Minerals (PRESERVISION AREDS PO), Take  by mouth 2 (Two) Times a Day., Disp: , Rfl:     Assessment/Plan   Adelia was seen today for hallucinations.    Diagnoses and all orders for this visit:    Dysuria  -     POCT urinalysis dipstick, automated  -     Urine Culture - Urine, Urine, Clean Catch  Discussed urine analysis results and advised patient to increase water intake.  Will contact once results of urine culture is available.  Hallucinations  Discussed worsening signs and symptoms with patient and recommend she maintain hydration nutrition and adequate rest  Essential hypertension  Initiate lifestyle modifications.   DASH Diet and exercise.   Compliance with medication regimen and discussed ways to prevent of long-term complications from high blood pressure.  Discussed when to seek medical attention.  Encouraged patient to take blood pressure daily and keep a log.    Constipation, unspecified constipation type  Dietary modifications along with maintaining hydration as patient's BUN is elevated the majority of the  time when labs are done.   discussed [place] stool chart with patient and family.  Reviewed KUB and recommend over-the-counter medication but recommend patient try prune juice first.      Return if symptoms worsen or fail to improve, for Follow up with Dr. Vermeesch as scheduled.    Meenu Barreto, APRN  08/21/2019

## 2019-08-23 LAB
BACTERIA UR CULT: NORMAL
BACTERIA UR CULT: NORMAL

## 2019-08-23 NOTE — PROGRESS NOTES
Please contact patient and let her know that results are negative and just show mixed urogenital vin which are not clinically significant.  Please ask son how she is doing.

## 2019-09-19 ENCOUNTER — APPOINTMENT (OUTPATIENT)
Dept: GENERAL RADIOLOGY | Facility: HOSPITAL | Age: 84
End: 2019-09-19

## 2019-09-19 ENCOUNTER — OFFICE VISIT (OUTPATIENT)
Dept: INTERNAL MEDICINE | Facility: CLINIC | Age: 84
End: 2019-09-19

## 2019-09-19 ENCOUNTER — HOSPITAL ENCOUNTER (EMERGENCY)
Facility: HOSPITAL | Age: 84
Discharge: HOME | End: 2019-09-19
Attending: EMERGENCY MEDICINE | Admitting: EMERGENCY MEDICINE

## 2019-09-19 VITALS
TEMPERATURE: 97.9 F | HEART RATE: 80 BPM | SYSTOLIC BLOOD PRESSURE: 129 MMHG | BODY MASS INDEX: 20.94 KG/M2 | DIASTOLIC BLOOD PRESSURE: 64 MMHG | OXYGEN SATURATION: 91 % | HEIGHT: 63 IN | WEIGHT: 118.2 LBS | RESPIRATION RATE: 16 BRPM

## 2019-09-19 VITALS
HEIGHT: 63 IN | TEMPERATURE: 97.6 F | HEART RATE: 71 BPM | BODY MASS INDEX: 20.55 KG/M2 | WEIGHT: 116 LBS | DIASTOLIC BLOOD PRESSURE: 80 MMHG | OXYGEN SATURATION: 94 % | SYSTOLIC BLOOD PRESSURE: 168 MMHG

## 2019-09-19 DIAGNOSIS — R05.9 COUGH: Primary | ICD-10-CM

## 2019-09-19 DIAGNOSIS — J18.9 PNEUMONIA OF RIGHT LUNG DUE TO INFECTIOUS ORGANISM, UNSPECIFIED PART OF LUNG: Primary | ICD-10-CM

## 2019-09-19 LAB
ALBUMIN SERPL-MCNC: 4.1 G/DL (ref 3.5–5.2)
ALBUMIN/GLOB SERPL: 1.3 G/DL
ALP SERPL-CCNC: 62 U/L (ref 39–117)
ALT SERPL W P-5'-P-CCNC: 5 U/L (ref 1–33)
ANION GAP SERPL CALCULATED.3IONS-SCNC: 16.1 MMOL/L (ref 5–15)
AST SERPL-CCNC: 15 U/L (ref 1–32)
BASOPHILS # BLD AUTO: 0.03 10*3/MM3 (ref 0–0.2)
BASOPHILS NFR BLD AUTO: 0.4 % (ref 0–1.5)
BILIRUB SERPL-MCNC: 0.3 MG/DL (ref 0.2–1.2)
BUN BLD-MCNC: 20 MG/DL (ref 8–23)
BUN/CREAT SERPL: 25 (ref 7–25)
CALCIUM SPEC-SCNC: 9.2 MG/DL (ref 8.2–9.6)
CHLORIDE SERPL-SCNC: 98 MMOL/L (ref 98–107)
CO2 SERPL-SCNC: 25.9 MMOL/L (ref 22–29)
CREAT BLD-MCNC: 0.8 MG/DL (ref 0.57–1)
DEPRECATED RDW RBC AUTO: 52.2 FL (ref 37–54)
EOSINOPHIL # BLD AUTO: 0.11 10*3/MM3 (ref 0–0.4)
EOSINOPHIL NFR BLD AUTO: 1.5 % (ref 0.3–6.2)
ERYTHROCYTE [DISTWIDTH] IN BLOOD BY AUTOMATED COUNT: 14.2 % (ref 12.3–15.4)
FLUAV AG NPH QL: NEGATIVE
FLUBV AG NPH QL IA: NEGATIVE
GFR SERPL CREATININE-BSD FRML MDRD: 66 ML/MIN/1.73
GLOBULIN UR ELPH-MCNC: 3.1 GM/DL
GLUCOSE BLD-MCNC: 231 MG/DL (ref 65–99)
HCT VFR BLD AUTO: 40 % (ref 34–46.6)
HGB BLD-MCNC: 12.6 G/DL (ref 12–15.9)
IMM GRANULOCYTES # BLD AUTO: 0.03 10*3/MM3 (ref 0–0.05)
IMM GRANULOCYTES NFR BLD AUTO: 0.4 % (ref 0–0.5)
LYMPHOCYTES # BLD AUTO: 1.83 10*3/MM3 (ref 0.7–3.1)
LYMPHOCYTES NFR BLD AUTO: 24.3 % (ref 19.6–45.3)
MCH RBC QN AUTO: 31 PG (ref 26.6–33)
MCHC RBC AUTO-ENTMCNC: 31.5 G/DL (ref 31.5–35.7)
MCV RBC AUTO: 98.3 FL (ref 79–97)
MONOCYTES # BLD AUTO: 0.37 10*3/MM3 (ref 0.1–0.9)
MONOCYTES NFR BLD AUTO: 4.9 % (ref 5–12)
NEUTROPHILS # BLD AUTO: 5.16 10*3/MM3 (ref 1.7–7)
NEUTROPHILS NFR BLD AUTO: 68.5 % (ref 42.7–76)
NRBC BLD AUTO-RTO: 0 /100 WBC (ref 0–0.2)
PLATELET # BLD AUTO: 274 10*3/MM3 (ref 140–450)
PMV BLD AUTO: 10.1 FL (ref 6–12)
POTASSIUM BLD-SCNC: 4.1 MMOL/L (ref 3.5–5.2)
PROCALCITONIN SERPL-MCNC: 0.02 NG/ML (ref 0.1–0.25)
PROT SERPL-MCNC: 7.2 G/DL (ref 6–8.5)
RBC # BLD AUTO: 4.07 10*6/MM3 (ref 3.77–5.28)
SODIUM BLD-SCNC: 140 MMOL/L (ref 136–145)
WBC NRBC COR # BLD: 7.53 10*3/MM3 (ref 3.4–10.8)

## 2019-09-19 PROCEDURE — 96372 THER/PROPH/DIAG INJ SC/IM: CPT | Performed by: INTERNAL MEDICINE

## 2019-09-19 PROCEDURE — 99284 EMERGENCY DEPT VISIT MOD MDM: CPT

## 2019-09-19 PROCEDURE — 99214 OFFICE O/P EST MOD 30 MIN: CPT | Performed by: INTERNAL MEDICINE

## 2019-09-19 PROCEDURE — 94799 UNLISTED PULMONARY SVC/PX: CPT

## 2019-09-19 PROCEDURE — 87804 INFLUENZA ASSAY W/OPTIC: CPT | Performed by: EMERGENCY MEDICINE

## 2019-09-19 PROCEDURE — 80053 COMPREHEN METABOLIC PANEL: CPT | Performed by: EMERGENCY MEDICINE

## 2019-09-19 PROCEDURE — 84145 PROCALCITONIN (PCT): CPT | Performed by: EMERGENCY MEDICINE

## 2019-09-19 PROCEDURE — 94640 AIRWAY INHALATION TREATMENT: CPT

## 2019-09-19 PROCEDURE — 71046 X-RAY EXAM CHEST 2 VIEWS: CPT

## 2019-09-19 PROCEDURE — 25010000002 DEXAMETHASONE PER 1 MG: Performed by: EMERGENCY MEDICINE

## 2019-09-19 PROCEDURE — 85025 COMPLETE CBC W/AUTO DIFF WBC: CPT | Performed by: EMERGENCY MEDICINE

## 2019-09-19 PROCEDURE — 96374 THER/PROPH/DIAG INJ IV PUSH: CPT

## 2019-09-19 RX ORDER — ALBUTEROL SULFATE 90 UG/1
2 AEROSOL, METERED RESPIRATORY (INHALATION) EVERY 4 HOURS PRN
Qty: 1 INHALER | Refills: 0 | Status: SHIPPED | OUTPATIENT
Start: 2019-09-19 | End: 2022-08-25

## 2019-09-19 RX ORDER — IPRATROPIUM BROMIDE AND ALBUTEROL SULFATE 2.5; .5 MG/3ML; MG/3ML
3 SOLUTION RESPIRATORY (INHALATION) ONCE
Status: COMPLETED | OUTPATIENT
Start: 2019-09-19 | End: 2019-09-19

## 2019-09-19 RX ORDER — AZITHROMYCIN 250 MG/1
TABLET, FILM COATED ORAL
Qty: 6 TABLET | Refills: 0 | Status: SHIPPED | OUTPATIENT
Start: 2019-09-19 | End: 2019-09-19 | Stop reason: SDUPTHER

## 2019-09-19 RX ORDER — AZITHROMYCIN 250 MG/1
TABLET, FILM COATED ORAL
Qty: 6 TABLET | Refills: 0 | Status: SHIPPED | OUTPATIENT
Start: 2019-09-19 | End: 2019-09-20

## 2019-09-19 RX ORDER — CEFTRIAXONE 1 G/1
1 INJECTION, POWDER, FOR SOLUTION INTRAMUSCULAR; INTRAVENOUS ONCE
Status: COMPLETED | OUTPATIENT
Start: 2019-09-19 | End: 2019-09-19

## 2019-09-19 RX ORDER — DEXAMETHASONE SODIUM PHOSPHATE 10 MG/ML
10 INJECTION INTRAMUSCULAR; INTRAVENOUS ONCE
Status: COMPLETED | OUTPATIENT
Start: 2019-09-19 | End: 2019-09-19

## 2019-09-19 RX ORDER — CEFUROXIME AXETIL 500 MG/1
500 TABLET ORAL 2 TIMES DAILY
Qty: 14 TABLET | Refills: 0 | Status: SHIPPED | OUTPATIENT
Start: 2019-09-19 | End: 2019-09-23

## 2019-09-19 RX ADMIN — CEFTRIAXONE 1 G: 1 INJECTION, POWDER, FOR SOLUTION INTRAMUSCULAR; INTRAVENOUS at 10:24

## 2019-09-19 RX ADMIN — IPRATROPIUM BROMIDE AND ALBUTEROL SULFATE 3 ML: .5; 3 SOLUTION RESPIRATORY (INHALATION) at 12:44

## 2019-09-19 RX ADMIN — IPRATROPIUM BROMIDE AND ALBUTEROL SULFATE 3 ML: .5; 3 SOLUTION RESPIRATORY (INHALATION) at 11:48

## 2019-09-19 RX ADMIN — SODIUM CHLORIDE 500 ML: 9 INJECTION, SOLUTION INTRAVENOUS at 11:33

## 2019-09-19 RX ADMIN — DEXAMETHASONE SODIUM PHOSPHATE 10 MG: 10 INJECTION INTRAMUSCULAR; INTRAVENOUS at 11:36

## 2019-09-19 NOTE — PROGRESS NOTES
"Chief Complaint   Patient presents with   • Abstract     Son states Pt's been congested, coughing, and rattling in chest. Sone states sxs started last week and have gotten worse. Pt has been taking Coricidin HBP     Subjective   Adelia Maurice is a 89 y.o. female.     Several family members have had same illness.  She has yellow sputum production.  Has not had a good appetite.        Cough   This is a new problem. The current episode started in the past 7 days. The problem has been waxing and waning. The problem occurs every few minutes. The cough is productive of sputum. Associated symptoms include a fever, nasal congestion, postnasal drip and shortness of breath. Pertinent negatives include no chills, ear pain, headaches or sore throat. Nothing aggravates the symptoms. Treatments tried: coricidin for cough and congestion. The treatment provided mild relief. There is no history of asthma, COPD or environmental allergies.        The following portions of the patient's history were reviewed and updated as appropriate: allergies, current medications, past family history, past medical history, past social history, past surgical history and problem list.    Review of Systems   Constitutional: Positive for fever. Negative for chills.   HENT: Positive for postnasal drip. Negative for ear pain and sore throat.    Respiratory: Positive for cough and shortness of breath.    Allergic/Immunologic: Negative for environmental allergies.   Neurological: Negative for headaches.       Objective   /80   Pulse 71   Temp 97.6 °F (36.4 °C)   Ht 160 cm (63\")   Wt 52.6 kg (116 lb)   SpO2 94%   BMI 20.55 kg/m²   Body mass index is 20.55 kg/m².  Physical Exam   Constitutional: She is oriented to person, place, and time. She appears well-developed and well-nourished. No distress.   Pleasant elderly female, she appears pale and tired today, she has a loose cough noted throughout our interview   HENT:   Head: Normocephalic and " atraumatic.   Right Ear: External ear normal.   Left Ear: External ear normal.   Mouth/Throat: No oropharyngeal exudate.   Tympanic membranes normal, oropharynx clear, mucous membranes slightly dry   Eyes: Conjunctivae and EOM are normal. Pupils are equal, round, and reactive to light.   Neck: Normal range of motion. Neck supple. No thyromegaly present.   Cardiovascular: Normal rate, regular rhythm and intact distal pulses.   Murmur heard.  Systolic murmur grade 1/6 heard over precordium   Pulmonary/Chest: Effort normal. No respiratory distress. She has no wheezes.   Significant rhonchi heard over lower and mid right lung field, few rhonchi noted over left lung field   Abdominal: Soft. Bowel sounds are normal. She exhibits no distension. There is no tenderness.   Musculoskeletal: Normal range of motion. She exhibits no edema.   Lymphadenopathy:     She has no cervical adenopathy.   Neurological: She is alert and oriented to person, place, and time. No cranial nerve deficit. Coordination normal.   Patient ambulates with use of a cane   Psychiatric: Her behavior is normal. Judgment and thought content normal.   Flat affect noted today   Nursing note and vitals reviewed.      Assessment/Plan   Adelia Maurice is here today and the following problems have been addressed:      Adelia was seen today for abstract.    Diagnoses and all orders for this visit:    Pneumonia of right lung due to infectious organism, unspecified part of lung  -     CBC & Differential  -     Comprehensive Metabolic Panel  -     XR Chest 2 View; Future    Other orders  -     azithromycin (ZITHROMAX) 250 MG tablet; Take 2 tablets the first day, then 1 tablet daily for 4 days.  -     cefuroxime (CEFTIN) 500 MG tablet; Take 1 tablet by mouth 2 (Two) Times a Day.    Labs and CXR today  Given rocephin 1 g IM today  Given Zpak to start today and ceftin 500 mg BID start tomorrow  Recommend mucinex twice a day  Increase fluids and rest  Go to ER for poor  food/fluid intake, worsening cough/SOA, fevers/chills-son is considering taking patient to ER today after our discussion    RTC one week    Please note that portions of this note were completed with a voice recognition program.  Efforts were made to edit dictation, but occasionally words are mistranscribed.

## 2019-09-19 NOTE — ED PROVIDER NOTES
Subjective   89-year-old female presenting with cough.  Patient has dementia and cannot provide much in the way of history.  History obtained from son.  For the last week or so she has had initially some congestion and sore throat.  This is progressed to a productive cough.  She denies any shortness of breath, vomiting, fevers or other complaints.  Son is noted no other issues.  She was seen by her primary doctor today and told that she was dehydrated and may have pneumonia.  She was sent here for labs and a chest x-ray.  Her primary doctor did treat her with a dose of Rocephin and wrote prescriptions for antibiotics.            Review of Systems   Unable to perform ROS: Dementia       Past Medical History:   Diagnosis Date   • Dementia    • Dyspnea    • Environmental allergies    • Hypertension    • Macular degeneration 6/23/2016   • Syncope        Allergies   Allergen Reactions   • Atorvastatin Other (See Comments)   • Crestor  [Rosuvastatin Calcium] Other (See Comments)   • Other    • Penicillins Other (See Comments)   • Tetanus Toxoid Other (See Comments)   • Zocor  [Simvastatin] Other (See Comments)       Past Surgical History:   Procedure Laterality Date   • ADENOIDECTOMY     • APPENDECTOMY     • COLON SURGERY     • COLONOSCOPY     • COLONOSCOPY N/A 10/30/2018    Procedure: COLONOSCOPY, polypectomy;  Surgeon: Harsh Sharif MD;  Location: [facility] ENDOSCOPY;  Service: Gastroenterology   • DENTAL PROCEDURE     • ENDOSCOPY N/A 10/30/2018    Procedure: ESOPHAGOGASTRODUODENOSCOPY;  Surgeon: Harsh Sharif MD;  Location: [facility] ENDOSCOPY;  Service: Gastroenterology   • TONSILLECTOMY         Family History   Problem Relation Age of Onset   • Cancer Mother    • Diabetes Mother    • Heart attack Father    • Cancer Sister        Social History     Socioeconomic History   • Marital status:      Spouse name: Not on file   • Number of children: Not on file   • Years of education: Not on file   • Highest  education level: Not on file   Tobacco Use   • Smoking status: Never Smoker   • Smokeless tobacco: Never Used   Substance and Sexual Activity   • Alcohol use: No   • Drug use: No   • Sexual activity: Defer           Objective   Physical Exam   Constitutional: She is oriented to person, place, and time. She appears well-developed and well-nourished. No distress.   HENT:   Head: Normocephalic and atraumatic.   Right Ear: External ear normal.   Left Ear: External ear normal.   Nose: Nose normal.   Mouth/Throat: Oropharynx is clear and moist.   Eyes: Conjunctivae and EOM are normal. Pupils are equal, round, and reactive to light.   Neck: Normal range of motion. Neck supple.   Cardiovascular: Normal rate, regular rhythm, normal heart sounds and intact distal pulses.   Pulmonary/Chest: Effort normal. No respiratory distress.   Coarse wheezes throughout, normal effort   Abdominal: Soft. Bowel sounds are normal. She exhibits no distension. There is no tenderness. There is no rebound and no guarding.   Musculoskeletal: Normal range of motion. She exhibits no edema, tenderness or deformity.   Neurological: She is alert and oriented to person, place, and time.   Skin: Skin is warm and dry. No rash noted.   Psychiatric: She has a normal mood and affect. Her behavior is normal.   Nursing note and vitals reviewed.      Procedures           ED Course                  MDM  Number of Diagnoses or Management Options  Cough:   Diagnosis management comments: 89 year old female with cough. Well developed, well nourished elderly lady in no distress with exam as above.  She does have coarse diffuse wheezes.  Will check labs and chest x-ray as requested.  We will give symptomatic treatment.  Disposition pending work-up and response to therapy.    DDX: Bronchitis, viral illness, pneumonia    Chest x-ray per radiology reveals no acute abnormality.  Lab work is largely unremarkable.  She feels better after treatment, lungs have cleared up  quite a bit, she still has an occasional scattered wheeze.  She would like to go home.  I think that is a reasonable plan.  She has good help at home, has already been given appropriate prescriptions by her primary doctor, we discussed strict return precautions.  Patient son is comfortable with an understanding of the plan.       Amount and/or Complexity of Data Reviewed  Clinical lab tests: reviewed  Tests in the radiology section of CPT®: reviewed        Final diagnoses:   Cough              Lino Paris MD  09/19/19 [id number]

## 2019-09-20 ENCOUNTER — TELEPHONE (OUTPATIENT)
Dept: INTERNAL MEDICINE | Facility: CLINIC | Age: 84
End: 2019-09-20

## 2019-09-20 ENCOUNTER — EPISODE CHANGES (OUTPATIENT)
Dept: CASE MANAGEMENT | Facility: OTHER | Age: 84
End: 2019-09-20

## 2019-09-20 RX ORDER — DOXYCYCLINE HYCLATE 100 MG/1
100 TABLET, DELAYED RELEASE ORAL 2 TIMES DAILY
Qty: 14 TABLET | Refills: 0 | Status: SHIPPED | OUTPATIENT
Start: 2019-09-20 | End: 2019-09-23

## 2019-09-20 NOTE — TELEPHONE ENCOUNTER
Lonnei at [facility] calling about a drug interaction with zpak and some other of her meds. Does she want it filled. Donepezil and citalopram  With a zpak cause some heart rhythm issues. Please let [facility] know if she does want her to be on a zpak.

## 2019-09-23 ENCOUNTER — PATIENT OUTREACH (OUTPATIENT)
Dept: CASE MANAGEMENT | Facility: OTHER | Age: 84
End: 2019-09-23

## 2019-09-23 ENCOUNTER — TELEPHONE (OUTPATIENT)
Dept: INTERNAL MEDICINE | Facility: CLINIC | Age: 84
End: 2019-09-23

## 2019-09-23 NOTE — OUTREACH NOTE
Care Coordination Note    Called Dr. M. Vermeesch's office, spoke with Tiki Bishop MA, regarding Friday, 9-20-19, antibiotic change. Tiki said Dr. Vermeesch's order to change the antibiotic was sent to patient's pharmacy; then she called the patient's main phone number, was unable to leave voicemail so she called the second number for patient and left a detailed voicemail for the family regarding the change in antibiotic.       Marina Lee RN  Community Care Coordinator    9/23/2019, 5:02 PM

## 2019-09-23 NOTE — TELEPHONE ENCOUNTER
Son advised when I spoke with him earlier that if there was any issues I would call him. Advised him if he didn't hear from me, it was fine.

## 2019-09-23 NOTE — OUTREACH NOTE
Care Plan Note      Responses   Lifestyle Goals  Routine follow-up with doctor(s)   Barriers  Disease education   Self Management  Medication Adherence   Annual Wellness Visit:   Patient Will Schedule [Son will schedule]   Care Gaps Addressed  Flu Shot, Pneumonia Vaccine   Flu Shot Status  Patient will Complete   Pneumonia Vaccine Status  Patient will Complete   Does patient have depression diagnosis?  Yes   Advanced Directives:  Patient Has   Ed Visits past 12 months:  1   Hospitalizations past 12 months  1   Medication Adherence  Medications understood [Medications understood by family]        The main concerns and/or symptoms the patient would like to address are: Called patient, son Denny answered, regarding patient's recent ED visit 9-19-19 with cough.  Son stated patient is doing a lot better.  Patient is using the Inhaler on prn basis; is still taking the original  antibiotics that were ordered on 9/19/19. Patient lives with this sonDenny, and he assists as needed; drives her to appts; assists with medications.     Education/instruction provided by Care Coordinator:   Explained role of Care Advisor and contact information given to patient.  CA reviewed with son, new prescription medications, PCP appt. and ED recommendations.  CA advised son to contact patient's pharmacy regarding medication questions. He voiced understanding and agreement to contact them today.  Reviewed Gaps in Care (Flu and Pneumonia vaccines) and need to schedule Annual Wellness Visit.  Son said he would discuss with PCP this week and schedule as needed.  Advanced Directives are on file.      Follow Up Outreach Due:  Next week or as needed.    Marina Lee RN  Community Care Coordinator    9/23/2019, 3:15 PM

## 2019-09-23 NOTE — TELEPHONE ENCOUNTER
Talked twice with this nurse. The son never got my message. I couldn't leave a message on home and I knew this needed to get done. I left on work for mom to stop zpak and get doxy at [facility]. Dr CLARK had ask me to look at Singulex box at [phone] on Friday. I tried to get in contact with someone and left a message on a vm that said gabriela chong. Said I am downstairs working just leave a message. Now son would like a call back.

## 2019-09-23 NOTE — OUTREACH NOTE
Patient Outreach Note    Called the son, Denny. Explained that Tiki Bishop MA at Dr. Vermeesch's office, said the new antibiotic order was sent to patient's pharmacy, and she called the first number listed for patient but could not leave voicemail on Friday (9/20).  She called the second number listed, and did leave a detailed voicemail regarding the change in antibiotics.  Son stated they never got the message, as the second number listed for patient is a business line, and they have not been in the business to retrieve messages. Son questioned what he should do about the medicine at this point.  CA advised him to call the PCP office and get MD recommendation.  Son stated he was driving and would call when he got home.  Due to time, CA offered to call PCP office and have the MA at PCP office call him; son agreed.     Marina Lee RN  Community Care Coordinator    9/23/2019, 5:07 PM       Pt advised her urine is okay.  I called mom and advised the urine was okay with no infection but she still had protein 1+ in urine.  I advised mom she needed to get pt apt with her pcp for follow up on proteinuria.  I advised mom sometimes children can spill protein when they are active which is normal but she needs a split urine test for protein both resting and awake to see if the protein is being spilled at rest which could mean she has underlying kidney problem which would need to be further evaluated by more testing.  Mom states she will make her an apt with pcp.  Follow up as needed

## 2019-09-23 NOTE — TELEPHONE ENCOUNTER
Spoke with Pt's son, states they didn't receive the VM that was left on Friday and Pt took the zpak. Son states Pt seems to be fine and no hear concerns. Advised son I would let you know.

## 2019-09-23 NOTE — OUTREACH NOTE
Care Coordination Note    Called Dr. Vermeesch's office, spoke with Tiki Bishop MA.  Explained to Tiki, that the son never got the message she left on phone from Friday, as it was a business line and they had not been back in the business to retrieve messages.  Explained to Tiki that the son is wondering what he should do now about the new antibiotic order, as patient is at end of the original antibiotic.  CA asked Tiki if they could call the son, Denny, with MD recommendation on antibiotic use today.  Tiki said they would follow up with and call the son today.      Marina Lee RN  Community Care Coordinator    9/23/2019, 5:16 PM

## 2019-09-23 NOTE — OUTREACH NOTE
Patient Outreach Note    Called patient's son, Denny, to follow up.  Denny said he just got off the phone with MARK Pacheco from Dr. Vermeesch's office.  He said since patient is finished with the original antibiotic, he was advised to not  the newest antibiotic.  Dr. Vermeesch will see patient in her office this week on Thursday and reassess.  No other questions or concerns voiced at this time.    Marina Lee RN  Community Care Coordinator    9/23/2019, 5:24 PM

## 2019-09-26 ENCOUNTER — OFFICE VISIT (OUTPATIENT)
Dept: INTERNAL MEDICINE | Facility: CLINIC | Age: 84
End: 2019-09-26

## 2019-09-26 VITALS
BODY MASS INDEX: 20.91 KG/M2 | SYSTOLIC BLOOD PRESSURE: 120 MMHG | DIASTOLIC BLOOD PRESSURE: 68 MMHG | HEART RATE: 68 BPM | TEMPERATURE: 97.8 F | WEIGHT: 118 LBS | OXYGEN SATURATION: 96 % | HEIGHT: 63 IN

## 2019-09-26 DIAGNOSIS — J18.9 PNEUMONIA OF RIGHT LUNG DUE TO INFECTIOUS ORGANISM, UNSPECIFIED PART OF LUNG: Primary | ICD-10-CM

## 2019-09-26 PROCEDURE — 99213 OFFICE O/P EST LOW 20 MIN: CPT | Performed by: INTERNAL MEDICINE

## 2019-09-26 NOTE — PROGRESS NOTES
"Chief Complaint   Patient presents with   • Follow-up     Pt went to [facility] ER due to Bronchitis, viral illness, pneumonia     Subjective   Adelia Maurice is a 89 y.o. female.     Patient is here today for follow-up of bronchitis/pneumonia.  She was seen 1 week ago with complaints of cough and weakness.  She was given 1 dose of Rocephin 1 g IM here in clinic and discharged home with a Z-Daniel and Ceftin.  She went to the emergency room after her clinic visit where chest x-ray was unremarkable and complete blood count was essentially normal.  She was given a nebulizer treatment which helped improve her cough and symptoms and requested to be discharged home.  She has completed the antibiotic.  Her O2 sats are better today.  She is eating a little bit better.  She is sleeping very well.    No NVD.  She has a dry and only intermittent cough.           The following portions of the patient's history were reviewed and updated as appropriate: allergies, current medications, past family history, past medical history, past social history, past surgical history and problem list.    Review of Systems   Constitutional: Positive for fatigue. Negative for activity change, appetite change, chills and fever.   HENT: Negative.    Respiratory: Positive for cough. Negative for shortness of breath and wheezing.    Cardiovascular: Negative.    Gastrointestinal: Negative.    Neurological: Positive for weakness.       Objective   /68   Pulse 68   Temp 97.8 °F (36.6 °C)   Ht 160 cm (63\")   Wt 53.5 kg (118 lb)   SpO2 96%   BMI 20.90 kg/m²   Body mass index is 20.9 kg/m².  Physical Exam   Constitutional: She is oriented to person, place, and time. She appears well-developed and well-nourished. No distress.   Very pleasant elderly female who appears her stated age, she appears tired today   HENT:   Head: Normocephalic and atraumatic.   Mouth/Throat: Oropharynx is clear and moist.   Cardiovascular: Normal rate, regular rhythm and " intact distal pulses.   Multiple ectopic beats noted, systolic murmur grade 1/6 heard over precordium   Pulmonary/Chest: Effort normal and breath sounds normal. No respiratory distress. She has no wheezes. She has no rales.   Musculoskeletal:   Patient ambulates with use of a cane   Neurological: She is alert and oriented to person, place, and time.   Psychiatric: Her behavior is normal. Judgment and thought content normal.   Flat affect noted, patient answers questions appropriately but appears quite tired today, also appears somewhat depressed   Nursing note and vitals reviewed.      Assessment/Plan   Adelia Maurice is here today and the following problems have been addressed:      Adelia was seen today for follow-up.    Diagnoses and all orders for this visit:    Pneumonia of right lung due to infectious organism, unspecified part of lung    Patient is completing last dose of Ceftin today, has completed her Z-Daniel  Encouraged her to continue good oral intake including fluids and variety of foods  No symptoms of pneumonia or bronchitis on exam today    Return to clinic in 2 months for routine follow-up visit or as needed if symptoms of infection recur    Please note that portions of this note were completed with a voice recognition program.  Efforts were made to edit dictation, but occasionally words are mistranscribed.

## 2019-11-18 ENCOUNTER — OFFICE VISIT (OUTPATIENT)
Dept: CARDIOLOGY | Facility: CLINIC | Age: 84
End: 2019-11-18

## 2019-11-18 ENCOUNTER — FLU SHOT (OUTPATIENT)
Dept: INTERNAL MEDICINE | Facility: CLINIC | Age: 84
End: 2019-11-18

## 2019-11-18 VITALS
DIASTOLIC BLOOD PRESSURE: 76 MMHG | WEIGHT: 115 LBS | SYSTOLIC BLOOD PRESSURE: 120 MMHG | BODY MASS INDEX: 22.58 KG/M2 | HEART RATE: 79 BPM | OXYGEN SATURATION: 97 % | HEIGHT: 60 IN

## 2019-11-18 DIAGNOSIS — Z23 NEED FOR INFLUENZA VACCINATION: Primary | ICD-10-CM

## 2019-11-18 DIAGNOSIS — I10 ESSENTIAL HYPERTENSION: ICD-10-CM

## 2019-11-18 DIAGNOSIS — R06.09 DOE (DYSPNEA ON EXERTION): Primary | ICD-10-CM

## 2019-11-18 DIAGNOSIS — Z23 NEED FOR PNEUMOCOCCAL VACCINE: ICD-10-CM

## 2019-11-18 DIAGNOSIS — R55 SYNCOPE AND COLLAPSE: ICD-10-CM

## 2019-11-18 PROCEDURE — G0008 ADMIN INFLUENZA VIRUS VAC: HCPCS | Performed by: INTERNAL MEDICINE

## 2019-11-18 PROCEDURE — G0009 ADMIN PNEUMOCOCCAL VACCINE: HCPCS | Performed by: INTERNAL MEDICINE

## 2019-11-18 PROCEDURE — 90670 PCV13 VACCINE IM: CPT | Performed by: INTERNAL MEDICINE

## 2019-11-18 PROCEDURE — 99213 OFFICE O/P EST LOW 20 MIN: CPT | Performed by: INTERNAL MEDICINE

## 2019-11-18 PROCEDURE — 90653 IIV ADJUVANT VACCINE IM: CPT | Performed by: INTERNAL MEDICINE

## 2019-11-18 NOTE — PROGRESS NOTES
[facility]  Office Progress Note  Adelia Maurice  4/29/1922      Visit Date: 11/18/19    PCP: Vermeesch, Marilyn K, MD  [address] [place] [id number]    IDENTIFICATION: A 89 y.o. 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    PROBLEM LIST:  1. Dyspnea:  1. June 2011: D-dimer 1700 with negative CTA for pulmonary embolus.   2. Syncope and collapse:  1. Holter, June 2011 with occasional PAC/PVC, no tachy or bradyarrhythmias to account of syncopal episode.  2. Carotid duplex 12/15: Bilateral mild-mod. Plaque without hemodynamically significant stenosis.  3. Syncope and fall 2/11/17, [facility]  3. Hypertension, presumed essential.  4. Unknown lipid status.  5. Remote valve surgery, 1996 and 2007.  6. GI bleed 10/2018 [facility] hgb 8.7 upper/lower scope tics only.      Chief Complaint   Patient presents with   • Hypertension       Allergies  Allergies   Allergen Reactions   • Atorvastatin Other (See Comments)   • Crestor  [Rosuvastatin Calcium] Other (See Comments)   • Other    • Penicillins Other (See Comments)   • Tetanus Toxoid Other (See Comments)   • Zocor  [Simvastatin] Other (See Comments)       Current Medications    Current Outpatient Medications:   •  albuterol sulfate  (90 Base) MCG/ACT inhaler, Inhale 2 puffs Every 4 (Four) Hours As Needed for Wheezing or Shortness of Air., Disp: 1 inhaler, Rfl: 0  •  amLODIPine (NORVASC) 2.5 MG tablet, Take 1 tablet by mouth Daily., Disp: 90 tablet, Rfl: 1  •  aspirin 81 MG tablet, Take 1 tablet by mouth Daily. Take one tab every other day, Disp: 30 tablet, Rfl: 0  •  cholecalciferol (VITAMIN D3) 1000 units tablet, Take 2,000 Units by mouth Daily., Disp: , Rfl:   •  citalopram (CeleXA) 20 MG tablet, Take 1 tablet by mouth Daily., Disp: 90 tablet, Rfl: 1  •  diphenhydrAMINE-acetaminophen (TYLENOL PM)  MG tablet per tablet, Take 1 tablet by mouth At Night As  "Needed for Sleep., Disp: , Rfl:   •  donepezil (ARICEPT) 10 MG tablet, Take 2 tablets by mouth Every Night., Disp: 180 tablet, Rfl: 1  •  latanoprost (XALATAN) 0.005 % ophthalmic solution, Administer 1 drop to both eyes every night., Disp: , Rfl:   •  Latanoprost 0.005 % emulsion, latanoprost 0.005 % eye drops  INSTILL 1 DROP IN EACH EYE EVERY NIGHT AT BEDTIME, Disp: , Rfl:   •  lisinopril (PRINIVIL,ZESTRIL) 10 MG tablet, Take 1 tablet by mouth Daily., Disp: 90 tablet, Rfl: 1  •  melatonin 3 MG tablet, Take 3 mg by mouth Every Night., Disp: , Rfl:   •  Multiple Vitamins-Minerals (PRESERVISION AREDS PO), Take  by mouth 2 (Two) Times a Day., Disp: , Rfl:   •  Turmeric Curcumin 500 MG capsule, Take  by mouth 2 (Two) Times a Day., Disp: , Rfl:   •  vitamin B-12 (CYANOCOBALAMIN) 100 MCG tablet, Take 10 mcg by mouth 3 (Three) Times a Week. Takes Monday, Wednesday and friday, Disp: , Rfl:       History of Present Illness   Adelia Maurice is a 89 y.o. year old female here for follow up.    Pt denies any chest pain, dyspnea, dyspnea on exertion, orthopnea, PND, palpitations, lower extremity edema, or claudication.  Recent GI illness and continued upper respiratory congestion    OBJECTIVE:  Vitals:    11/18/19 1046   BP: 120/76   BP Location: Right arm   Patient Position: Sitting   Pulse: 79   SpO2: 97%   Weight: 52.2 kg (115 lb)   Height: 152.4 cm (60\")     Physical Exam   Constitutional: She appears well-developed and well-nourished.   Neck: Normal range of motion. Neck supple. No hepatojugular reflux and no JVD present. Carotid bruit is not present. No tracheal deviation present. No thyromegaly present.   Cardiovascular: Normal rate, regular rhythm, S1 normal, S2 normal, intact distal pulses and normal pulses. PMI is not displaced. Exam reveals no gallop, no distant heart sounds, no friction rub, no midsystolic click and no opening snap.   Murmur heard.  Pulses:       Radial pulses are 2+ on the right side, and 2+ on " the left side.        Dorsalis pedis pulses are 2+ on the right side, and 2+ on the left side.        Posterior tibial pulses are 2+ on the right side, and 2+ on the left side.   Pulmonary/Chest: Effort normal and breath sounds normal. She has no wheezes. She has no rales.   Abdominal: Soft. Bowel sounds are normal. She exhibits no mass. There is no tenderness. There is no guarding.       Diagnostic Data:  Procedures      ASSESSMENT:   Diagnosis Plan   1. RAMIREZ (dyspnea on exertion)     2. Syncope and collapse     3. Essential hypertension         PLAN:  Dyspnea multifactorial with diastolic dysfunction continued observation and blood pressure control    Syncope improved following normalization of blood counts    Hypertension controlled concomitant amlodipine lisinopril  Continue current medical regimen I will see her back in 1 year    Vermeesch, Marilyn K, MD, thank you for referring Ms. Maurice for evaluation.  I have forwarded my electronically generated recommendations to you for review.  Please do not hesitate to call with any questions.      Avi Velez MD, FACC

## 2019-12-05 ENCOUNTER — HOSPITAL ENCOUNTER (EMERGENCY)
Facility: HOSPITAL | Age: 84
Discharge: HOME | End: 2019-12-05
Attending: EMERGENCY MEDICINE | Admitting: EMERGENCY MEDICINE

## 2019-12-05 ENCOUNTER — APPOINTMENT (OUTPATIENT)
Dept: CT IMAGING | Facility: HOSPITAL | Age: 84
End: 2019-12-05

## 2019-12-05 VITALS
RESPIRATION RATE: 18 BRPM | OXYGEN SATURATION: 95 % | BODY MASS INDEX: 20.45 KG/M2 | WEIGHT: 115.4 LBS | DIASTOLIC BLOOD PRESSURE: 56 MMHG | SYSTOLIC BLOOD PRESSURE: 141 MMHG | HEART RATE: 74 BPM | HEIGHT: 63 IN | TEMPERATURE: 98 F

## 2019-12-05 DIAGNOSIS — R44.3 HALLUCINATIONS: Primary | ICD-10-CM

## 2019-12-05 LAB
ALBUMIN SERPL-MCNC: 3.9 G/DL (ref 3.5–5.2)
ALBUMIN/GLOB SERPL: 1.4 G/DL
ALP SERPL-CCNC: 53 U/L (ref 39–117)
ALT SERPL W P-5'-P-CCNC: 6 U/L (ref 1–33)
ANION GAP SERPL CALCULATED.3IONS-SCNC: 13.5 MMOL/L (ref 5–15)
AST SERPL-CCNC: 18 U/L (ref 1–32)
BASOPHILS # BLD AUTO: 0.05 10*3/MM3 (ref 0–0.2)
BASOPHILS NFR BLD AUTO: 0.8 % (ref 0–1.5)
BILIRUB SERPL-MCNC: 0.3 MG/DL (ref 0.2–1.2)
BILIRUB UR QL STRIP: NEGATIVE
BUN BLD-MCNC: 15 MG/DL (ref 8–23)
BUN/CREAT SERPL: 20.5 (ref 7–25)
CALCIUM SPEC-SCNC: 9 MG/DL (ref 8.2–9.6)
CHLORIDE SERPL-SCNC: 100 MMOL/L (ref 98–107)
CLARITY UR: CLEAR
CO2 SERPL-SCNC: 27.5 MMOL/L (ref 22–29)
COLOR UR: YELLOW
CREAT BLD-MCNC: 0.73 MG/DL (ref 0.57–1)
DEPRECATED RDW RBC AUTO: 49.1 FL (ref 37–54)
EOSINOPHIL # BLD AUTO: 0.12 10*3/MM3 (ref 0–0.4)
EOSINOPHIL NFR BLD AUTO: 1.9 % (ref 0.3–6.2)
ERYTHROCYTE [DISTWIDTH] IN BLOOD BY AUTOMATED COUNT: 13.6 % (ref 12.3–15.4)
GFR SERPL CREATININE-BSD FRML MDRD: 74 ML/MIN/1.73
GLOBULIN UR ELPH-MCNC: 2.8 GM/DL
GLUCOSE BLD-MCNC: 157 MG/DL (ref 65–99)
GLUCOSE UR STRIP-MCNC: NEGATIVE MG/DL
HCT VFR BLD AUTO: 36.4 % (ref 34–46.6)
HGB BLD-MCNC: 11.9 G/DL (ref 12–15.9)
HGB UR QL STRIP.AUTO: NEGATIVE
HOLD SPECIMEN: NORMAL
HOLD SPECIMEN: NORMAL
IMM GRANULOCYTES # BLD AUTO: 0.03 10*3/MM3 (ref 0–0.05)
IMM GRANULOCYTES NFR BLD AUTO: 0.5 % (ref 0–0.5)
KETONES UR QL STRIP: ABNORMAL
LEUKOCYTE ESTERASE UR QL STRIP.AUTO: NEGATIVE
LYMPHOCYTES # BLD AUTO: 1.71 10*3/MM3 (ref 0.7–3.1)
LYMPHOCYTES NFR BLD AUTO: 27.3 % (ref 19.6–45.3)
MCH RBC QN AUTO: 31.6 PG (ref 26.6–33)
MCHC RBC AUTO-ENTMCNC: 32.7 G/DL (ref 31.5–35.7)
MCV RBC AUTO: 96.8 FL (ref 79–97)
MONOCYTES # BLD AUTO: 0.47 10*3/MM3 (ref 0.1–0.9)
MONOCYTES NFR BLD AUTO: 7.5 % (ref 5–12)
NEUTROPHILS # BLD AUTO: 3.88 10*3/MM3 (ref 1.7–7)
NEUTROPHILS NFR BLD AUTO: 62 % (ref 42.7–76)
NITRITE UR QL STRIP: NEGATIVE
NRBC BLD AUTO-RTO: 0 /100 WBC (ref 0–0.2)
PH UR STRIP.AUTO: <=5 [PH] (ref 5–8)
PLATELET # BLD AUTO: 231 10*3/MM3 (ref 140–450)
PMV BLD AUTO: 10.7 FL (ref 6–12)
POTASSIUM BLD-SCNC: 3.7 MMOL/L (ref 3.5–5.2)
PROT SERPL-MCNC: 6.7 G/DL (ref 6–8.5)
PROT UR QL STRIP: NEGATIVE
RBC # BLD AUTO: 3.76 10*6/MM3 (ref 3.77–5.28)
SODIUM BLD-SCNC: 141 MMOL/L (ref 136–145)
SP GR UR STRIP: 1.02 (ref 1–1.03)
TSH SERPL DL<=0.05 MIU/L-ACNC: 3.97 UIU/ML (ref 0.27–4.2)
UROBILINOGEN UR QL STRIP: ABNORMAL
WBC NRBC COR # BLD: 6.26 10*3/MM3 (ref 3.4–10.8)
WHOLE BLOOD HOLD SPECIMEN: NORMAL
WHOLE BLOOD HOLD SPECIMEN: NORMAL

## 2019-12-05 PROCEDURE — 84443 ASSAY THYROID STIM HORMONE: CPT | Performed by: EMERGENCY MEDICINE

## 2019-12-05 PROCEDURE — 70450 CT HEAD/BRAIN W/O DYE: CPT

## 2019-12-05 PROCEDURE — 80053 COMPREHEN METABOLIC PANEL: CPT | Performed by: EMERGENCY MEDICINE

## 2019-12-05 PROCEDURE — 99283 EMERGENCY DEPT VISIT LOW MDM: CPT

## 2019-12-05 PROCEDURE — 85025 COMPLETE CBC W/AUTO DIFF WBC: CPT | Performed by: EMERGENCY MEDICINE

## 2019-12-05 PROCEDURE — 81003 URINALYSIS AUTO W/O SCOPE: CPT | Performed by: EMERGENCY MEDICINE

## 2019-12-05 RX ORDER — SODIUM CHLORIDE 0.9 % (FLUSH) 0.9 %
10 SYRINGE (ML) INJECTION AS NEEDED
Status: DISCONTINUED | OUTPATIENT
Start: 2019-12-05 | End: 2019-12-05 | Stop reason: HOSPADM

## 2019-12-05 RX ORDER — QUETIAPINE FUMARATE 25 MG/1
25 TABLET, FILM COATED ORAL NIGHTLY
Qty: 15 TABLET | Refills: 0 | OUTPATIENT
Start: 2019-12-05 | End: 2019-12-06

## 2019-12-05 NOTE — ED PROVIDER NOTES
Subjective   History of Present Illness    Chief Complaint: Hallucinations  History of Present Illness: Patient is a 89-year-old female lives with family at home.  They state that patient has been having increasing hallucinations, worse at night over the last few days.  No new medications.  Family reports mild cognitive decline over the last few years  Onset: Last few days  Duration: Persistent  Exacerbating / Alleviating factors: None  Associated symptoms: None      Nurses Notes reviewed and agree, including vitals, allergies, social history and prior medical history.     REVIEW OF SYSTEMS: All systems reviewed and not pertinent unless noted.    Positive for: Hallucinations    Negative for: Fever, urinary symptoms, GI bleeding, weakness  Review of Systems    Past Medical History:   Diagnosis Date   • Dementia (CMS/HCC)    • Dyspnea    • Environmental allergies    • Hypertension    • Macular degeneration 6/23/2016   • Syncope        Allergies   Allergen Reactions   • Atorvastatin Other (See Comments)   • Crestor  [Rosuvastatin Calcium] Other (See Comments)   • Other    • Penicillins Other (See Comments)   • Tetanus Toxoid Other (See Comments)   • Zocor  [Simvastatin] Other (See Comments)       Past Surgical History:   Procedure Laterality Date   • ADENOIDECTOMY     • APPENDECTOMY     • COLON SURGERY     • COLONOSCOPY     • COLONOSCOPY N/A 10/30/2018    Procedure: COLONOSCOPY, polypectomy;  Surgeon: Harsh Sharif MD;  Location: [facility] ENDOSCOPY;  Service: Gastroenterology   • DENTAL PROCEDURE     • ENDOSCOPY N/A 10/30/2018    Procedure: ESOPHAGOGASTRODUODENOSCOPY;  Surgeon: Harsh Sharif MD;  Location: [facility] ENDOSCOPY;  Service: Gastroenterology   • TONSILLECTOMY         Family History   Problem Relation Age of Onset   • Cancer Mother    • Diabetes Mother    • Heart attack Father    • Cancer Sister        Social History     Socioeconomic History   • Marital status:      Spouse name: Not on file   •  Number of children: Not on file   • Years of education: Not on file   • Highest education level: Not on file   Tobacco Use   • Smoking status: Never Smoker   • Smokeless tobacco: Never Used   Substance and Sexual Activity   • Alcohol use: No   • Drug use: No   • Sexual activity: Defer           Objective   Physical Exam      GENERAL APPEARANCE: Well developed, well nourished, in no acute distress.  VITAL SIGNS: per nursing, reviewed and noted  SKIN: no rashes, ulcerations or petechiae.  Head: Normocephalic, atraumatic.   EYES: perrla. EOMI.  ENT:  TM clear, posterior pharynx patent.  LUNGS:  normal breath sounds. No retractions.   CARDIOVASCULAR:  regular rate and rhythm, no murmurs.  Good Peripheral pulses.  ABDOMEN: Soft, nontender, normal bowel sounds. No hernia. No ascites.  MUSCULOSKELETAL:  No tenderness. Full ROM. Strength and tone normal.  NEUROLOGIC: Alert, oriented x 3.  GCS 15.  No focal weakness.  No cerebellar signs.  No meningeal signs.  No facial droop.    NECK: Supple, symmetric. No tenderness, no masses. Full ROM  Back: full rom, no paraspinal spasm. No CVA tenderness.   PSYCH: appropriate affect, no current hallucinations.    : no bladder tenderness or distention, no CVA tenderness      Procedures     No attending provider procedures were performed      ED Course  ED Course as of Dec 06 0732   Thu Dec 05, 2019   1138   TECHNIQUE: Multiple axial CT images were performed from the foramen  magnum to the vertex without enhancement.      FINDINGS: There is generalized cerebral volume loss. Patchy  hypodensities in the periventricular white matter consistent with  chronic small vessel ischemic change. There is no CT evidence of  hemorrhage. Note is made of a parenchymal calcification in the left  cerebellar hemisphere. There is no mass, mass effect or midline shift.   There is no hydrocephalus. The paranasal sinuses are clear. Bone windows  reveal no acute osseous abnormalities.       Impression    No  acute intracranial process.         [PF]   1205 Discussed with Dr. Vermeesch, she requested addition of Seroquel 25 mg 1 p.o. 1 hour before bedtime, outpatient follow-up in 10 days.  [PF]   Fri Dec 06, 2019   0730 TSH Baseline: 3.970 [PF]   0730 Appearance, UA: Clear [PF]   0730 Color, UA: Yellow [PF]   0730 pH, UA: <=5.0 [PF]   0730 Specific Gravity, UA: 1.023 [PF]   0730 Glucose: Negative [PF]   0730 Ketones, UA: (!) Trace [PF]   0730 Bilirubin, UA: Negative [PF]   0730 Blood, UA: Negative [PF]   0730 Protein, UA: Negative [PF]   0730 Leukocytes, UA: Negative [PF]   0730 Nitrite, UA: Negative [PF]   0730 Urobilinogen, UA: 0.2 E.U./dL [PF]   0730 Glucose: (!) 157 [PF]   0730 BUN: 15 [PF]   0730 Creatinine: 0.73 [PF]   0730 Sodium: 141 [PF]   0730 Chloride: 100 [PF]   0730 CO2: 27.5 [PF]   0730 Calcium: 9.0 [PF]   0730 Total Protein: 6.7 [PF]   0731 Albumin: 3.90 [PF]   0731 ALT (SGPT): 6 [PF]   0731 AST (SGOT): 18 [PF]   0731 Alkaline Phosphatase: 53 [PF]   0731 Total Bilirubin: 0.3 [PF]   0731 WBC: 6.26 [PF]   0731 Hemoglobin: (!) 11.9 [PF]   0731 Hematocrit: 36.4 [PF]   0731 Platelets: 231 [PF]      ED Course User Index  [PF] Jacques Ayala, DO                  MDM  89 yo wf, increasing hallucinations, decreased sleep. Discussed with pt. Pcp, will add seroquel 25 mg q nightly.   Final diagnoses:   Hallucinations              Jacques Ayala,   12/06/19 0732

## 2019-12-06 ENCOUNTER — HOSPITAL ENCOUNTER (EMERGENCY)
Facility: HOSPITAL | Age: 84
Discharge: HOME | End: 2019-12-06
Attending: STUDENT IN AN ORGANIZED HEALTH CARE EDUCATION/TRAINING PROGRAM | Admitting: STUDENT IN AN ORGANIZED HEALTH CARE EDUCATION/TRAINING PROGRAM

## 2019-12-06 ENCOUNTER — TELEPHONE (OUTPATIENT)
Dept: INTERNAL MEDICINE | Facility: CLINIC | Age: 84
End: 2019-12-06

## 2019-12-06 VITALS
SYSTOLIC BLOOD PRESSURE: 138 MMHG | TEMPERATURE: 98.3 F | RESPIRATION RATE: 18 BRPM | DIASTOLIC BLOOD PRESSURE: 72 MMHG | WEIGHT: 121 LBS | HEIGHT: 63 IN | OXYGEN SATURATION: 95 % | HEART RATE: 78 BPM | BODY MASS INDEX: 21.44 KG/M2

## 2019-12-06 DIAGNOSIS — F03.91 DEMENTIA WITH BEHAVIORAL DISTURBANCE, UNSPECIFIED DEMENTIA TYPE: Primary | ICD-10-CM

## 2019-12-06 PROCEDURE — 63710000001 DIPHENHYDRAMINE PER 50 MG: Performed by: STUDENT IN AN ORGANIZED HEALTH CARE EDUCATION/TRAINING PROGRAM

## 2019-12-06 PROCEDURE — 99284 EMERGENCY DEPT VISIT MOD MDM: CPT

## 2019-12-06 RX ORDER — LANOLIN ALCOHOL/MO/W.PET/CERES
6 CREAM (GRAM) TOPICAL NIGHTLY
Qty: 60 TABLET | Refills: 0 | Status: SHIPPED | OUTPATIENT
Start: 2019-12-06

## 2019-12-06 RX ORDER — DIPHENHYDRAMINE HCL 25 MG
25 CAPSULE ORAL ONCE
Status: COMPLETED | OUTPATIENT
Start: 2019-12-06 | End: 2019-12-06

## 2019-12-06 RX ORDER — ALPRAZOLAM 0.5 MG/1
0.5 TABLET ORAL ONCE
Status: COMPLETED | OUTPATIENT
Start: 2019-12-06 | End: 2019-12-06

## 2019-12-06 RX ORDER — QUETIAPINE FUMARATE 25 MG/1
50 TABLET, FILM COATED ORAL ONCE
Status: COMPLETED | OUTPATIENT
Start: 2019-12-06 | End: 2019-12-06

## 2019-12-06 RX ORDER — QUETIAPINE FUMARATE 50 MG/1
50 TABLET, FILM COATED ORAL NIGHTLY
Qty: 30 TABLET | Refills: 0 | Status: SHIPPED | OUTPATIENT
Start: 2019-12-06 | End: 2019-12-13 | Stop reason: SDUPTHER

## 2019-12-06 RX ADMIN — ALPRAZOLAM 0.5 MG: 0.5 TABLET ORAL at 05:02

## 2019-12-06 RX ADMIN — DIPHENHYDRAMINE HYDROCHLORIDE 25 MG: 25 CAPSULE ORAL at 05:46

## 2019-12-06 RX ADMIN — QUETIAPINE FUMARATE 50 MG: 25 TABLET, FILM COATED ORAL at 03:29

## 2019-12-06 NOTE — TELEPHONE ENCOUNTER
Spoke with Pt's son, Chad, states ER doctor spoke with him after speaking with Dr. Vermeesch. Son states he's willing to try giving Pt Seroquel tonight as direct but he's worried that it's going to make her worse. States that after the Pt made it to the ER today she was better, her usual self. But not too long after she was given the dose of Seroquel, she started seeing things and acting out again. States a while after she was given Xanax and Benadryl, she did calm down. The son is aware that the medication plan was discussed with you but he was wondering if the Seroquel could be having an opposite reaction.

## 2019-12-06 NOTE — TELEPHONE ENCOUNTER
It is unusual for Seroquel to make people have hallucinations or worsening anxiety.  I recommend that they give the Benadryl and melatonin at same time as Seroquel.  I suspect that she is having worsening sundown and beginning to have agitation or behavior issues with her Alzheimer's.  That is a common thing as Alzheimer's or dementia worsens.

## 2019-12-06 NOTE — TELEPHONE ENCOUNTER
Patient son called and states patient was placed on a new medication yesterday after a visit to the er and it made her more wild last night. They had to call ems back last night to take her back to the emergency room. He would like to speak with nurse to discuss.

## 2019-12-06 NOTE — ED PROVIDER NOTES
Subjective   89-year-old female who presents via EMS after acting out having behavioral issues secondary to Alzheimer's and sundowning.  Patient was seen earlier today at our facility for agitation and aggression.  She was prescribed Seroquel 25 mg to be taken at bedtime.  Patient son states normally she takes 6 mg of melatonin as well as a Tylenol PM about an hour before bed.  He did hold melatonin as well as a Tylenol PM this evening.  States that he gave her the Seroquel around 6 when she ate her dinner.  He reports that this did not help with the patient became quite agitated as the evening went on.  Reports that she is hallucinating and seeing people that are not there and  a long time ago as well as animals including dogs.  States he is afraid to go to sleep because he is afraid she may hurt herself.  States that she has become aggressive towards him is throwing stuff in him including garbage from 1 of the trash cans.          Review of Systems   Unable to perform ROS: Dementia       Past Medical History:   Diagnosis Date   • Dementia (CMS/[facility])    • Dyspnea    • Environmental allergies    • Hypertension    • Macular degeneration 2016   • Syncope        Allergies   Allergen Reactions   • Atorvastatin Other (See Comments)   • Crestor  [Rosuvastatin Calcium] Other (See Comments)   • Other    • Penicillins Other (See Comments)   • Tetanus Toxoid Other (See Comments)   • Zocor  [Simvastatin] Other (See Comments)       Past Surgical History:   Procedure Laterality Date   • ADENOIDECTOMY     • APPENDECTOMY     • COLON SURGERY     • COLONOSCOPY     • COLONOSCOPY N/A 10/30/2018    Procedure: COLONOSCOPY, polypectomy;  Surgeon: Harsh Sharif MD;  Location: [facility] ENDOSCOPY;  Service: Gastroenterology   • DENTAL PROCEDURE     • ENDOSCOPY N/A 10/30/2018    Procedure: ESOPHAGOGASTRODUODENOSCOPY;  Surgeon: Harsh Sharif MD;  Location: [facility] ENDOSCOPY;  Service: Gastroenterology   • TONSILLECTOMY          Family History   Problem Relation Age of Onset   • Cancer Mother    • Diabetes Mother    • Heart attack Father    • Cancer Sister        Social History     Socioeconomic History   • Marital status:      Spouse name: Not on file   • Number of children: Not on file   • Years of education: Not on file   • Highest education level: Not on file   Tobacco Use   • Smoking status: Never Smoker   • Smokeless tobacco: Never Used   Substance and Sexual Activity   • Alcohol use: No   • Drug use: No   • Sexual activity: Defer           Objective   Physical Exam   Nursing note and vitals reviewed.    GEN: No acute distress  Head: Normocephalic, atraumatic  Eyes: Pupils equal round reactive to light  ENT: Posterior pharynx normal in appearance, oral mucosa is moist  Chest: Nontender to palpation  Cardiovascular: Regular rate  Lungs: Clear to auscultation bilaterally  Abdomen: Soft, nontender, nondistended, no peritoneal signs  Extremities: No edema, normal appearance  Neuro: Alert oriented to self and her son.  Moves all 4 extremities in a coordinated manner as evidenced by the way she is attempted to get out of bed multiple times and has been caught wandering in the room.  Psych: Mildly agitated, but currently redirectable    Procedures           ED Course                  MDM  Number of Diagnoses or Management Options  Diagnosis management comments: Patient had a full work-up yesterday including laboratories and imaging.  Do believe patient is sundowning and progressively worsening.  While in the emergency department I did consult our night pharmacist Randolph he thought we should double the dose of Seroquel as normally it does tend to call patient is down.  I did give her this and it did not seem to help at all.  The patient became much more agitated as the night went on.  Patient was given half milligram Xanax tablet and then later 25 mg of Benadryl.  About an hour after the Benadryl the patient finally did drift off to  sleep after her nurse began playing music for her.  I truly feel sorry with this patient's family.  At this time we are going to consult with  to help find nursing home placement.  Son is well aware that this may not happen today and that she does not have a diagnosis or reason for admission.       Amount and/or Complexity of Data Reviewed  Clinical lab tests: reviewed  Decide to obtain previous medical records or to obtain history from someone other than the patient: yes  Obtain history from someone other than the patient: yes  Review and summarize past medical records: yes        Final diagnoses:   Dementia with behavioral disturbance, unspecified dementia type (CMS/[facility])              Ashok Mitchell MD  12/07/19 100

## 2019-12-06 NOTE — DISCHARGE INSTRUCTIONS
Resume use of melatonin 6 mg by mouth at bedtime as well as Tylenol PM at bedtime.  Do increase the Seroquel dose to 50 mg by mouth 1 hour before bedtime.  The patient still is restless and agitated contact Dr. Vermeesch for further guidance.

## 2019-12-09 ENCOUNTER — PATIENT OUTREACH (OUTPATIENT)
Dept: CASE MANAGEMENT | Facility: OTHER | Age: 84
End: 2019-12-09

## 2019-12-09 ENCOUNTER — EPISODE CHANGES (OUTPATIENT)
Dept: CASE MANAGEMENT | Facility: OTHER | Age: 84
End: 2019-12-09

## 2019-12-09 NOTE — OUTREACH NOTE
Patient Outreach Note    Called patient/ son/ POA, Denny, following ED visits 12/5 and 12/6 with Altered Mental Status.  Denny said his brother, Chad, has his mother at this time, and gave Chad's phone numbers:  [phone] and [phone], asking ACM to call Chad.    Marina Lee RN  Ambulatory     12/9/2019, 3:35 PM

## 2019-12-12 ENCOUNTER — PATIENT OUTREACH (OUTPATIENT)
Dept: CASE MANAGEMENT | Facility: OTHER | Age: 84
End: 2019-12-12

## 2019-12-12 ENCOUNTER — EPISODE CHANGES (OUTPATIENT)
Dept: CASE MANAGEMENT | Facility: OTHER | Age: 84
End: 2019-12-12

## 2019-12-12 NOTE — OUTREACH NOTE
Patient Outreach Note    Patient's son, Chad, returned call to [facility], after receiving voicemail from earlier call today. Explained role of Ambulatory  and contact information given.  Discussed ED discharge recommendations; importance of close PCP follow up.  Chad said he and his wife are keeping his mother at this time at their house; normally she is at brother, Denny's home.  Chad said his mother is taking the medications as prescribed; family manages the medication and administers.  Patient remains confused but calmer, and manageable.  [facility] reminded Chad of PCP appointment tomorrow, 12-13-19, and he is planning on taking her.  Chad had questions about medications for patient, and plans to talk to Dr. Vermeesch tomorrow.  No other questions or concerns voiced at this time.  Noted, AWV is scheduled for 1-14-20; no Care Gaps.  Advanced Directives are on file.  Conversation brief, per son.      Marina Lee RN  Ambulatory     12/12/2019, 12:08 PM

## 2019-12-13 ENCOUNTER — OFFICE VISIT (OUTPATIENT)
Dept: INTERNAL MEDICINE | Facility: CLINIC | Age: 84
End: 2019-12-13

## 2019-12-13 VITALS
WEIGHT: 116 LBS | HEART RATE: 85 BPM | DIASTOLIC BLOOD PRESSURE: 78 MMHG | HEIGHT: 63 IN | TEMPERATURE: 98.1 F | BODY MASS INDEX: 20.55 KG/M2 | SYSTOLIC BLOOD PRESSURE: 132 MMHG | OXYGEN SATURATION: 95 %

## 2019-12-13 DIAGNOSIS — F02.818 LATE ONSET ALZHEIMER'S DISEASE WITH BEHAVIORAL DISTURBANCE (HCC): Primary | ICD-10-CM

## 2019-12-13 DIAGNOSIS — G30.1 LATE ONSET ALZHEIMER'S DISEASE WITH BEHAVIORAL DISTURBANCE (HCC): Primary | ICD-10-CM

## 2019-12-13 PROBLEM — J18.9 PNEUMONIA DUE TO INFECTIOUS ORGANISM: Status: RESOLVED | Noted: 2019-09-19 | Resolved: 2019-12-13

## 2019-12-13 PROCEDURE — 99214 OFFICE O/P EST MOD 30 MIN: CPT | Performed by: INTERNAL MEDICINE

## 2019-12-13 RX ORDER — QUETIAPINE FUMARATE 50 MG/1
50 TABLET, FILM COATED ORAL NIGHTLY
Qty: 30 TABLET | Refills: 6 | Status: SHIPPED | OUTPATIENT
Start: 2019-12-13 | End: 2019-12-13 | Stop reason: SDUPTHER

## 2019-12-13 RX ORDER — QUETIAPINE FUMARATE 25 MG/1
TABLET, FILM COATED ORAL
Qty: 30 TABLET | Refills: 3 | Status: SHIPPED | OUTPATIENT
Start: 2019-12-13 | End: 2020-08-25 | Stop reason: SDUPTHER

## 2019-12-13 RX ORDER — MIRTAZAPINE 15 MG/1
15 TABLET, FILM COATED ORAL NIGHTLY
Qty: 30 TABLET | Refills: 3 | Status: SHIPPED | OUTPATIENT
Start: 2019-12-13 | End: 2020-04-03

## 2019-12-13 RX ORDER — QUETIAPINE FUMARATE 50 MG/1
50 TABLET, FILM COATED ORAL NIGHTLY
Qty: 30 TABLET | Refills: 6 | Status: SHIPPED | OUTPATIENT
Start: 2019-12-13 | End: 2020-07-20

## 2019-12-13 NOTE — PROGRESS NOTES
Chief Complaint   Patient presents with   • Follow-up     Pt went to [facility] ER on 12/5 due to hallucinations. Pt was given script for Seroquel 25 mg nightly. Pt went back to [facility] ER due to agitation, aggression, hallucinating, and seeing people that are not there     Subjective   Adelia Maurice is a 89 y.o. female.     Patient is here today for ER follow-up from 12 /5 and 12/ 6 due to episodic confusion.  She has been having hallucinations at night associated with agitation, aggression and confusion.  ER doctor called me during her ER visit and we discussed her symptoms.  She was started on Seroquel 25 mg nightly with a second dose as needed for any worsening symptoms.  She is currently on Seroquel 50 mg at night.  Of note, labs and urinalysis obtained in emergency room were all within normal limits.  She is having hallucinations, visually during the day and picking things off of her.  She slept well for a few nights after she was in ER.    She is getting seroquel 50 mg, tylenol PM and melatonin 6 mg qhs to help with sleep.  She has not been sleeping the past few nights.  Over past few nights she has been up and down all night long.  Family has been sleeping with her to help prevent falls.  She has been very agitated and does not want to use her walker or cane at night.  Her family is trying to walk with her to prevent falls and she often lashes out or slaps at them.  She is having visual and auditory hallucinations at times.  Family is agreeable to see a neurologist for her progressive dementia..  For past 3-4 days she has been eating well.  Prior to that she did not want to eat much.  Has been having good BMs.         The following portions of the patient's history were reviewed and updated as appropriate: allergies, current medications, past family history, past medical history, past social history, past surgical history and problem list.    Review of Systems   Constitutional: Negative for activity change,  "appetite change and unexpected weight change.   Psychiatric/Behavioral: Positive for agitation, confusion, hallucinations and sleep disturbance. Negative for self-injury.       Objective   /78   Pulse 85   Temp 98.1 °F (36.7 °C)   Ht 160 cm (63\")   Wt 52.6 kg (116 lb)   SpO2 95%   BMI 20.55 kg/m²   Body mass index is 20.55 kg/m².  Physical Exam   Constitutional: She appears well-developed and well-nourished. No distress.   Pleasant elderly female who appears her stated age, she is seated quietly in exam room and does not appear agitated at this time   HENT:   Head: Normocephalic and atraumatic.   Eyes: EOM are normal.   Cardiovascular: Normal rate, regular rhythm, normal heart sounds and intact distal pulses.   No murmur heard.  Pulmonary/Chest: Effort normal and breath sounds normal. No respiratory distress.   Musculoskeletal: She exhibits no edema.   Neurological: She is alert.   Psychiatric: She has a normal mood and affect.   Patient is pleasantly confused today.  She cannot remember my name but does remember I am her doctor.  Family answers all questions for her   Nursing note and vitals reviewed.      Assessment/Plan   Adelia Maurice is here today and the following problems have been addressed:      Adelia was seen today for follow-up.    Diagnoses and all orders for this visit:    Late onset Alzheimer's disease with behavioral disturbance (CMS/HCC)  -     Ambulatory Referral to Neurology    Other orders  -     mirtazapine (REMERON) 15 MG tablet; Take 1 tablet by mouth Every Night.  -     QUEtiapine (SEROquel) 25 MG tablet; May take 1 tab in AM and in PM prn if still agitated  -     QUEtiapine (SEROquel) 50 MG tablet; Take 1 tablet by mouth Every Night.    wean off celexa over the next 2 weeks-written instructions provided to family  Start remeron 15 mg qhs tonight  Continue seroquel 50 mg qhs  Given additional seroquel 25 mg to take in AM or extra dose in PM for any agitation  Refer to Dr Zendejas " for worsening dementia  Continue aricept 10 mg q day  Family given handout on Alzheimer's dementia and how to cope with this illness  They were encouraged to call me for any worsening symptoms if necessary  We discussed possible change from Seroquel to risperidone if necessary    RTC 4 weeks     Please note that portions of this note were completed with a voice recognition program.  Efforts were made to edit dictation, but occasionally words are mistranscribed.

## 2019-12-17 ENCOUNTER — EPISODE CHANGES (OUTPATIENT)
Dept: CASE MANAGEMENT | Facility: OTHER | Age: 84
End: 2019-12-17

## 2019-12-23 RX ORDER — AMLODIPINE BESYLATE 2.5 MG/1
TABLET ORAL
Qty: 90 TABLET | Refills: 0 | Status: SHIPPED | OUTPATIENT
Start: 2019-12-23 | End: 2020-03-19

## 2020-01-03 ENCOUNTER — OFFICE VISIT (OUTPATIENT)
Dept: NEUROLOGY | Facility: CLINIC | Age: 85
End: 2020-01-03

## 2020-01-03 VITALS
HEART RATE: 79 BPM | SYSTOLIC BLOOD PRESSURE: 120 MMHG | HEIGHT: 63 IN | DIASTOLIC BLOOD PRESSURE: 62 MMHG | WEIGHT: 116 LBS | BODY MASS INDEX: 20.55 KG/M2 | OXYGEN SATURATION: 94 %

## 2020-01-03 DIAGNOSIS — F02.818 LATE ONSET ALZHEIMER'S DISEASE WITH BEHAVIORAL DISTURBANCE (HCC): Primary | ICD-10-CM

## 2020-01-03 DIAGNOSIS — G30.1 LATE ONSET ALZHEIMER'S DISEASE WITH BEHAVIORAL DISTURBANCE (HCC): Primary | ICD-10-CM

## 2020-01-03 PROCEDURE — 99204 OFFICE O/P NEW MOD 45 MIN: CPT | Performed by: PSYCHIATRY & NEUROLOGY

## 2020-01-03 RX ORDER — MEMANTINE HYDROCHLORIDE 10 MG/1
10 TABLET ORAL 2 TIMES DAILY
Qty: 60 TABLET | Refills: 11 | Status: SHIPPED | OUTPATIENT
Start: 2020-01-03 | End: 2021-01-02

## 2020-01-03 RX ORDER — ACETAMINOPHEN 500 MG
500 TABLET ORAL DAILY
COMMUNITY
End: 2020-08-25

## 2020-01-03 NOTE — PROGRESS NOTES
"Subjective     CC: memory loss    History of Present Illness   Adelia Maurice is a 89 y.o. female who comes to clinic today for evaluation of memory impairment. Her family has noted symptoms since approximately 2017 marked initially by forgetfulness and repetitiveness. This has gradually worsened  over time. Additional associated symptoms have included impairments in executive function. She has had associated  symptoms of agitation  and hallucinations.  Seroquel has helped with her hallucinations.    Prior evaluation has included screening blood work  and a head CT  which were unremarkable . She is currently taking donepezil, mirtazapine and Seroquel.    I have reviewed and confirmed the past family, social and medical history as accurate on 1/3/20.     Review of Systems   Constitutional: Negative.    Respiratory: Negative.    Cardiovascular: Negative.    Gastrointestinal: Negative.    Genitourinary: Negative.    All other systems reviewed and are negative.      Objective   General appearance today is normal.   Peripheral pulses were present and symmetric.   The ophthalmoscopic exam today is unremarkable. The discs and posterior elements are unremarkable.    /62   Pulse 79   Ht 160 cm (63\")   Wt 52.6 kg (116 lb)   SpO2 94%   BMI 20.55 kg/m²     Physical Exam   Neurological: She has normal strength. She has a normal Finger-Nose-Finger Test. Gait normal.   Reflex Scores:       Tricep reflexes are 1+ on the right side and 1+ on the left side.       Bicep reflexes are 1+ on the right side and 1+ on the left side.       Brachioradialis reflexes are 1+ on the right side and 1+ on the left side.  Psychiatric: Her speech is normal.        Neurologic Exam     Mental Status   Oriented to person.   Disoriented to place.   Disoriented to time.   Registration: recalls 3 of 3 objects. Recall of objects at 5 minutes:  0/3. Follows 3 step commands.   Attention: normal.   Speech: speech is normal   Level of " consciousness: alert  Knowledge: poor.   Able to name object. Able to read. Able to repeat. Able to write. Normal comprehension.     Cranial Nerves   Cranial nerves II through XII intact.     Motor Exam   Muscle bulk: normal  Overall muscle tone: normal    Strength   Strength 5/5 throughout.     Sensory Exam   Light touch normal.     Gait, Coordination, and Reflexes     Gait  Gait: normal    Coordination   Finger to nose coordination: normal    Reflexes   Right brachioradialis: 1+  Left brachioradialis: 1+  Right biceps: 1+  Left biceps: 1+  Right triceps: 1+  Left triceps: 1+      MMSE=20      Assessment/Plan   Adelia was seen today for memory loss, tremors and hallucinations.    Diagnoses and all orders for this visit:    Late onset Alzheimer's disease with behavioral disturbance (CMS/HCC)    Other orders  -     memantine (NAMENDA) 10 MG tablet; Take 1 tablet by mouth 2 (Two) Times a Day.          DISCUSSION/SUMMARY    Adelia Maurice comes to clinic today for evaluation of memory impairment. Her history and examination, including bedside cognitive testing are most consistent with Alzheimer's Disease , which was discussed. I discussed treatment options, including a potential trial of memantine, and her son will check on cost. Otherwise, I have recommended that she continue on her current medications unchanged for now. She will then follow up as needed.    As part of this visit I reviewed prior lab results, reviewed radiology results and obtained additional history from the family which is incorporated in the HPI. Please see above for additional details.

## 2020-01-24 RX ORDER — DONEPEZIL HYDROCHLORIDE 10 MG/1
TABLET, FILM COATED ORAL
Qty: 60 TABLET | Refills: 0 | Status: SHIPPED | OUTPATIENT
Start: 2020-01-24 | End: 2020-02-28

## 2020-01-24 RX ORDER — LISINOPRIL 10 MG/1
TABLET ORAL
Qty: 90 TABLET | Refills: 0 | Status: SHIPPED | OUTPATIENT
Start: 2020-01-24 | End: 2020-03-19

## 2020-02-25 ENCOUNTER — OFFICE VISIT (OUTPATIENT)
Dept: INTERNAL MEDICINE | Facility: CLINIC | Age: 85
End: 2020-02-25

## 2020-02-25 VITALS
DIASTOLIC BLOOD PRESSURE: 62 MMHG | HEIGHT: 63 IN | OXYGEN SATURATION: 98 % | SYSTOLIC BLOOD PRESSURE: 122 MMHG | WEIGHT: 122 LBS | BODY MASS INDEX: 21.62 KG/M2 | TEMPERATURE: 97.1 F | HEART RATE: 88 BPM

## 2020-02-25 DIAGNOSIS — G30.1 LATE ONSET ALZHEIMER'S DISEASE WITH BEHAVIORAL DISTURBANCE (HCC): ICD-10-CM

## 2020-02-25 DIAGNOSIS — I10 ESSENTIAL HYPERTENSION: ICD-10-CM

## 2020-02-25 DIAGNOSIS — F32.0 CURRENT MILD EPISODE OF MAJOR DEPRESSIVE DISORDER WITHOUT PRIOR EPISODE (HCC): ICD-10-CM

## 2020-02-25 DIAGNOSIS — F02.818 LATE ONSET ALZHEIMER'S DISEASE WITH BEHAVIORAL DISTURBANCE (HCC): ICD-10-CM

## 2020-02-25 DIAGNOSIS — E53.8 VITAMIN B 12 DEFICIENCY: ICD-10-CM

## 2020-02-25 DIAGNOSIS — Z00.00 ENCOUNTER FOR MEDICARE ANNUAL WELLNESS EXAM: Primary | ICD-10-CM

## 2020-02-25 DIAGNOSIS — E55.9 VITAMIN D DEFICIENCY: ICD-10-CM

## 2020-02-25 PROCEDURE — G0439 PPPS, SUBSEQ VISIT: HCPCS | Performed by: INTERNAL MEDICINE

## 2020-02-25 NOTE — PROGRESS NOTES
The ABCs of the Annual Wellness Visit  Subsequent Medicare Wellness Visit    Chief Complaint   Patient presents with   • Medicare Wellness-subsequent       Subjective   History of Present Illness:  Adelia Maurice is a 89 y.o. female who presents for a Subsequent Medicare Wellness Visit.  PMH of HTN, vit D/B12 def, Alzheimers dementia, depression and DJD.  She is living with her sons right now.  She eats a good breakfast and not always so well later in the day.  Her BP is well controlled today, also good at home.  Her memory is unchanged.  She has been sleeping well overall.  She takes seroquel 25 mg during day only if needed and 50 mg every PM.  She denies any pain.  She does feel sad.     HEALTH RISK ASSESSMENT    Recent Hospitalizations:  Recently treated at the following:  [facility]    Current Medical Providers:  Patient Care Team:  Vermeesch, Marilyn K, MD as PCP - General  Vermeesch, Marilyn K, MD as PCP - Family Medicine  Vermeesch, Marilyn K, MD as PCP - Claims Attributed    Smoking Status:  Social History     Tobacco Use   Smoking Status Never Smoker   Smokeless Tobacco Never Used       Alcohol Consumption:  Social History     Substance and Sexual Activity   Alcohol Use No       Depression Screen:   PHQ-2/PHQ-9 Depression Screening 2/25/2020   Little interest or pleasure in doing things 0   Feeling down, depressed, or hopeless 0   Trouble falling or staying asleep, or sleeping too much -   Feeling tired or having little energy -   Poor appetite or overeating -   Feeling bad about yourself - or that you are a failure or have let yourself or your family down -   Trouble concentrating on things, such as reading the newspaper or watching television -   Moving or speaking so slowly that other people could have noticed. Or the opposite - being so fidgety or restless that you have been moving around a lot more than usual -   Thoughts that you would be better off dead, or of hurting yourself in some  way -   Total Score 0   If you checked off any problems, how difficult have these problems made it for you to do your work, take care of things at home, or get along with other people? -       Fall Risk Screen:  ANAYA Fall Risk Assessment was completed, and patient is at MODERATE risk for falls. Assessment completed on:2/25/2020    Health Habits and Functional and Cognitive Screening:  Functional & Cognitive Status 2/25/2020   Do you have difficulty preparing food and eating? Yes   Do you have difficulty bathing yourself, getting dressed or grooming yourself? Yes   Do you have difficulty using the toilet? No   Do you have difficulty moving around from place to place? Yes   Do you have trouble with steps or getting out of a bed or a chair? Yes   Current Diet Well Balanced Diet   Dental Exam Up to date   Eye Exam Up to date   Exercise (times per week) 0 times per week   Current Exercise Activities Include None   Do you need help using the phone?  Yes   Are you deaf or do you have serious difficulty hearing?  Yes   Do you need help with transportation? Yes   Do you need help shopping? Yes   Do you need help preparing meals?  Yes   Do you need help with housework?  Yes   Do you need help with laundry? Yes   Do you need help taking your medications? Yes   Do you need help managing money? Yes   Do you ever drive or ride in a car without wearing a seat belt? No   Have you felt unusual stress, anger or loneliness in the last month? No   Who do you live with? Child   If you need help, do you have trouble finding someone available to you? No   Do you have difficulty concentrating, remembering or making decisions? Yes         Does the patient have evidence of cognitive impairment? Yes    Asprin use counseling:Taking ASA appropriately as indicated    Age-appropriate Screening Schedule:  Refer to the list below for future screening recommendations based on patient's age, sex and/or medical conditions. Orders for these  recommended tests are listed in the plan section. The patient has been provided with a written plan.    Health Maintenance   Topic Date Due   • MAMMOGRAM  12/07/2017   • INFLUENZA VACCINE  Completed   • TDAP/TD VACCINES  Discontinued   • ZOSTER VACCINE  Discontinued          The following portions of the patient's history were reviewed and updated as appropriate: allergies, current medications, past family history, past medical history, past social history, past surgical history and problem list.    Outpatient Medications Prior to Visit   Medication Sig Dispense Refill   • acetaminophen (TYLENOL) 500 MG tablet Take 500 mg by mouth Daily.     • albuterol sulfate  (90 Base) MCG/ACT inhaler Inhale 2 puffs Every 4 (Four) Hours As Needed for Wheezing or Shortness of Air. 1 inhaler 0   • amLODIPine (NORVASC) 2.5 MG tablet TAKE ONE TABLET BY MOUTH DAILY 90 tablet 0   • aspirin 81 MG tablet Take 1 tablet by mouth Daily. Take one tab every other day 30 tablet 0   • cholecalciferol (VITAMIN D3) 1000 units tablet Take 2,000 Units by mouth Daily.     • donepezil (ARICEPT) 10 MG tablet TAKE TWO TABLETS BY MOUTH EVERY NIGHT 60 tablet 0   • latanoprost (XALATAN) 0.005 % ophthalmic solution Administer 1 drop to both eyes every night.     • Latanoprost 0.005 % emulsion latanoprost 0.005 % eye drops   INSTILL 1 DROP IN EACH EYE EVERY NIGHT AT BEDTIME     • lisinopril (PRINIVIL,ZESTRIL) 10 MG tablet TAKE ONE TABLET BY MOUTH DAILY 90 tablet 0   • melatonin 3 MG tablet Take 2 tablets by mouth Every Night. 60 tablet 0   • memantine (NAMENDA) 10 MG tablet Take 1 tablet by mouth 2 (Two) Times a Day. 60 tablet 11   • mirtazapine (REMERON) 15 MG tablet Take 1 tablet by mouth Every Night. 30 tablet 3   • Multiple Vitamins-Minerals (PRESERVISION AREDS PO) Take  by mouth 2 (Two) Times a Day.     • QUEtiapine (SEROquel) 25 MG tablet May take 1 tab in AM and in PM prn if still agitated 30 tablet 3   • QUEtiapine (SEROquel) 50 MG tablet  "Take 1 tablet by mouth Every Night. 30 tablet 6   • vitamin B-12 (CYANOCOBALAMIN) 100 MCG tablet Take 10 mcg by mouth 3 (Three) Times a Week. Takes Monday, Wednesday and friday       No facility-administered medications prior to visit.        Patient Active Problem List   Diagnosis   • Depression   • Essential hypertension   • Neuralgia   • Macular degeneration   • Arthritis of hip   • Carotid bruit   • Fatigue   • BMI 29.0-29.9,adult   • Environmental allergies   • Vitamin D deficiency   • Vitamin B 12 deficiency   • Late onset Alzheimer's disease with behavioral disturbance (CMS/[facility])   • Encounter for Medicare annual wellness exam       Advanced Care Planning:  ACP discussion was held with the patient during this visit. Patient has an advance directive that is valid in EMR.     Review of Systems   Unable to perform ROS: Dementia   Constitutional: Negative.    HENT: Negative.    Eyes: Negative.    Respiratory: Positive for shortness of breath.         She has RAMIREZ   Cardiovascular: Negative.    Gastrointestinal: Positive for constipation.   Endocrine: Negative.    Genitourinary:        Chronic urine incontinence, wears pull ups   Musculoskeletal: Positive for gait problem.   Skin: Negative.    Allergic/Immunologic: Negative.    Psychiatric/Behavioral: Positive for confusion and dysphoric mood.       Compared to one year ago, the patient feels her physical health is the same.  Compared to one year ago, the patient feels her mental health is worse.    Reviewed chart for potential of high risk medication in the elderly: yes  Reviewed chart for potential of harmful drug interactions in the elderly:yes    Objective         Vitals:    02/25/20 1022   BP: 122/62   Pulse: 88   Temp: 97.1 °F (36.2 °C)   SpO2: 98%   Weight: 55.3 kg (122 lb)   Height: 160 cm (63\")   PainSc: 0-No pain       Body mass index is 21.61 kg/m².  Discussed the patient's BMI with her. The BMI is in the acceptable range.    Physical Exam "   Constitutional: She appears well-developed and well-nourished. No distress.   Pleasant elderly female, appears her stated age, ambulates with use of a cane   HENT:   Head: Normocephalic and atraumatic.   Right Ear: External ear normal.   Left Ear: External ear normal.   Mouth/Throat: Oropharynx is clear and moist.   TMs normal, oropharynx clear, mucous membranes moist   Eyes: Pupils are equal, round, and reactive to light. Conjunctivae and EOM are normal.   Neck: Normal range of motion. Neck supple. No thyromegaly present.   Cardiovascular: Normal rate, regular rhythm and intact distal pulses.   Murmur heard.  No carotid bruits  Systolic murmur grade 1/6 heard over precordium   Pulmonary/Chest: Effort normal and breath sounds normal. No respiratory distress. She has no wheezes.   Abdominal: Soft. Bowel sounds are normal. She exhibits no distension. There is no tenderness.   Musculoskeletal: Normal range of motion. She exhibits no edema.   Ambulates with use of a cane   Lymphadenopathy:     She has no cervical adenopathy.   Neurological: She is alert. No cranial nerve deficit. Coordination normal.   Skin:   Approximate 5 mm gray cystic lesion over mid central chin    Psychiatric: She has a normal mood and affect. Her behavior is normal. Thought content normal.   Patient is very quiet today, she answers very few questions, her son gives most of the history, she does answer yes and no to some questions, pleasantly confused   Nursing note and vitals reviewed.            Assessment/Plan   Medicare Risks and Personalized Health Plan  CMS Preventative Services Quick Reference  Cardiovascular risk  Diabetic Lab Screening   Fall Risk  Inactivity/Sedentary  Osteoprorosis Risk  Urinary Incontinence    The above risks/problems have been discussed with the patient.  Pertinent information has been shared with the patient in the After Visit Summary.  Follow up plans and orders are seen below in the Assessment/Plan  Section.    Diagnoses and all orders for this visit:    1. Encounter for Medicare annual wellness exam (Primary)  -     CBC & Differential  -     Comprehensive Metabolic Panel  -     Lipid Panel With / Chol / HDL Ratio    2. Essential hypertension  -     CBC & Differential  -     Comprehensive Metabolic Panel  -     Lipid Panel With / Chol / HDL Ratio    3. Vitamin D deficiency    4. Vitamin B 12 deficiency    5. Late onset Alzheimer's disease with behavioral disturbance (CMS/HCC)    6. Current mild episode of major depressive disorder without prior episode (CMS/[facility])    Labs as noted  Follow heart healthy/low salt diet  Avoid processed foods  Monitor blood pressure as discussed  Exercise as tolerated up to 30 minutes 5 days per week  Take all medications as prescribed  Continue vit D 2000 u a day and vit B12 on MWF  Continue remeron for depression  Continue aricept for dementia  Patient currently lives with her family, they provide support, help feed and bathe her, she is very well cared for    Follow Up:  Return in about 6 months (around 8/25/2020) for Next scheduled follow up.     An After Visit Summary and PPPS were given to the patient.

## 2020-02-28 RX ORDER — DONEPEZIL HYDROCHLORIDE 10 MG/1
TABLET, FILM COATED ORAL
Qty: 60 TABLET | Refills: 5 | Status: SHIPPED | OUTPATIENT
Start: 2020-02-28 | End: 2020-08-26

## 2020-03-19 RX ORDER — LISINOPRIL 10 MG/1
TABLET ORAL
Qty: 90 TABLET | Refills: 0 | Status: SHIPPED | OUTPATIENT
Start: 2020-03-19 | End: 2020-07-20

## 2020-03-19 RX ORDER — AMLODIPINE BESYLATE 2.5 MG/1
TABLET ORAL
Qty: 90 TABLET | Refills: 0 | Status: SHIPPED | OUTPATIENT
Start: 2020-03-19 | End: 2020-06-19

## 2020-04-03 RX ORDER — MIRTAZAPINE 15 MG/1
TABLET, FILM COATED ORAL
Qty: 30 TABLET | Refills: 5 | Status: SHIPPED | OUTPATIENT
Start: 2020-04-03 | End: 2020-04-07

## 2020-04-07 RX ORDER — MIRTAZAPINE 15 MG/1
TABLET, FILM COATED ORAL
Qty: 30 TABLET | Refills: 2 | Status: SHIPPED | OUTPATIENT
Start: 2020-04-07 | End: 2020-10-05

## 2020-06-19 RX ORDER — AMLODIPINE BESYLATE 2.5 MG/1
TABLET ORAL
Qty: 90 TABLET | Refills: 1 | Status: SHIPPED | OUTPATIENT
Start: 2020-06-19 | End: 2020-12-11

## 2020-07-20 RX ORDER — QUETIAPINE FUMARATE 50 MG/1
TABLET, FILM COATED ORAL
Qty: 30 TABLET | Refills: 5 | Status: SHIPPED | OUTPATIENT
Start: 2020-07-20 | End: 2020-08-25

## 2020-07-20 RX ORDER — LISINOPRIL 10 MG/1
TABLET ORAL
Qty: 90 TABLET | Refills: 1 | Status: SHIPPED | OUTPATIENT
Start: 2020-07-20 | End: 2020-12-15 | Stop reason: SDUPTHER

## 2020-08-25 ENCOUNTER — OFFICE VISIT (OUTPATIENT)
Dept: INTERNAL MEDICINE | Facility: CLINIC | Age: 85
End: 2020-08-25

## 2020-08-25 VITALS
TEMPERATURE: 97.9 F | HEART RATE: 80 BPM | BODY MASS INDEX: 21.26 KG/M2 | WEIGHT: 120 LBS | OXYGEN SATURATION: 95 % | DIASTOLIC BLOOD PRESSURE: 70 MMHG | SYSTOLIC BLOOD PRESSURE: 122 MMHG | HEIGHT: 63 IN

## 2020-08-25 DIAGNOSIS — G30.1 LATE ONSET ALZHEIMER'S DISEASE WITH BEHAVIORAL DISTURBANCE (HCC): ICD-10-CM

## 2020-08-25 DIAGNOSIS — I10 ESSENTIAL HYPERTENSION: Primary | ICD-10-CM

## 2020-08-25 DIAGNOSIS — F32.0 CURRENT MILD EPISODE OF MAJOR DEPRESSIVE DISORDER WITHOUT PRIOR EPISODE (HCC): ICD-10-CM

## 2020-08-25 DIAGNOSIS — F02.818 LATE ONSET ALZHEIMER'S DISEASE WITH BEHAVIORAL DISTURBANCE (HCC): ICD-10-CM

## 2020-08-25 PROCEDURE — 99214 OFFICE O/P EST MOD 30 MIN: CPT | Performed by: INTERNAL MEDICINE

## 2020-08-25 RX ORDER — QUETIAPINE FUMARATE 25 MG/1
TABLET, FILM COATED ORAL
Qty: 30 TABLET | Refills: 3 | Status: SHIPPED | OUTPATIENT
Start: 2020-08-25 | End: 2020-12-15

## 2020-08-25 NOTE — PROGRESS NOTES
"Chief Complaint   Patient presents with   • Follow-up     6 months for depression, HTN, and Alzheimer's disease.     Subjective   Adelia Maurice is a 89 y.o. female.     Here today for follow-up of HTN, Alzheimer's disease, depression, allergies and vitamin D/B12 deficiency.  HTN-BP is well controlled today.  Patient denies any CP, SOA, palpitations or edema.  Alzheimer's dementia/depression-she has seen Dr. Zendejas for evaluation of her Alzheimer's dementia.  She is currently on Aricept 10 mg daily and Namenda 10 mg twice daily.  In addition she is on Remeron 15 mg nightly and Seroquel 50 mg in p.m.  She is not eating well.  She is much weaker.  She sleeps all day, about 20 hours a day, she eats and goes back to sleep.  She is unsteady with gait, but is better with using her walker.  She is not talking much, sits with eyes closed.    Vitamin D/B12 deficiency-she remains on vitamin D daily.  She is taking vitamin B 100 mcg on Mondays, Wednesdays and Fridays.       The following portions of the patient's history were reviewed and updated as appropriate: allergies, current medications, past family history, past medical history, past social history, past surgical history and problem list.    Review of Systems   Constitutional: Positive for appetite change and fatigue.   HENT: Negative.    Eyes: Negative.    Respiratory: Negative.    Cardiovascular: Negative.    Gastrointestinal: Negative.    Endocrine: Negative.    Genitourinary: Negative.    Musculoskeletal: Positive for arthralgias, back pain, gait problem and neck pain.   Skin: Negative.    Allergic/Immunologic: Negative.    Neurological: Positive for weakness.   Hematological: Negative.    Psychiatric/Behavioral: Positive for confusion and sleep disturbance.       Objective   /70   Pulse 80   Temp 97.9 °F (36.6 °C)   Ht 160 cm (63\")   Wt 54.4 kg (120 lb)   SpO2 95%   BMI 21.26 kg/m²   Body mass index is 21.26 kg/m².  Physical Exam   Constitutional: " She appears well-developed and well-nourished. No distress.   Elderly female, appears her age, wearing a mask, she is moaning and keeps her eyes closed throughout our visit today   HENT:   Head: Normocephalic and atraumatic.   Right Ear: External ear normal.   Left Ear: External ear normal.   Eyes: EOM are normal. Right eye exhibits no discharge. Left eye exhibits no discharge.   Neck: Neck supple. No thyromegaly present.   Patient keeps head slightly flexed forward and has difficulty extending back   Cardiovascular: Normal rate, regular rhythm, normal heart sounds and intact distal pulses.   Systolic murmur grade 1/6 heard over precordium   Pulmonary/Chest: Effort normal and breath sounds normal. No respiratory distress. She has no wheezes.   Musculoskeletal: She exhibits no edema.   Lymphadenopathy:     She has no cervical adenopathy.   Neurological: She is alert. No cranial nerve deficit. Coordination normal.   Patient requires arm held assistance during ambulation due to diffuse muscle weakness  She requires help getting up from seated position due to hip flexor weakness   Psychiatric:   Son answers a majority of questions for patient as she sits quietly during questioning and ignores most of my questions.  She does answer questions appropriately but only yes or no questions   Nursing note and vitals reviewed.      Assessment/Plan   Adelia Maurice is here today and the following problems have been addressed:      Adelia was seen today for follow-up.    Diagnoses and all orders for this visit:    Essential hypertension    Late onset Alzheimer's disease with behavioral disturbance (CMS/HCC)    Current mild episode of major depressive disorder without prior episode (CMS/HCC)    Other orders  -     QUEtiapine (SEROquel) 25 MG tablet; May take 1 tab in AM and in PM prn if still agitated    Follow heart healthy/low salt diet  Monitor blood pressure on occasion  Take all medications as prescribed  Decrease Seroquel to  25 mg nightly due to somnolence throughout the day  Discontinue melatonin  Discontinue Aricept and Namenda.  Family will discuss discontinuing these medications.  I explained to son that her dementia is at the point where medications likely are not helping her.  She sits throughout the day and sleeps the majority of the time.  Son states that he does recognize family but does not interact with them and rarely talks.  I explained to him that these medications are to help keep dementia at baseline but will not improve her memory any further.  He will likely discontinue Aricept and they will consider discontinuing Namenda  Continue Remeron for underlying depression and anxiety  She had labs drawn today from February visit    Return in about 4 months (around 12/25/2020) for video visit.      Marilyn K. Vermeesch, MD      Please note that portions of this note were completed with a voice recognition program.  Efforts were made to edit dictation, but occasionally words are mistranscribed.

## 2020-08-26 LAB
ALBUMIN SERPL-MCNC: 4.5 G/DL (ref 3.5–5.2)
ALBUMIN/GLOB SERPL: 1.9 G/DL
ALP SERPL-CCNC: 91 U/L (ref 39–117)
ALT SERPL-CCNC: <5 U/L (ref 1–33)
AST SERPL-CCNC: 15 U/L (ref 1–32)
BASOPHILS # BLD AUTO: 0.04 10*3/MM3 (ref 0–0.2)
BASOPHILS NFR BLD AUTO: 0.6 % (ref 0–1.5)
BILIRUB SERPL-MCNC: 0.4 MG/DL (ref 0–1.2)
BUN SERPL-MCNC: 20 MG/DL (ref 8–23)
BUN/CREAT SERPL: 23.3 (ref 7–25)
CALCIUM SERPL-MCNC: 9.6 MG/DL (ref 8.2–9.6)
CHLORIDE SERPL-SCNC: 100 MMOL/L (ref 98–107)
CHOLEST SERPL-MCNC: 264 MG/DL (ref 0–200)
CHOLEST/HDLC SERPL: 4.89 {RATIO}
CO2 SERPL-SCNC: 28.3 MMOL/L (ref 22–29)
CREAT SERPL-MCNC: 0.86 MG/DL (ref 0.57–1)
EOSINOPHIL # BLD AUTO: 0.22 10*3/MM3 (ref 0–0.4)
EOSINOPHIL NFR BLD AUTO: 3.1 % (ref 0.3–6.2)
ERYTHROCYTE [DISTWIDTH] IN BLOOD BY AUTOMATED COUNT: 12.6 % (ref 12.3–15.4)
GLOBULIN SER CALC-MCNC: 2.4 GM/DL
GLUCOSE SERPL-MCNC: 89 MG/DL (ref 65–99)
HCT VFR BLD AUTO: 42.4 % (ref 34–46.6)
HDLC SERPL-MCNC: 54 MG/DL (ref 40–60)
HGB BLD-MCNC: 13.3 G/DL (ref 12–15.9)
IMM GRANULOCYTES # BLD AUTO: 0.01 10*3/MM3 (ref 0–0.05)
IMM GRANULOCYTES NFR BLD AUTO: 0.1 % (ref 0–0.5)
LDLC SERPL CALC-MCNC: 187 MG/DL (ref 0–100)
LYMPHOCYTES # BLD AUTO: 2.88 10*3/MM3 (ref 0.7–3.1)
LYMPHOCYTES NFR BLD AUTO: 41.2 % (ref 19.6–45.3)
MCH RBC QN AUTO: 30.2 PG (ref 26.6–33)
MCHC RBC AUTO-ENTMCNC: 31.4 G/DL (ref 31.5–35.7)
MCV RBC AUTO: 96.1 FL (ref 79–97)
MONOCYTES # BLD AUTO: 0.48 10*3/MM3 (ref 0.1–0.9)
MONOCYTES NFR BLD AUTO: 6.9 % (ref 5–12)
NEUTROPHILS # BLD AUTO: 3.36 10*3/MM3 (ref 1.7–7)
NEUTROPHILS NFR BLD AUTO: 48.1 % (ref 42.7–76)
NRBC BLD AUTO-RTO: 0 /100 WBC (ref 0–0.2)
PLATELET # BLD AUTO: 262 10*3/MM3 (ref 140–450)
POTASSIUM SERPL-SCNC: 3.9 MMOL/L (ref 3.5–5.2)
PROT SERPL-MCNC: 6.9 G/DL (ref 6–8.5)
RBC # BLD AUTO: 4.41 10*6/MM3 (ref 3.77–5.28)
SODIUM SERPL-SCNC: 142 MMOL/L (ref 136–145)
TRIGL SERPL-MCNC: 114 MG/DL (ref 0–150)
VLDLC SERPL CALC-MCNC: 22.8 MG/DL
WBC # BLD AUTO: 6.99 10*3/MM3 (ref 3.4–10.8)

## 2020-09-24 ENCOUNTER — TELEPHONE (OUTPATIENT)
Dept: INTERNAL MEDICINE | Facility: CLINIC | Age: 85
End: 2020-09-24

## 2020-09-24 NOTE — TELEPHONE ENCOUNTER
PATIENT'S POA IS REQUESTING A CALL BACK FROM CLINICAL TEAM MEMBER REGARDING PATIENT'S CHANGE IN CONDITION CONCERNING RECENT CHANGE OF MEDICATION.    CALLBACK NUMBER IS   [phone]

## 2020-09-24 NOTE — TELEPHONE ENCOUNTER
Spoke with Denny, states since discontinuing Aricept and Namenda and decreasing Seroquel Pt has been staying awake more but she just sits and cries.

## 2020-09-30 NOTE — TELEPHONE ENCOUNTER
Attempted contacting Pt's son, no answer. Left detailed VM for him to return call with sxs and medication name. Advised that if Pt is having difficulty breathing then she needs to go to ER  
PATIENTS SON CALLED REQUESTING A CALL BACK IN REGARDS TO THE PATIENTS POSSIBLE SIDE AFFECTS TO HER MEDICATION.   
Yes

## 2020-10-05 RX ORDER — MIRTAZAPINE 15 MG/1
TABLET, FILM COATED ORAL
Qty: 30 TABLET | Refills: 4 | Status: SHIPPED | OUTPATIENT
Start: 2020-10-05 | End: 2021-04-23 | Stop reason: SDUPTHER

## 2020-11-10 ENCOUNTER — TELEPHONE (OUTPATIENT)
Dept: INTERNAL MEDICINE | Facility: CLINIC | Age: 85
End: 2020-11-10

## 2020-11-10 NOTE — TELEPHONE ENCOUNTER
Patient's son called to see if the patient has gotten her flu shot and if not when she can come in and get one.    Patient;s son callback:  [phone]    Please advise

## 2020-11-13 ENCOUNTER — FLU SHOT (OUTPATIENT)
Dept: INTERNAL MEDICINE | Facility: CLINIC | Age: 85
End: 2020-11-13

## 2020-11-13 DIAGNOSIS — Z23 NEED FOR INFLUENZA VACCINATION: ICD-10-CM

## 2020-11-13 PROCEDURE — G0008 ADMIN INFLUENZA VIRUS VAC: HCPCS | Performed by: INTERNAL MEDICINE

## 2020-11-13 PROCEDURE — 90694 VACC AIIV4 NO PRSRV 0.5ML IM: CPT | Performed by: INTERNAL MEDICINE

## 2020-12-11 RX ORDER — AMLODIPINE BESYLATE 2.5 MG/1
TABLET ORAL
Qty: 90 TABLET | Refills: 3 | Status: SHIPPED | OUTPATIENT
Start: 2020-12-11 | End: 2021-11-19

## 2020-12-15 ENCOUNTER — OFFICE VISIT (OUTPATIENT)
Dept: INTERNAL MEDICINE | Facility: CLINIC | Age: 85
End: 2020-12-15

## 2020-12-15 VITALS
DIASTOLIC BLOOD PRESSURE: 79 MMHG | OXYGEN SATURATION: 97 % | HEART RATE: 73 BPM | TEMPERATURE: 97.2 F | SYSTOLIC BLOOD PRESSURE: 187 MMHG

## 2020-12-15 DIAGNOSIS — G30.1 LATE ONSET ALZHEIMER'S DISEASE WITH BEHAVIORAL DISTURBANCE (HCC): ICD-10-CM

## 2020-12-15 DIAGNOSIS — F32.0 CURRENT MILD EPISODE OF MAJOR DEPRESSIVE DISORDER WITHOUT PRIOR EPISODE (HCC): ICD-10-CM

## 2020-12-15 DIAGNOSIS — F02.818 LATE ONSET ALZHEIMER'S DISEASE WITH BEHAVIORAL DISTURBANCE (HCC): ICD-10-CM

## 2020-12-15 DIAGNOSIS — I10 ESSENTIAL HYPERTENSION: Primary | ICD-10-CM

## 2020-12-15 PROCEDURE — G2025 DIS SITE TELE SVCS RHC/FQHC: HCPCS | Performed by: INTERNAL MEDICINE

## 2020-12-15 RX ORDER — QUETIAPINE FUMARATE 25 MG/1
TABLET, FILM COATED ORAL
Qty: 30 TABLET | Refills: 5 | Status: SHIPPED | OUTPATIENT
Start: 2020-12-15 | End: 2021-04-23 | Stop reason: SDUPTHER

## 2020-12-15 RX ORDER — LISINOPRIL 10 MG/1
10 TABLET ORAL DAILY
Qty: 90 TABLET | Refills: 1 | Status: SHIPPED | OUTPATIENT
Start: 2020-12-15 | End: 2021-04-23 | Stop reason: SDUPTHER

## 2020-12-15 NOTE — PROGRESS NOTES
Chief Complaint   Patient presents with   • Follow-up     4 months for depression, HTN, and Alzheimer's disease.     Subjective   Adelia Maurice is a 89 y.o. female.     Telemedicine phone visit today for follow-up of HTN, Alzheimer's disease, depression, allergies and vitamin D/B12 deficiency.  HTN- Patient denies any CP, SOA, palpitations, HA or edema. Her home BP read is elevated today but home machine has a low battery.  Her son will replace batteries and call me back later with repeat blood pressure reading.  Alzheimer's dementia/depression- her son states she is forgetting some family member that she does not see regularly.  She is currently on Aricept 10 mg daily and Namenda 10 mg twice daily.  In addition she is on Remeron 15 mg nightly and Seroquel 50 mg in p.m.  She is eating well.  She sleeps a lot, dozes all day in her chair.  She is unsteady with gait, but is better with using her walker.  Needs to be coached thru using the restroom. Forgets where she is at night.  She sleeps well at night unless she needs to urinate.     Vitamin D/B12 deficiency-she remains on vitamin D daily.  She is taking vitamin B 100 mcg on Mondays, Wednesdays and Fridays.  She is getting shots for her macular degeneration and is on drops for glaucoma.        The following portions of the patient's history were reviewed and updated as appropriate: allergies, current medications, past family history, past medical history, past social history, past surgical history and problem list.    Review of Systems    Objective   BP (!) 187/79   Pulse 73   Temp 97.2 °F (36.2 °C)   SpO2 97%   There is no height or weight on file to calculate BMI.  Physical Exam   No physical exam today, phone visit only    Assessment/Plan   Adelia Maurice is here today and the following problems have been addressed:      Diagnoses and all orders for this visit:    1. Essential hypertension (Primary)    2. Late onset Alzheimer's disease with behavioral  disturbance (CMS/HCC)    3. Current mild episode of major depressive disorder without prior episode (CMS/HCC)    Other orders  -     lisinopril (PRINIVIL,ZESTRIL) 10 MG tablet; Take 1 tablet by mouth Daily.  Dispense: 90 tablet; Refill: 1      Continue current blood pressure medications  Son will repeat blood pressure reading after he changes batteries and monitor and contact me with repeat blood pressure later today  Follow heart healthy diet, avoid salt and processed foods  Continue all current medication for Alzheimer's disease, dementia is slowly progressing as expected  Her depression is currently stable on Remeron 15 mg daily  She is sleeping well at night per son, no need for medication adjustment of Seroquel  Labs are currently up-to-date  She has follow-up with her eye doctor regarding underlying macular degeneration and glaucoma  Encouragement and reassurance given to her son today as he and his wife are primary caregivers for patient and it is becoming more difficult for them    Return in about 4 months (around 4/15/2021) for Next scheduled follow up.     Time devoted to visit: 22 minutes including time to review previous record, with 75% of time devoted to direct discussion      Marilyn K. Vermeesch, MD      Please note that portions of this note were completed with a voice recognition program.  Efforts were made to edit dictation, but occasionally words are mistranscribed.You have chosen to receive care through a telephone visit. Do you consent to use a telephone visit for your medical care today? Yes  Active Parties: Marilyn Vermeesch (PCP), Tara Whipple (CMA), Denny (Pt's son) and (Pt) Adelia.

## 2021-01-21 ENCOUNTER — TELEPHONE (OUTPATIENT)
Dept: INTERNAL MEDICINE | Facility: CLINIC | Age: 86
End: 2021-01-21

## 2021-01-21 RX ORDER — MEMANTINE HYDROCHLORIDE 5 MG/1
5 TABLET ORAL 2 TIMES DAILY
Qty: 60 TABLET | Refills: 5 | Status: SHIPPED | OUTPATIENT
Start: 2021-01-21 | End: 2021-01-28 | Stop reason: SDUPTHER

## 2021-01-21 NOTE — TELEPHONE ENCOUNTER
Please tell him I have sent in Namenda for her.  She was given 5 mg to take twice daily, however she is to take only 1 tablet at night for 1 week, then increase to twice daily dosing.

## 2021-01-21 NOTE — TELEPHONE ENCOUNTER
PATIENT'S POA CALLED IN WANTING TO KNOW IF HER PCP COULD PRESCRIBE MEMANTINE. HE SAID THAT SHE HAS TAKEN IT BEFORE AND WOULD LIKE TO HAVE HER ON IT AGAIN. HE WOULD LIKE TO HAVE SOMEONE CALL HIM.     CONTACT: [phone]

## 2021-01-28 RX ORDER — MEMANTINE HYDROCHLORIDE 10 MG/1
10 TABLET ORAL 2 TIMES DAILY
Qty: 60 TABLET | Refills: 5 | Status: SHIPPED | OUTPATIENT
Start: 2021-01-28 | End: 2021-04-23 | Stop reason: SDUPTHER

## 2021-01-28 NOTE — TELEPHONE ENCOUNTER
Pt was prescribed 10 mg by Dr. Zendejas on 1/3/20. Son requested refill for you last week and 5 mg was sent in. Script pended for 10 mg.

## 2021-01-28 NOTE — TELEPHONE ENCOUNTER
PTS SON CALLED REQUESTING A CALL BACK REGARDING PTS MEDICATION    HE STATED HE DOES NOT KNOW HOW MUCH PT IS SUPPOSED TO BE TAKING OF:  memantine (Namenda) 5 MG tablet    HE STATED OLD SCRIPT WAS 10MG TWICE A DAY     HE STATED THE NEW SCRIPT IS 5MG TWICE A DAY    PLEASE ADVISE AT: [phone]

## 2021-04-23 ENCOUNTER — OFFICE VISIT (OUTPATIENT)
Dept: INTERNAL MEDICINE | Facility: CLINIC | Age: 86
End: 2021-04-23

## 2021-04-23 VITALS
OXYGEN SATURATION: 94 % | HEART RATE: 88 BPM | SYSTOLIC BLOOD PRESSURE: 132 MMHG | BODY MASS INDEX: 24.1 KG/M2 | DIASTOLIC BLOOD PRESSURE: 70 MMHG | WEIGHT: 136 LBS | HEIGHT: 63 IN | TEMPERATURE: 97 F

## 2021-04-23 DIAGNOSIS — F02.818 LATE ONSET ALZHEIMER'S DISEASE WITH BEHAVIORAL DISTURBANCE (HCC): ICD-10-CM

## 2021-04-23 DIAGNOSIS — G30.1 LATE ONSET ALZHEIMER'S DISEASE WITH BEHAVIORAL DISTURBANCE (HCC): ICD-10-CM

## 2021-04-23 DIAGNOSIS — E53.8 VITAMIN B 12 DEFICIENCY: ICD-10-CM

## 2021-04-23 DIAGNOSIS — I10 ESSENTIAL HYPERTENSION: ICD-10-CM

## 2021-04-23 DIAGNOSIS — F32.0 CURRENT MILD EPISODE OF MAJOR DEPRESSIVE DISORDER WITHOUT PRIOR EPISODE (HCC): ICD-10-CM

## 2021-04-23 DIAGNOSIS — Z00.00 ENCOUNTER FOR MEDICARE ANNUAL WELLNESS EXAM: Primary | ICD-10-CM

## 2021-04-23 PROCEDURE — G0439 PPPS, SUBSEQ VISIT: HCPCS | Performed by: INTERNAL MEDICINE

## 2021-04-23 RX ORDER — MEMANTINE HYDROCHLORIDE 10 MG/1
10 TABLET ORAL 2 TIMES DAILY
Qty: 180 TABLET | Refills: 1 | Status: SHIPPED | OUTPATIENT
Start: 2021-04-23 | End: 2022-02-10

## 2021-04-23 RX ORDER — QUETIAPINE FUMARATE 25 MG/1
TABLET, FILM COATED ORAL
Qty: 180 TABLET | Refills: 1 | Status: SHIPPED | OUTPATIENT
Start: 2021-04-23 | End: 2022-05-13

## 2021-04-23 RX ORDER — MIRTAZAPINE 15 MG/1
15 TABLET, FILM COATED ORAL NIGHTLY
Qty: 90 TABLET | Refills: 1 | Status: SHIPPED | OUTPATIENT
Start: 2021-04-23 | End: 2021-08-05

## 2021-04-23 RX ORDER — LISINOPRIL 10 MG/1
10 TABLET ORAL DAILY
Qty: 90 TABLET | Refills: 1 | Status: SHIPPED | OUTPATIENT
Start: 2021-04-23 | End: 2022-01-20

## 2021-04-23 NOTE — PROGRESS NOTES
The ABCs of the Annual Wellness Visit  Subsequent Medicare Wellness Visit    Chief Complaint   Patient presents with   • Medicare Wellness-subsequent     Son states Pt grunts with almost every breath and has been biting her finger nails.       Subjective   History of Present Illness:  Adelia Maurice is a 89 y.o. female who presents for a Subsequent Medicare Wellness Visit.  PMH of HTN, vitamin B12/D deficiency, macular degeneration, dementia, depression, arthritis.  She has had her seasonal flu vaccine, both pneumococcal vaccines and Covid vaccine.  BP and heart rate are well controlled today.  Son states that patient has been grunting with almost every breath, but does not seem SOA.  Her oxygen saturation is slightly low today.  He also complains that she has been biting her fingernails.  She is on Remeron for underlying depression and quetiapine to help with agitation and sleep later in the day.  Her appetite has been better and she has gained weight since last visit. She goes to sleep at 2100 and she sleeps all night until 1000.  She naps all day.     HEALTH RISK ASSESSMENT    Recent Hospitalizations:  No hospitalization(s) within the last year.    Current Medical Providers:  Patient Care Team:  Vermeesch, Marilyn K, MD as PCP - General (Internal Medicine & Pediatrics)    Smoking Status:  Social History     Tobacco Use   Smoking Status Never Smoker   Smokeless Tobacco Never Used       Alcohol Consumption:  Social History     Substance and Sexual Activity   Alcohol Use No       Depression Screen:   PHQ-2/PHQ-9 Depression Screening 4/23/2021   Little interest or pleasure in doing things 0   Feeling down, depressed, or hopeless 1   Trouble falling or staying asleep, or sleeping too much -   Feeling tired or having little energy -   Poor appetite or overeating -   Feeling bad about yourself - or that you are a failure or have let yourself or your family down -   Trouble concentrating on things, such as reading the  newspaper or watching television -   Moving or speaking so slowly that other people could have noticed. Or the opposite - being so fidgety or restless that you have been moving around a lot more than usual -   Thoughts that you would be better off dead, or of hurting yourself in some way -   Total Score 1   If you checked off any problems, how difficult have these problems made it for you to do your work, take care of things at home, or get along with other people? -       Fall Risk Screen:  ANAYA Fall Risk Assessment was completed, and patient is at MODERATE risk for falls. Assessment completed on:4/23/2021    Health Habits and Functional and Cognitive Screening:  Functional & Cognitive Status 4/23/2021   Do you have difficulty preparing food and eating? Yes   Do you have difficulty bathing yourself, getting dressed or grooming yourself? Yes   Do you have difficulty using the toilet? No   Do you have difficulty moving around from place to place? Yes   Do you have trouble with steps or getting out of a bed or a chair? Yes   Current Diet Well Balanced Diet   Dental Exam Unknown   Eye Exam Up to date   Exercise (times per week) 0 times per week   Current Exercise Activities Include None   Do you need help using the phone?  Yes   Are you deaf or do you have serious difficulty hearing?  Yes   Do you need help with transportation? Yes   Do you need help shopping? Yes   Do you need help preparing meals?  Yes   Do you need help with housework?  Yes   Do you need help with laundry? Yes   Do you need help taking your medications? Yes   Do you need help managing money? Yes   Do you ever drive or ride in a car without wearing a seat belt? No   Have you felt unusual stress, anger or loneliness in the last month? No   Who do you live with? Child   If you need help, do you have trouble finding someone available to you? No   Do you have difficulty concentrating, remembering or making decisions? Yes         Does the patient have  evidence of cognitive impairment? Yes    Asprin use counseling:Taking ASA appropriately as indicated    Age-appropriate Screening Schedule:  Refer to the list below for future screening recommendations based on patient's age, sex and/or medical conditions. Orders for these recommended tests are listed in the plan section. The patient has been provided with a written plan.    Health Maintenance   Topic Date Due   • DXA SCAN  04/23/2021 (Originally 4/29/1922)   • INFLUENZA VACCINE  08/01/2021   • MAMMOGRAM  Discontinued   • TDAP/TD VACCINES  Discontinued   • ZOSTER VACCINE  Discontinued          The following portions of the patient's history were reviewed and updated as appropriate: allergies, current medications, past family history, past medical history, past social history, past surgical history and problem list.    Outpatient Medications Prior to Visit   Medication Sig Dispense Refill   • albuterol sulfate  (90 Base) MCG/ACT inhaler Inhale 2 puffs Every 4 (Four) Hours As Needed for Wheezing or Shortness of Air. 1 inhaler 0   • amLODIPine (NORVASC) 2.5 MG tablet TAKE ONE TABLET BY MOUTH DAILY 90 tablet 3   • aspirin 81 MG tablet Take 1 tablet by mouth Daily. Take one tab every other day 30 tablet 0   • cholecalciferol (VITAMIN D3) 1000 units tablet Take 2,000 Units by mouth Daily.     • latanoprost (XALATAN) 0.005 % ophthalmic solution Administer 1 drop to both eyes every night.     • Latanoprost 0.005 % emulsion latanoprost 0.005 % eye drops   INSTILL 1 DROP IN EACH EYE EVERY NIGHT AT BEDTIME     • melatonin 3 MG tablet Take 2 tablets by mouth Every Night. 60 tablet 0   • Multiple Vitamins-Minerals (PRESERVISION AREDS PO) Take  by mouth 2 (Two) Times a Day.     • vitamin B-12 (CYANOCOBALAMIN) 100 MCG tablet Take 10 mcg by mouth 3 (Three) Times a Week. Takes Monday, Wednesday and friday     • lisinopril (PRINIVIL,ZESTRIL) 10 MG tablet Take 1 tablet by mouth Daily. 90 tablet 1   • memantine (NAMENDA) 10 MG  "tablet Take 1 tablet by mouth 2 (Two) Times a Day. 60 tablet 5   • mirtazapine (REMERON) 15 MG tablet TAKE ONE TABLET BY MOUTH ONCE NIGHTLY 30 tablet 4   • QUEtiapine (SEROquel) 25 MG tablet TAKE ONE TABLET BY MOUTH EVERY MORNING AND IN THE EVENING IF STILL AGITATED 30 tablet 5     No facility-administered medications prior to visit.       Patient Active Problem List   Diagnosis   • Depression   • Essential hypertension   • Neuralgia   • Macular degeneration   • Arthritis of hip   • Carotid bruit   • Fatigue   • BMI 29.0-29.9,adult   • Environmental allergies   • Vitamin D deficiency   • Vitamin B 12 deficiency   • Late onset Alzheimer's disease with behavioral disturbance (CMS/[facility])   • Encounter for Medicare annual wellness exam       Advanced Care Planning:  ACP discussion was held with the patient during this visit. Patient has an advance directive in EMR which is still valid.     Review of Systems   Constitutional: Negative.    HENT: Positive for postnasal drip and rhinorrhea.    Eyes: Negative.    Respiratory: Negative.    Cardiovascular: Negative.    Gastrointestinal: Negative.    Endocrine: Negative.    Genitourinary: Negative.    Musculoskeletal: Positive for arthralgias.   Skin: Positive for color change and rash.        Rash under breast- using vagisil   Allergic/Immunologic: Negative.    Neurological: Negative.    Hematological: Negative.    Psychiatric/Behavioral: Positive for confusion and dysphoric mood. Negative for sleep disturbance.       Compared to one year ago, the patient feels her physical health is worse.  Compared to one year ago, the patient feels her mental health is the same.    Reviewed chart for potential of high risk medication in the elderly: yes  Reviewed chart for potential of harmful drug interactions in the elderly:yes    Objective         Vitals:    04/23/21 1003   BP: 132/70   Pulse: 88   Temp: 97 °F (36.1 °C)   SpO2: 94%   Weight: 61.7 kg (136 lb)   Height: 160 cm (63\") "   PainSc:   2       Body mass index is 24.09 kg/m².  Discussed the patient's BMI with her. The BMI is in the acceptable range.    Physical Exam  Vitals and nursing note reviewed.   Constitutional:       General: She is not in acute distress.     Appearance: Normal appearance. She is well-developed. She is not ill-appearing.      Comments: Pleasant elderly female, seated in wheelchair and in no distress today   HENT:      Head: Normocephalic and atraumatic.      Right Ear: Tympanic membrane, ear canal and external ear normal.      Left Ear: Tympanic membrane, ear canal and external ear normal.   Eyes:      General:         Right eye: No discharge.         Left eye: No discharge.      Extraocular Movements: Extraocular movements intact.      Conjunctiva/sclera: Conjunctivae normal.      Pupils: Pupils are equal, round, and reactive to light.   Neck:      Thyroid: No thyromegaly.   Cardiovascular:      Rate and Rhythm: Normal rate and regular rhythm.      Pulses: Normal pulses.      Heart sounds: Murmur heard.        Comments: No carotid bruits  Systolic murmur grade 1/6 heard over precordium  Pulmonary:      Effort: Pulmonary effort is normal. No respiratory distress.      Breath sounds: Normal breath sounds. No wheezing.   Abdominal:      General: Bowel sounds are normal. There is no distension.      Palpations: Abdomen is soft.      Tenderness: There is no abdominal tenderness.   Musculoskeletal:         General: Normal range of motion.      Cervical back: Normal range of motion and neck supple.      Right lower leg: No edema.      Left lower leg: No edema.   Lymphadenopathy:      Cervical: No cervical adenopathy.   Skin:     General: Skin is warm.      Findings: No rash.      Comments: Fingernails on left hand are chewed down to the quick, nails are normal on right hand   Neurological:      General: No focal deficit present.      Mental Status: She is alert. Mental status is at baseline.      Cranial Nerves: No  cranial nerve deficit.      Motor: Weakness present.      Coordination: Coordination normal.      Gait: Gait abnormal.      Comments: Not ambulatory today, seated in wheelchair   Psychiatric:         Behavior: Behavior normal.         Thought Content: Thought content normal.      Comments: Pleasant mood, answer simple questions appropriately               Assessment/Plan   Medicare Risks and Personalized Health Plan  CMS Preventative Services Quick Reference  Cardiovascular risk  Dementia/Memory   Diabetic Lab Screening   Inactivity/Sedentary    The above risks/problems have been discussed with the patient.  Pertinent information has been shared with the patient in the After Visit Summary.  Follow up plans and orders are seen below in the Assessment/Plan Section.    Diagnoses and all orders for this visit:    1. Encounter for Medicare annual wellness exam (Primary)  -     CBC & Differential  -     Comprehensive Metabolic Panel  -     Lipid Panel With / Chol / HDL Ratio  -     TSH  -     Vitamin B12    2. Essential hypertension  -     CBC & Differential  -     Comprehensive Metabolic Panel  -     Lipid Panel With / Chol / HDL Ratio    3. Current mild episode of major depressive disorder without prior episode (CMS/[facility])  -     TSH    4. Late onset Alzheimer's disease with behavioral disturbance (CMS/[facility])  -     TSH    5. Vitamin B 12 deficiency  -     Vitamin B12    Other orders  -     QUEtiapine (SEROquel) 25 MG tablet; TAKE ONE TABLET BY MOUTH EVERY MORNING AND IN THE EVENING IF STILL AGITATED  Dispense: 180 tablet; Refill: 1  -     mirtazapine (REMERON) 15 MG tablet; Take 1 tablet by mouth Every Night.  Dispense: 90 tablet; Refill: 1  -     memantine (NAMENDA) 10 MG tablet; Take 1 tablet by mouth 2 (Two) Times a Day.  Dispense: 180 tablet; Refill: 1  -     lisinopril (PRINIVIL,ZESTRIL) 10 MG tablet; Take 1 tablet by mouth Daily.  Dispense: 90 tablet; Refill: 1    Labs as noted  Continue all current  medications  Patient is being cared for by her family and overall is doing well  Her dementia and depression are well controlled with mirtazapine, Namenda and Seroquel  She is sleeping a majority of the day but is not agitated with above medications  Her blood pressure is well controlled on current medication  She has had her Covid vaccine  Most pressing problem with her right now is that she is chewing fingernails, family will try to get an over-the-counter nail polish or other nail cover that does not taste good and will deter her from chewing nails    Follow Up:  Return in about 6 months (around 10/23/2021) for Next scheduled follow up.     An After Visit Summary and PPPS were given to the patient.

## 2021-05-01 LAB
ALBUMIN SERPL-MCNC: 3.7 G/DL (ref 3.5–5.2)
ALBUMIN/GLOB SERPL: 1.4 G/DL
ALP SERPL-CCNC: 76 U/L (ref 39–117)
ALT SERPL-CCNC: <5 U/L (ref 1–33)
AST SERPL-CCNC: 11 U/L (ref 1–32)
BASOPHILS # BLD AUTO: 0.02 10*3/MM3 (ref 0–0.2)
BASOPHILS NFR BLD AUTO: 0.3 % (ref 0–1.5)
BILIRUB SERPL-MCNC: 0.3 MG/DL (ref 0–1.2)
BUN SERPL-MCNC: 23 MG/DL (ref 8–23)
BUN/CREAT SERPL: 27.7 (ref 7–25)
CALCIUM SERPL-MCNC: 9.3 MG/DL (ref 8.2–9.6)
CHLORIDE SERPL-SCNC: 108 MMOL/L (ref 98–107)
CHOLEST SERPL-MCNC: 192 MG/DL (ref 0–200)
CHOLEST/HDLC SERPL: 4.57 {RATIO}
CO2 SERPL-SCNC: 27.9 MMOL/L (ref 22–29)
CREAT SERPL-MCNC: 0.83 MG/DL (ref 0.57–1)
EOSINOPHIL # BLD AUTO: 0.22 10*3/MM3 (ref 0–0.4)
EOSINOPHIL NFR BLD AUTO: 3.8 % (ref 0.3–6.2)
ERYTHROCYTE [DISTWIDTH] IN BLOOD BY AUTOMATED COUNT: 12.6 % (ref 12.3–15.4)
GLOBULIN SER CALC-MCNC: 2.7 GM/DL
GLUCOSE SERPL-MCNC: 87 MG/DL (ref 65–99)
HCT VFR BLD AUTO: 36.3 % (ref 34–46.6)
HDLC SERPL-MCNC: 42 MG/DL (ref 40–60)
HGB BLD-MCNC: 12 G/DL (ref 12–15.9)
IMM GRANULOCYTES # BLD AUTO: 0.02 10*3/MM3 (ref 0–0.05)
IMM GRANULOCYTES NFR BLD AUTO: 0.3 % (ref 0–0.5)
LDLC SERPL CALC-MCNC: 134 MG/DL (ref 0–100)
LYMPHOCYTES # BLD AUTO: 1.89 10*3/MM3 (ref 0.7–3.1)
LYMPHOCYTES NFR BLD AUTO: 32.5 % (ref 19.6–45.3)
MCH RBC QN AUTO: 31.1 PG (ref 26.6–33)
MCHC RBC AUTO-ENTMCNC: 33.1 G/DL (ref 31.5–35.7)
MCV RBC AUTO: 94 FL (ref 79–97)
MONOCYTES # BLD AUTO: 0.47 10*3/MM3 (ref 0.1–0.9)
MONOCYTES NFR BLD AUTO: 8.1 % (ref 5–12)
NEUTROPHILS # BLD AUTO: 3.2 10*3/MM3 (ref 1.7–7)
NEUTROPHILS NFR BLD AUTO: 55 % (ref 42.7–76)
NRBC BLD AUTO-RTO: 0 /100 WBC (ref 0–0.2)
PLATELET # BLD AUTO: 269 10*3/MM3 (ref 140–450)
POTASSIUM SERPL-SCNC: 4.5 MMOL/L (ref 3.5–5.2)
PROT SERPL-MCNC: 6.4 G/DL (ref 6–8.5)
RBC # BLD AUTO: 3.86 10*6/MM3 (ref 3.77–5.28)
SODIUM SERPL-SCNC: 144 MMOL/L (ref 136–145)
TRIGL SERPL-MCNC: 89 MG/DL (ref 0–150)
TSH SERPL DL<=0.005 MIU/L-ACNC: 6.91 UIU/ML (ref 0.27–4.2)
VIT B12 SERPL-MCNC: 628 PG/ML (ref 211–946)
VLDLC SERPL CALC-MCNC: 16 MG/DL (ref 5–40)
WBC # BLD AUTO: 5.82 10*3/MM3 (ref 3.4–10.8)

## 2021-05-03 RX ORDER — LEVOTHYROXINE SODIUM 0.03 MG/1
25 TABLET ORAL DAILY
Qty: 90 TABLET | Refills: 3 | Status: SHIPPED | OUTPATIENT
Start: 2021-05-03 | End: 2021-10-26

## 2021-08-05 RX ORDER — MIRTAZAPINE 15 MG/1
TABLET, FILM COATED ORAL
Qty: 90 TABLET | Refills: 1 | Status: SHIPPED | OUTPATIENT
Start: 2021-08-05 | End: 2022-01-31

## 2021-09-17 ENCOUNTER — TELEPHONE (OUTPATIENT)
Dept: INTERNAL MEDICINE | Facility: CLINIC | Age: 86
End: 2021-09-17

## 2021-09-17 NOTE — TELEPHONE ENCOUNTER
Caller: Josafat(LEONIDAS)Roberto    Relationship: Emergency Contact    Best call back number: [phone] (M)    What medications are you currently taking:   Current Outpatient Medications on File Prior to Visit   Medication Sig Dispense Refill   • albuterol sulfate  (90 Base) MCG/ACT inhaler Inhale 2 puffs Every 4 (Four) Hours As Needed for Wheezing or Shortness of Air. 1 inhaler 0   • amLODIPine (NORVASC) 2.5 MG tablet TAKE ONE TABLET BY MOUTH DAILY 90 tablet 3   • aspirin 81 MG tablet Take 1 tablet by mouth Daily. Take one tab every other day 30 tablet 0   • cholecalciferol (VITAMIN D3) 1000 units tablet Take 2,000 Units by mouth Daily.     • latanoprost (XALATAN) 0.005 % ophthalmic solution Administer 1 drop to both eyes every night.     • Latanoprost 0.005 % emulsion latanoprost 0.005 % eye drops   INSTILL 1 DROP IN EACH EYE EVERY NIGHT AT BEDTIME     • levothyroxine (SYNTHROID, LEVOTHROID) 25 MCG tablet Take 1 tablet by mouth Daily. 90 tablet 3   • lisinopril (PRINIVIL,ZESTRIL) 10 MG tablet Take 1 tablet by mouth Daily. 90 tablet 1   • melatonin 3 MG tablet Take 2 tablets by mouth Every Night. 60 tablet 0   • memantine (NAMENDA) 10 MG tablet Take 1 tablet by mouth 2 (Two) Times a Day. 180 tablet 1   • mirtazapine (REMERON) 15 MG tablet TAKE ONE TABLET BY MOUTH ONCE NIGHTLY 90 tablet 1   • Multiple Vitamins-Minerals (PRESERVISION AREDS PO) Take  by mouth 2 (Two) Times a Day.     • QUEtiapine (SEROquel) 25 MG tablet TAKE ONE TABLET BY MOUTH EVERY MORNING AND IN THE EVENING IF STILL AGITATED 180 tablet 1   • vitamin B-12 (CYANOCOBALAMIN) 100 MCG tablet Take 10 mcg by mouth 3 (Three) Times a Week. Takes Monday, Wednesday and friday       No current facility-administered medications on file prior to visit.      What are your concerns:  ANA PAULA IS WITH ROBERTO FOR THE NEXT WEEK.  HE NEEDS KNOW WHAT MEDS ANA PAULA IS TAKING AND HOW.  PLEASE CALL HIM

## 2021-09-30 ENCOUNTER — FLU SHOT (OUTPATIENT)
Dept: INTERNAL MEDICINE | Facility: CLINIC | Age: 86
End: 2021-09-30

## 2021-09-30 DIAGNOSIS — Z23 NEED FOR INFLUENZA VACCINATION: Primary | ICD-10-CM

## 2021-09-30 PROCEDURE — 90662 IIV NO PRSV INCREASED AG IM: CPT | Performed by: INTERNAL MEDICINE

## 2021-09-30 PROCEDURE — G0008 ADMIN INFLUENZA VIRUS VAC: HCPCS | Performed by: INTERNAL MEDICINE

## 2021-10-04 ENCOUNTER — TELEPHONE (OUTPATIENT)
Dept: INTERNAL MEDICINE | Facility: CLINIC | Age: 86
End: 2021-10-04

## 2021-10-04 NOTE — TELEPHONE ENCOUNTER
They should not apply ice, they should be applying heat for 20 minutes 3-4 times daily.  I also recommend Advil or ibuprofen 2 tablets with food 3 times daily.

## 2021-10-04 NOTE — TELEPHONE ENCOUNTER
Caller: Denny Maurice (POA)    Relationship to patient: Emergency Contact    Best call back number: [phone]      Additional information or concerns: PATIENT GOT THE COVID BOOSTER SHOT ON Wednesday 09/29 AND THEY NOTICED THE SITE OF THE SHOT LOOKS RED AND AGITATED YESTERDAY. PLEASE CALL AND ADVISE    What is the patients preferred pharmacy: [facility] [address] [place] [address] [facility] [phone] [facility] [phone]

## 2021-10-25 ENCOUNTER — OFFICE VISIT (OUTPATIENT)
Dept: INTERNAL MEDICINE | Facility: CLINIC | Age: 86
End: 2021-10-25

## 2021-10-25 VITALS
SYSTOLIC BLOOD PRESSURE: 128 MMHG | WEIGHT: 140 LBS | HEART RATE: 98 BPM | HEIGHT: 63 IN | OXYGEN SATURATION: 94 % | TEMPERATURE: 97.1 F | BODY MASS INDEX: 24.8 KG/M2 | DIASTOLIC BLOOD PRESSURE: 72 MMHG

## 2021-10-25 DIAGNOSIS — F32.0 CURRENT MILD EPISODE OF MAJOR DEPRESSIVE DISORDER WITHOUT PRIOR EPISODE (HCC): ICD-10-CM

## 2021-10-25 DIAGNOSIS — E03.9 ACQUIRED HYPOTHYROIDISM: ICD-10-CM

## 2021-10-25 DIAGNOSIS — I10 ESSENTIAL HYPERTENSION: Primary | ICD-10-CM

## 2021-10-25 DIAGNOSIS — G30.1 LATE ONSET ALZHEIMER'S DISEASE WITH BEHAVIORAL DISTURBANCE (HCC): ICD-10-CM

## 2021-10-25 DIAGNOSIS — F02.818 LATE ONSET ALZHEIMER'S DISEASE WITH BEHAVIORAL DISTURBANCE (HCC): ICD-10-CM

## 2021-10-25 DIAGNOSIS — M16.10 ARTHRITIS OF HIP: ICD-10-CM

## 2021-10-25 PROCEDURE — 99214 OFFICE O/P EST MOD 30 MIN: CPT | Performed by: INTERNAL MEDICINE

## 2021-10-25 NOTE — PROGRESS NOTES
Chief Complaint   Patient presents with   • Follow-up     for depression, HTN, and Alzheimer. Would like lightweight wheelchair.      Subjective   Adelia Maurice is a 89 y.o. female.     Here today for follow-up of HTN, Alzheimer's disease, depression, allergies, hypothyroid and vitamin D/B12 deficiency.  HTN-BP is well controlled today.  Patient denies any CP, palpitations, HA or edema.  She complains of SOA.    Alzheimer's dementia/depression- She is currently on Namenda 10 mg twice daily.  In addition she is on Remeron 15 mg nightly and Seroquel 25 mg in p.m.  She is eating well. She sleeps a lot.  Goes to bed at 2100 and sleeps until she is awakened.   She is unsteady with gait, but is better with using her walker. She is very SOA with exertion and has difficulty ambulating distances and requests a light weight wheelchair.     Vitamin D/B12 deficiency-she remains on vitamin D daily.  She is taking vitamin B 1000 mcg on Mondays, Wednesdays and Fridays.  Hypothyroid-TSH was elevated 4 months ago at 6.91 and low-dose levothyroxine 25 mcg was started to see if this would help her depression and dementia.  She is getting shots for her macular degeneration and is on drops for glaucoma.        The following portions of the patient's history were reviewed and updated as appropriate: allergies, current medications, past family history, past medical history, past social history, past surgical history and problem list.    Review of Systems   Constitutional: Negative for activity change, appetite change and unexpected weight change.   HENT: Positive for rhinorrhea and trouble swallowing.    Eyes: Negative for visual disturbance.   Respiratory: Positive for shortness of breath.    Cardiovascular: Negative for chest pain, palpitations and leg swelling.   Gastrointestinal: Negative for abdominal pain.   Genitourinary: Negative for hematuria.   Musculoskeletal: Positive for gait problem. Negative for back pain.  "  Neurological: Positive for weakness. Negative for headaches.   Psychiatric/Behavioral: Positive for confusion and sleep disturbance. Negative for dysphoric mood. The patient is not nervous/anxious.        Objective   /72   Pulse 98   Temp 97.1 °F (36.2 °C)   Ht 160 cm (63\")   Wt 63.5 kg (140 lb)   SpO2 94%   BMI 24.80 kg/m²   Body mass index is 24.8 kg/m².  Physical Exam  Vitals and nursing note reviewed.   Constitutional:       General: She is not in acute distress.     Appearance: Normal appearance. She is well-developed. She is not ill-appearing.      Comments: Kind and pleasant elderly female , appears stated age and in NAD today, patient grunts with every breath   HENT:      Head: Normocephalic and atraumatic.      Right Ear: External ear normal.      Left Ear: External ear normal.   Eyes:      General:         Right eye: No discharge.         Left eye: No discharge.      Extraocular Movements: Extraocular movements intact.      Conjunctiva/sclera: Conjunctivae normal.      Pupils: Pupils are equal, round, and reactive to light.   Neck:      Thyroid: No thyromegaly.      Vascular: No carotid bruit.      Comments: No thyromegaly or mass  Cardiovascular:      Rate and Rhythm: Normal rate and regular rhythm.      Pulses: Normal pulses.      Heart sounds: Normal heart sounds. No murmur heard.      Pulmonary:      Effort: Pulmonary effort is normal. No respiratory distress.      Breath sounds: Normal breath sounds. No wheezing.   Abdominal:      General: Bowel sounds are normal. There is no distension.      Palpations: Abdomen is soft.      Tenderness: There is no abdominal tenderness.   Musculoskeletal:         General: Normal range of motion.      Cervical back: Normal range of motion and neck supple.      Right lower leg: No edema.      Left lower leg: No edema.      Comments: Muscle strength 4/5 throughout all extremities   Lymphadenopathy:      Cervical: No cervical adenopathy.   Skin:     " General: Skin is warm.      Findings: No rash.   Neurological:      General: No focal deficit present.      Mental Status: She is alert. Mental status is at baseline.      Cranial Nerves: No cranial nerve deficit.      Motor: No weakness.      Coordination: Coordination normal.      Gait: Gait normal.   Psychiatric:         Mood and Affect: Mood normal.         Behavior: Behavior normal.      Comments: Son answers questions for patient although she will answer when spoken to         Assessment/Plan   Adelia Maurice is here today and the following problems have been addressed:      Diagnoses and all orders for this visit:    1. Essential hypertension (Primary)    2. Current mild episode of major depressive disorder without prior episode (HCC)    3. Late onset Alzheimer's disease with behavioral disturbance (HCC)    4. Arthritis of hip    5. Acquired hypothyroidism  -     TSH  -     T4, Free      Labs as noted  Follow heart healthy/low salt diet  Monitor blood pressure as needed  Take all medications as prescribed  Continue current dose of Namenda and Remeron, use Seroquel at night or as needed for agitation  Follow-up with eye doctor as needed for glaucoma and macular degeneration  We will arrange for light weight wheelchair as patient has significant shortness of breath and weakness that limits her ability to ambulate distances.  Patient has an unstable gait that is better with using her walker.  However, her walker cannot fully resolve her mobility issues.  She needs a walker to complete ADLs with in her home.  She has a caregiver for assistance when available.  She is very weak in upper extremities and cannot self propel a standard wheelchair due to weakness and shortness of breath, therefore I am requesting a light weight wheelchair due to upper extremity weakness and chronic shortness of breath.    Return in about 6 months (around 4/25/2022) for Medicare Wellness.      Marilyn K. Vermeesch, MD      Please  note that portions of this note were completed with a voice recognition program.  Efforts were made to edit dictation, but occasionally words are mistranscribed.

## 2021-10-26 LAB
T4 FREE SERPL-MCNC: 1.1 NG/DL (ref 0.93–1.7)
TSH SERPL DL<=0.005 MIU/L-ACNC: 5.78 UIU/ML (ref 0.27–4.2)

## 2021-10-26 RX ORDER — LEVOTHYROXINE SODIUM 0.05 MG/1
50 TABLET ORAL DAILY
Qty: 90 TABLET | Refills: 1 | Status: SHIPPED | OUTPATIENT
Start: 2021-10-26 | End: 2022-04-22

## 2021-10-26 NOTE — PROGRESS NOTES
Please call son and let him know that thyroid function tests are still slightly abnormal.  I feel she would benefit from higher dose of levothyroxine and I have sent in 50 mcg dose to [facility].  There is enough for 6 months until next office visit and we will check labs at that time.  Thank you

## 2021-11-18 DIAGNOSIS — I10 ESSENTIAL HYPERTENSION: Primary | ICD-10-CM

## 2021-11-19 RX ORDER — AMLODIPINE BESYLATE 2.5 MG/1
TABLET ORAL
Qty: 90 TABLET | Refills: 3 | Status: SHIPPED | OUTPATIENT
Start: 2021-11-19 | End: 2022-11-28

## 2021-11-19 NOTE — TELEPHONE ENCOUNTER
Rx Refill Note  Requested Prescriptions     Pending Prescriptions Disp Refills   • amLODIPine (NORVASC) 2.5 MG tablet [Pharmacy Med Name: amLODIPine BESYLATE 2.5 MG TAB] 90 tablet 3     Sig: TAKE ONE TABLET BY MOUTH DAILY      Last office visit with prescribing clinician: 10/25/2021      Next office visit with prescribing clinician: 4/25/2022            MATEUS FORRESTER MA  11/19/21, 08:39 EST

## 2022-01-12 ENCOUNTER — TELEPHONE (OUTPATIENT)
Dept: INTERNAL MEDICINE | Facility: CLINIC | Age: 87
End: 2022-01-12

## 2022-01-12 NOTE — TELEPHONE ENCOUNTER
Caller: RadhaRAMEZChad VASQUEZ    Relationship: Emergency Contact    Best call back number: [phone] (M)    What is the best time to reach you: ANYTIME    Who are you requesting to speak with (clinical staff, provider,  specific staff member):   [facility]/ DR. VERMEESCH    Do you know the name of the person who called:   PATIENT SON(POJOSE GUZMAN)    What was the call regarding:   PATIENT SON STATES THAT HE HAD REQUESTED THAT THE P[ATIENT GET A PRESCRIPTION / ORDER FOR A TRANSPORT WHEELCHAIR TO AIDE HIM WITH THE TRANSPORT OF THE PATIENT AND NO ONE HAS REACHED BACK OUT TO HIM IN REGARDS TO THIS REQUEST. PATIENT IS UNSTEADY ON HER FEET AND IS A FALL RISK EVEN TO GET HER UP TO WALK AROUND. PATIENT SON WILL NEED THIS TRANSPORT WHEELCHAIR AT SON AS POSSIBLE AS HE STATES THAT IT IS GETTING IMPOSSIBLE TO GET AROUND WITH HER.      Do you require a callback: YES, PLEASE      PATIENT HAS BEEN ADVISED THAT THIS REQUEST HAS BEEN MARKED AS A HIGH PRIORITY, PLEASE ALLOW 48 HOURS FOR OUR CLINICAL TEAM TO FOLLOW UP ON THIS REQUEST.

## 2022-01-13 NOTE — TELEPHONE ENCOUNTER
Tiki spoke with Elena at [facility], stated they did receive the order and not sure why it's not been taken care of. Attempted contacting son, no answer. Left him a detailed VM and that they would be calling him.

## 2022-01-20 RX ORDER — LISINOPRIL 10 MG/1
TABLET ORAL
Qty: 90 TABLET | Refills: 1 | Status: SHIPPED | OUTPATIENT
Start: 2022-01-20 | End: 2022-07-07

## 2022-01-31 RX ORDER — MIRTAZAPINE 15 MG/1
TABLET, FILM COATED ORAL
Qty: 90 TABLET | Refills: 1 | Status: SHIPPED | OUTPATIENT
Start: 2022-01-31 | End: 2022-11-17

## 2022-01-31 NOTE — TELEPHONE ENCOUNTER
Rx Refill Note  Requested Prescriptions     Pending Prescriptions Disp Refills   • mirtazapine (REMERON) 15 MG tablet [Pharmacy Med Name: MIRTAZAPINE 15 MG TABLET] 90 tablet 1     Sig: TAKE ONE TABLET BY MOUTH ONCE NIGHTLY      Last office visit with prescribing clinician: 10/25/2021      Next office visit with prescribing clinician: 4/25/2022            MATEUS FORRESTER MA  01/31/22, 09:22 EST

## 2022-02-10 RX ORDER — MEMANTINE HYDROCHLORIDE 10 MG/1
TABLET ORAL
Qty: 180 TABLET | Refills: 1 | Status: SHIPPED | OUTPATIENT
Start: 2022-02-10 | End: 2022-02-24 | Stop reason: SDUPTHER

## 2022-02-18 ENCOUNTER — TELEPHONE (OUTPATIENT)
Dept: INTERNAL MEDICINE | Facility: CLINIC | Age: 87
End: 2022-02-18

## 2022-02-18 NOTE — TELEPHONE ENCOUNTER
Caller: Chad Maurice (POA)    Relationship: Emergency Contact    Best call back number: [phone]     What is the best time to reach you: ANYTIME    Who are you requesting to speak with (clinical staff, provider,  specific staff member): STAFF    Do you know the name of the person who called: N/A    What was the call regarding: FMLA PAPERWORK    Do you require a callback: YES

## 2022-02-18 NOTE — TELEPHONE ENCOUNTER
Son states he is wanting [facility] paperwork filled out as he is one of her primary caregivers. Son states his PCP will not fill out paperwork since his mother is not a Pt. Son advised to make 2 copies of paperwork, fill out one of them himself and drop both off here at the office.

## 2022-02-24 RX ORDER — MEMANTINE HYDROCHLORIDE 10 MG/1
10 TABLET ORAL 2 TIMES DAILY
Qty: 180 TABLET | Refills: 1 | Status: SHIPPED | OUTPATIENT
Start: 2022-02-24 | End: 2022-08-19

## 2022-02-24 NOTE — TELEPHONE ENCOUNTER
Caller: Denny Maurice (POA)    Relationship: Emergency Contact    Best call back number: [phone]    Requested Prescriptions:   Requested Prescriptions     Pending Prescriptions Disp Refills   • memantine (NAMENDA) 10 MG tablet 180 tablet 1     Sig: Take 1 tablet by mouth 2 (Two) Times a Day.        Pharmacy where request should be sent: [facility] [address] [place] [address] [phone] [facility] [phone] FX     Additional details provided by patient: PATIENT OUT OF MEDICATION    Does the patient have less than a 3 day supply:  [x] Yes  [] No    Purnima Moreno   02/24/22 16:18 EST

## 2022-04-08 ENCOUNTER — OFFICE VISIT (OUTPATIENT)
Dept: INTERNAL MEDICINE | Facility: CLINIC | Age: 87
End: 2022-04-08

## 2022-04-08 VITALS
DIASTOLIC BLOOD PRESSURE: 72 MMHG | OXYGEN SATURATION: 95 % | HEART RATE: 81 BPM | SYSTOLIC BLOOD PRESSURE: 120 MMHG | TEMPERATURE: 97.3 F

## 2022-04-08 DIAGNOSIS — L03.211 CELLULITIS, FACE: Primary | ICD-10-CM

## 2022-04-08 DIAGNOSIS — L20.84 INTRINSIC ECZEMA: ICD-10-CM

## 2022-04-08 DIAGNOSIS — L30.4 ERYTHEMA INTERTRIGO: ICD-10-CM

## 2022-04-08 PROCEDURE — 99213 OFFICE O/P EST LOW 20 MIN: CPT | Performed by: INTERNAL MEDICINE

## 2022-04-08 NOTE — PROGRESS NOTES
Chief Complaint   Patient presents with   • Abstract     Pt's had swelling of left ankle. Has been complaining of left ear pain X Monday, was taken UTC on Tuesday and prescribed Cephalexin 500 mg TID X 7 days.      Subjective   Adelia Maurice is a 89 y.o. female.     Patient here today with complaints of left ankle pain and left ear/facial pain since early this week.  She was seen at urgent care after calling here and we did not have an appointment.  She was encouraged to seek care at urgent care due to her complaints.  On review of urgent care note, she was diagnosed with left facial cellulitis and started on Keflex for this complaint.  She remains on Keflex 500 mg 3 times daily to total 7 days.  No fevers or chills.  She has been eating well.   She is also complaining of left ankle pain.  Nurse practitioner at urgent care did evaluate left ankle and note states she had some dry skin over area but no abnormality of ankle on range of motion testing.  There has been an area of redness for about a month.  Her son she prefers to sleep on her left side and there is pressure on her left ankle at night.  She denies any trauma or injury to the ankle.  There is no pain with movement of her ankle.  She does not limp.       The following portions of the patient's history were reviewed and updated as appropriate: allergies, current medications, past family history, past medical history, past social history, past surgical history and problem list.    Review of Systems   Constitutional: Negative for chills and fever.   HENT: Negative for ear pain, facial swelling, sinus pressure, sinus pain and sore throat.    Skin: Positive for color change and rash. Negative for wound.       Objective   /72   Pulse 81   Temp 97.3 °F (36.3 °C)   SpO2 95%   There is no height or weight on file to calculate BMI.  Physical Exam  Vitals and nursing note reviewed.   Constitutional:       General: She is not in acute distress.      Appearance: Normal appearance. She is not ill-appearing.      Comments: Kind and pleasant elderly female in no distress today   HENT:      Head: Normocephalic and atraumatic.      Comments: No facial swelling and no erythema over left side of face or left pinna     Right Ear: Tympanic membrane, ear canal and external ear normal.      Left Ear: Tympanic membrane, ear canal and external ear normal.      Nose: No congestion.      Mouth/Throat:      Pharynx: No posterior oropharyngeal erythema.   Eyes:      General:         Right eye: No discharge.         Left eye: No discharge.      Extraocular Movements: Extraocular movements intact.      Comments: Mild edema noted under her eyes bilaterally that is chronic   Pulmonary:      Effort: Pulmonary effort is normal. No respiratory distress.   Musculoskeletal:      Comments: Left ankle with full range of motion and no pain, there is mild erythema and dryness over lateral malleolus with no ulceration or skin lesions   Skin:     Findings: Rash present.      Comments: Diffuse erythema noted under left breast with no satellite lesions   Neurological:      General: No focal deficit present.      Mental Status: She is alert. Mental status is at baseline.      Motor: Weakness present.      Gait: Gait abnormal.      Comments: Patient is seated in wheelchair today   Psychiatric:         Behavior: Behavior normal.         Thought Content: Thought content normal.         Assessment/Plan   Adelia Maurice is here today and the following problems have been addressed:      Diagnoses and all orders for this visit:    1. Cellulitis, face (Primary)    2. Intrinsic eczema    3. Erythema intertrigo    Finished Keflex 3 times daily for 7 days for facial cellulitis, much improved  She has eczema over left lateral ankle likely due to recurrent rubbing over sheets at night.  Encourage son to elevate leg on pillow at night to prevent ulceration.  Also recommend he use mixture of Cetaphil  moisturizing cream and hydrocortisone to help with rash from irritation over area  She has significant erythema under her left breast but no yeast at this time.  I recommend cornstarch every night and allow area open to air as much as possible with no bra during the day.  If area becomes erythematous with surrounding red papular lesions as explained to family, I recommend they use over-the-counter Lotrisone or Tinactin for infection    Return to clinic in a few weeks for routine follow-up of all medical problems and above    Please note that portions of this note were completed with a voice recognition program.  Efforts were made to edit dictation, but occasionally words are mistranscribed.

## 2022-04-22 RX ORDER — LEVOTHYROXINE SODIUM 0.05 MG/1
TABLET ORAL
Qty: 90 TABLET | Refills: 1 | Status: SHIPPED | OUTPATIENT
Start: 2022-04-22 | End: 2022-10-04

## 2022-04-25 ENCOUNTER — OFFICE VISIT (OUTPATIENT)
Dept: INTERNAL MEDICINE | Facility: CLINIC | Age: 87
End: 2022-04-25

## 2022-04-25 VITALS
BODY MASS INDEX: 25.69 KG/M2 | OXYGEN SATURATION: 95 % | SYSTOLIC BLOOD PRESSURE: 122 MMHG | HEIGHT: 63 IN | TEMPERATURE: 97 F | DIASTOLIC BLOOD PRESSURE: 68 MMHG | WEIGHT: 145 LBS | HEART RATE: 83 BPM

## 2022-04-25 DIAGNOSIS — F02.818 LATE ONSET ALZHEIMER'S DISEASE WITH BEHAVIORAL DISTURBANCE: ICD-10-CM

## 2022-04-25 DIAGNOSIS — F32.0 CURRENT MILD EPISODE OF MAJOR DEPRESSIVE DISORDER WITHOUT PRIOR EPISODE: ICD-10-CM

## 2022-04-25 DIAGNOSIS — G30.1 LATE ONSET ALZHEIMER'S DISEASE WITH BEHAVIORAL DISTURBANCE: ICD-10-CM

## 2022-04-25 DIAGNOSIS — E03.9 ACQUIRED HYPOTHYROIDISM: ICD-10-CM

## 2022-04-25 DIAGNOSIS — Z00.00 ENCOUNTER FOR MEDICARE ANNUAL WELLNESS EXAM: Primary | ICD-10-CM

## 2022-04-25 DIAGNOSIS — I10 ESSENTIAL HYPERTENSION: ICD-10-CM

## 2022-04-25 PROCEDURE — 1159F MED LIST DOCD IN RCRD: CPT | Performed by: INTERNAL MEDICINE

## 2022-04-25 PROCEDURE — G0439 PPPS, SUBSEQ VISIT: HCPCS | Performed by: INTERNAL MEDICINE

## 2022-04-25 PROCEDURE — 1170F FXNL STATUS ASSESSED: CPT | Performed by: INTERNAL MEDICINE

## 2022-04-25 NOTE — PROGRESS NOTES
The ABCs of the Annual Wellness Visit  Subsequent Medicare Wellness Visit    Chief Complaint   Patient presents with   • Medicare Wellness-subsequent      Subjective    History of Present Illness:  Adelia Maurice is a 89 y.o. female who presents for a Subsequent Medicare Wellness Visit.  PMH of HTN, allergies, carotid bruit, vitamin D and B12 deficiency, hypothyroid, macular degeneration, depression, arthritis, Alzheimer's disease.  Her facial celllulitis is all gone.  The rash under breast from intertrigo is also gone.  She denies any CP, SOA, palpitations.  Her son states she does not always eat unless things are sweetened with syrup.  She takes all meds as directed. She is sleeping more than she needs to.  No recent falls.   She does not like to shower or bathe.      The following portions of the patient's history were reviewed and   updated as appropriate: allergies, current medications, past family history, past medical history, past social history, past surgical history and problem list.    Compared to one year ago, the patient feels her physical   health is the same.    Compared to one year ago, the patient feels her mental   health is the same.    Recent Hospitalizations:  She was not admitted to the hospital during the last year.       Current Medical Providers:  Patient Care Team:  Vermeesch, Marilyn K, MD as PCP - General (Internal Medicine & Pediatrics)    Outpatient Medications Prior to Visit   Medication Sig Dispense Refill   • albuterol sulfate  (90 Base) MCG/ACT inhaler Inhale 2 puffs Every 4 (Four) Hours As Needed for Wheezing or Shortness of Air. 1 inhaler 0   • amLODIPine (NORVASC) 2.5 MG tablet TAKE ONE TABLET BY MOUTH DAILY 90 tablet 3   • aspirin 81 MG tablet Take 1 tablet by mouth Daily. Take one tab every other day 30 tablet 0   • cholecalciferol (VITAMIN D3) 1000 units tablet Take 2,000 Units by mouth Daily.     • latanoprost (XALATAN) 0.005 % ophthalmic solution Administer 1 drop  to both eyes every night.     • Latanoprost 0.005 % emulsion latanoprost 0.005 % eye drops   INSTILL 1 DROP IN EACH EYE EVERY NIGHT AT BEDTIME     • levothyroxine (SYNTHROID, LEVOTHROID) 50 MCG tablet TAKE ONE TABLET BY MOUTH DAILY 90 tablet 1   • lisinopril (PRINIVIL,ZESTRIL) 10 MG tablet TAKE ONE TABLET BY MOUTH DAILY 90 tablet 1   • melatonin 3 MG tablet Take 2 tablets by mouth Every Night. 60 tablet 0   • memantine (NAMENDA) 10 MG tablet Take 1 tablet by mouth 2 (Two) Times a Day. 180 tablet 1   • mirtazapine (REMERON) 15 MG tablet TAKE ONE TABLET BY MOUTH ONCE NIGHTLY 90 tablet 1   • Multiple Vitamins-Minerals (PRESERVISION AREDS PO) Take  by mouth 2 (Two) Times a Day.     • QUEtiapine (SEROquel) 25 MG tablet TAKE ONE TABLET BY MOUTH EVERY MORNING AND IN THE EVENING IF STILL AGITATED 180 tablet 1   • vitamin B-12 (CYANOCOBALAMIN) 100 MCG tablet Take 10 mcg by mouth 3 (Three) Times a Week. Takes Monday, Wednesday and friday     • cephalexin (KEFLEX) 500 MG capsule Take 1 capsule by mouth 3 (Three) Times a Day. 21 capsule 0     No facility-administered medications prior to visit.       No opioid medication identified on active medication list. I have reviewed chart for other potential  high risk medication/s and harmful drug interactions in the elderly.          Aspirin is on active medication list. Aspirin use is indicated based on review of current medical condition/s. Pros and cons of this therapy have been discussed today. Benefits of this medication outweigh potential harm.  Patient has been encouraged to continue taking this medication.  .      Patient Active Problem List   Diagnosis   • Depression   • Essential hypertension   • Neuralgia   • Macular degeneration   • Arthritis of hip   • Carotid bruit   • Fatigue   • BMI 29.0-29.9,adult   • Environmental allergies   • Vitamin D deficiency   • Vitamin B 12 deficiency   • Late onset Alzheimer's disease with behavioral disturbance (HCC)   • Encounter for  "Medicare annual wellness exam   • Acquired hypothyroidism   • Cellulitis, face   • Intrinsic eczema   • Erythema intertrigo     Advance Care Planning  Advance Directive is on file.  ACP discussion was held with the patient during this visit. Patient has an advance directive in EMR which is still valid.     Review of Systems   Constitutional: Negative.    HENT: Positive for hearing loss.    Eyes: Negative.    Respiratory: Negative.    Cardiovascular: Negative.    Gastrointestinal: Negative.    Endocrine: Negative.    Genitourinary: Negative.    Musculoskeletal: Positive for arthralgias and gait problem.   Skin: Positive for color change.        Rash on left lateral ankle-improving   Allergic/Immunologic: Negative.    Neurological: Positive for confusion.   Hematological: Negative.         Objective    Vitals:    04/25/22 1036   BP: 122/68   Pulse: 83   Temp: 97 °F (36.1 °C)   SpO2: 95%   Weight: 65.8 kg (145 lb)   Height: 160 cm (63\")   PainSc: 0-No pain     BMI Readings from Last 1 Encounters:   04/25/22 25.69 kg/m²   BMI is above normal parameters. Recommendations include: exercise counseling and nutrition counseling    Does the patient have evidence of cognitive impairment? Yes    Physical Exam  Vitals and nursing note reviewed.   Constitutional:       General: She is not in acute distress.     Appearance: Normal appearance. She is well-developed. She is not ill-appearing.      Comments: Kind and pleasant elderly female, appears stated age and in NAD today   HENT:      Head: Normocephalic and atraumatic.      Right Ear: Tympanic membrane, ear canal and external ear normal.      Left Ear: Tympanic membrane, ear canal and external ear normal.      Nose: No congestion.      Mouth/Throat:      Pharynx: No posterior oropharyngeal erythema.   Eyes:      General:         Right eye: No discharge.         Left eye: No discharge.      Extraocular Movements: Extraocular movements intact.      Conjunctiva/sclera: Conjunctivae " normal.      Pupils: Pupils are equal, round, and reactive to light.   Neck:      Thyroid: No thyromegaly.      Vascular: No carotid bruit.      Comments: No thyromegaly or mass  Cardiovascular:      Rate and Rhythm: Normal rate and regular rhythm.      Pulses: Normal pulses.      Heart sounds: Normal heart sounds. No murmur heard.  Pulmonary:      Effort: Pulmonary effort is normal. No respiratory distress.      Breath sounds: Normal breath sounds. No wheezing.   Abdominal:      General: Bowel sounds are normal. There is no distension.      Palpations: Abdomen is soft.      Tenderness: There is no abdominal tenderness.   Musculoskeletal:         General: Normal range of motion.      Cervical back: Normal range of motion and neck supple.      Right lower leg: No edema.      Left lower leg: No edema.   Lymphadenopathy:      Cervical: No cervical adenopathy.   Skin:     General: Skin is warm.      Findings: Erythema present. No rash.      Comments: Left lateral malleolus with approximate 2 cm area of mild erythema, improved with use of Cetaphil/hydrocortisone   Neurological:      General: No focal deficit present.      Mental Status: She is alert and oriented to person, place, and time. Mental status is at baseline.      Cranial Nerves: No cranial nerve deficit.      Motor: Weakness present.      Coordination: Coordination normal.      Gait: Gait abnormal.      Comments: Patient is seated in transport chair due to generalized weakness of hip flexors and lower extremities   Psychiatric:         Mood and Affect: Mood normal.         Behavior: Behavior normal.         Thought Content: Thought content normal.         Judgment: Judgment normal.      Comments: Her son answers most questions but she does chime in and answer questions on occasion, she is alert and bright today                 HEALTH RISK ASSESSMENT    Smoking Status:  Social History     Tobacco Use   Smoking Status Never Smoker   Smokeless Tobacco Never Used      Alcohol Consumption:  Social History     Substance and Sexual Activity   Alcohol Use No     Fall Risk Screen:    STEADI Fall Risk Assessment was completed, and patient is at MODERATE risk for falls. Assessment completed on:4/25/2022    Depression Screening:  PHQ-2/PHQ-9 Depression Screening 4/25/2022   Retired PHQ-9 Total Score -   Retired Total Score -   Little Interest or Pleasure in Doing Things 0-->not at all   Feeling Down, Depressed or Hopeless 0-->not at all   PHQ-9: Brief Depression Severity Measure Score 0       Health Habits and Functional and Cognitive Screening:  Functional & Cognitive Status 4/25/2022   Do you have difficulty preparing food and eating? Yes   Do you have difficulty bathing yourself, getting dressed or grooming yourself? Yes   Do you have difficulty using the toilet? Yes   Do you have difficulty moving around from place to place? Yes   Do you have trouble with steps or getting out of a bed or a chair? Yes   Current Diet Limited Junk Food   Dental Exam Up to date   Eye Exam Up to date   Exercise (times per week) 0 times per week   Current Exercises Include No Regular Exercise   Current Exercise Activities Include -   Do you need help using the phone?  Yes   Are you deaf or do you have serious difficulty hearing?  Yes   Do you need help with transportation? Yes   Do you need help shopping? Yes   Do you need help preparing meals?  Yes   Do you need help with housework?  Yes   Do you need help with laundry? Yes   Do you need help taking your medications? Yes   Do you need help managing money? Yes   Do you ever drive or ride in a car without wearing a seat belt? No   Have you felt unusual stress, anger or loneliness in the last month? No   Who do you live with? Child   If you need help, do you have trouble finding someone available to you? No   Have you been bothered in the last four weeks by sexual problems? No   Do you have difficulty concentrating, remembering or making decisions? Yes        Age-appropriate Screening Schedule:  Refer to the list below for future screening recommendations based on patient's age, sex and/or medical conditions. Orders for these recommended tests are listed in the plan section. The patient has been provided with a written plan.    Health Maintenance   Topic Date Due   • INFLUENZA VACCINE  08/01/2022   • MAMMOGRAM  Discontinued   • DXA SCAN  Discontinued   • TDAP/TD VACCINES  Discontinued   • ZOSTER VACCINE  Discontinued              Assessment/Plan   CMS Preventative Services Quick Reference  Risk Factors Identified During Encounter  Dementia/Memory   Fall Risk-High or Moderate  Inactivity/Sedentary  The above risks/problems have been discussed with the patient.  Follow up actions/plans if indicated are seen below in the Assessment/Plan Section.  Pertinent information has been shared with the patient in the After Visit Summary.    Diagnoses and all orders for this visit:    1. Encounter for Medicare annual wellness exam (Primary)  -     CBC & Differential  -     Comprehensive Metabolic Panel  -     Lipid Panel With / Chol / HDL Ratio  -     T4, Free  -     TSH    2. Essential hypertension  -     CBC & Differential  -     Comprehensive Metabolic Panel  -     Lipid Panel With / Chol / HDL Ratio    3. Acquired hypothyroidism  -     T4, Free  -     TSH    4. Current mild episode of major depressive disorder without prior episode (HCC)    5. Late onset Alzheimer's disease with behavioral disturbance (HCC)    Follow heart healthy/low salt diet  Avoid processed foods  Monitor blood pressure as discussed  Exercise as tolerated up to 30 minutes 5 days per week  Take all medications as prescribed  Labs as noted  Continue current dose of levothyroxine, will adjust dose if needed based on labs  Her mood is doing quite well on current dose of Remeron with use of Seroquel at night to help with sleep  Her memory is stable with use of Namenda daily, no agitation with use of  Seroquel  She continues to use her eyedrops regularly due to underlying glaucoma    Follow Up:   Return in about 4 months (around 8/25/2022) for Next scheduled follow up.     An After Visit Summary and PPPS were made available to the patient.

## 2022-04-26 LAB
ALBUMIN SERPL-MCNC: 4.1 G/DL (ref 3.5–5.2)
ALBUMIN/GLOB SERPL: 1.5 G/DL
ALP SERPL-CCNC: 115 U/L (ref 39–117)
ALT SERPL-CCNC: 5 U/L (ref 1–33)
AST SERPL-CCNC: 14 U/L (ref 1–32)
BASOPHILS # BLD AUTO: 0.03 10*3/MM3 (ref 0–0.2)
BASOPHILS NFR BLD AUTO: 0.4 % (ref 0–1.5)
BILIRUB SERPL-MCNC: 0.4 MG/DL (ref 0–1.2)
BUN SERPL-MCNC: 16 MG/DL (ref 8–23)
BUN/CREAT SERPL: 18 (ref 7–25)
CALCIUM SERPL-MCNC: 9.6 MG/DL (ref 8.2–9.6)
CHLORIDE SERPL-SCNC: 106 MMOL/L (ref 98–107)
CHOLEST SERPL-MCNC: 228 MG/DL (ref 0–200)
CHOLEST/HDLC SERPL: 4.96 {RATIO}
CO2 SERPL-SCNC: 25.5 MMOL/L (ref 22–29)
CREAT SERPL-MCNC: 0.89 MG/DL (ref 0.57–1)
EGFRCR SERPLBLD CKD-EPI 2021: 58.3 ML/MIN/1.73
EOSINOPHIL # BLD AUTO: 0.2 10*3/MM3 (ref 0–0.4)
EOSINOPHIL NFR BLD AUTO: 3 % (ref 0.3–6.2)
ERYTHROCYTE [DISTWIDTH] IN BLOOD BY AUTOMATED COUNT: 13.1 % (ref 12.3–15.4)
GLOBULIN SER CALC-MCNC: 2.7 GM/DL
GLUCOSE SERPL-MCNC: 90 MG/DL (ref 65–99)
HCT VFR BLD AUTO: 39.2 % (ref 34–46.6)
HDLC SERPL-MCNC: 46 MG/DL (ref 40–60)
HGB BLD-MCNC: 12.5 G/DL (ref 12–15.9)
IMM GRANULOCYTES # BLD AUTO: 0.02 10*3/MM3 (ref 0–0.05)
IMM GRANULOCYTES NFR BLD AUTO: 0.3 % (ref 0–0.5)
LDLC SERPL CALC-MCNC: 161 MG/DL (ref 0–100)
LYMPHOCYTES # BLD AUTO: 2.08 10*3/MM3 (ref 0.7–3.1)
LYMPHOCYTES NFR BLD AUTO: 30.8 % (ref 19.6–45.3)
MCH RBC QN AUTO: 29.6 PG (ref 26.6–33)
MCHC RBC AUTO-ENTMCNC: 31.9 G/DL (ref 31.5–35.7)
MCV RBC AUTO: 92.9 FL (ref 79–97)
MONOCYTES # BLD AUTO: 0.52 10*3/MM3 (ref 0.1–0.9)
MONOCYTES NFR BLD AUTO: 7.7 % (ref 5–12)
NEUTROPHILS # BLD AUTO: 3.9 10*3/MM3 (ref 1.7–7)
NEUTROPHILS NFR BLD AUTO: 57.8 % (ref 42.7–76)
NRBC BLD AUTO-RTO: 0 /100 WBC (ref 0–0.2)
PLATELET # BLD AUTO: 238 10*3/MM3 (ref 140–450)
POTASSIUM SERPL-SCNC: 4.4 MMOL/L (ref 3.5–5.2)
PROT SERPL-MCNC: 6.8 G/DL (ref 6–8.5)
RBC # BLD AUTO: 4.22 10*6/MM3 (ref 3.77–5.28)
SODIUM SERPL-SCNC: 142 MMOL/L (ref 136–145)
T4 FREE SERPL-MCNC: 1.23 NG/DL (ref 0.93–1.7)
TRIGL SERPL-MCNC: 115 MG/DL (ref 0–150)
TSH SERPL DL<=0.005 MIU/L-ACNC: 4.17 UIU/ML (ref 0.27–4.2)
VLDLC SERPL CALC-MCNC: 21 MG/DL (ref 5–40)
WBC # BLD AUTO: 6.75 10*3/MM3 (ref 3.4–10.8)

## 2022-05-13 RX ORDER — QUETIAPINE FUMARATE 25 MG/1
TABLET, FILM COATED ORAL
Qty: 180 TABLET | Refills: 1 | Status: SHIPPED | OUTPATIENT
Start: 2022-05-13 | End: 2022-05-18

## 2022-05-18 RX ORDER — QUETIAPINE FUMARATE 25 MG/1
TABLET, FILM COATED ORAL
Qty: 180 TABLET | Refills: 1 | Status: SHIPPED | OUTPATIENT
Start: 2022-05-18

## 2022-07-07 RX ORDER — LISINOPRIL 10 MG/1
TABLET ORAL
Qty: 90 TABLET | Refills: 1 | Status: SHIPPED | OUTPATIENT
Start: 2022-07-07 | End: 2023-01-04

## 2022-08-19 RX ORDER — MEMANTINE HYDROCHLORIDE 10 MG/1
TABLET ORAL
Qty: 180 TABLET | Refills: 1 | Status: SHIPPED | OUTPATIENT
Start: 2022-08-19

## 2022-08-25 ENCOUNTER — OFFICE VISIT (OUTPATIENT)
Dept: INTERNAL MEDICINE | Facility: CLINIC | Age: 87
End: 2022-08-25

## 2022-08-25 VITALS
TEMPERATURE: 97.3 F | HEART RATE: 83 BPM | SYSTOLIC BLOOD PRESSURE: 148 MMHG | BODY MASS INDEX: 25.87 KG/M2 | OXYGEN SATURATION: 95 % | HEIGHT: 63 IN | DIASTOLIC BLOOD PRESSURE: 82 MMHG | WEIGHT: 146 LBS

## 2022-08-25 DIAGNOSIS — F32.0 CURRENT MILD EPISODE OF MAJOR DEPRESSIVE DISORDER WITHOUT PRIOR EPISODE: ICD-10-CM

## 2022-08-25 DIAGNOSIS — F02.818 LATE ONSET ALZHEIMER'S DISEASE WITH BEHAVIORAL DISTURBANCE: ICD-10-CM

## 2022-08-25 DIAGNOSIS — E03.9 ACQUIRED HYPOTHYROIDISM: ICD-10-CM

## 2022-08-25 DIAGNOSIS — I10 ESSENTIAL HYPERTENSION: Primary | ICD-10-CM

## 2022-08-25 DIAGNOSIS — G30.1 LATE ONSET ALZHEIMER'S DISEASE WITH BEHAVIORAL DISTURBANCE: ICD-10-CM

## 2022-08-25 PROBLEM — L03.211 CELLULITIS, FACE: Status: RESOLVED | Noted: 2022-04-08 | Resolved: 2022-08-25

## 2022-08-25 PROCEDURE — 99214 OFFICE O/P EST MOD 30 MIN: CPT | Performed by: INTERNAL MEDICINE

## 2022-08-25 NOTE — PROGRESS NOTES
Chief Complaint   Patient presents with   • Hypertension   • Hypothyroidism     Subjective   Adelia Maurice is a 89 y.o. female.     Here today for follow-up of hypertension, HLD, hypothyroidism, vitamin D/B12 deficiency, Alzheimer's dementia and depression.  Hypertension/HLD-BP is slightly elevated today. Her home BP is usually a little bit less.  Lipid panel elevated 4 months ago, however patient does not tolerate statins.  She denies CP, but is SOA at times.  No edema in feet until end of day.  She sleeps a lot.    Hypothyroid-patient is compliant with medication every day.  Family provides medication to patient. She is not constipated, she gets plenty of fiber and this is helpful.   Vitamin D/B12 deficiency-she receives her vitamins as directed.  Alzheimer's dementia/depression-she remains on Namenda for worsening dementia.  She is also on Remeron and Seroquel to help with sleep issues.  Her son states that she is sleeping well at night.  She has a good appetite on and off.  Family states she has only required the seroquel at night, none during the day.    She does not appear to be having any pain issues.        The following portions of the patient's history were reviewed and updated as appropriate: allergies, current medications, past family history, past medical history, past social history, past surgical history and problem list.    Review of Systems   Constitutional: Negative for activity change, appetite change and unexpected weight change.   HENT: Positive for hearing loss.    Eyes: Negative for visual disturbance.   Respiratory: Positive for shortness of breath.    Cardiovascular: Positive for leg swelling. Negative for chest pain and palpitations.   Gastrointestinal: Negative for abdominal pain.   Genitourinary: Negative for hematuria.   Musculoskeletal: Positive for gait problem. Negative for back pain.   Neurological: Positive for weakness. Negative for headaches.   Psychiatric/Behavioral:  "Positive for confusion. Negative for dysphoric mood and sleep disturbance. The patient is not nervous/anxious.        Objective   /82   Pulse 83   Temp 97.3 °F (36.3 °C)   Ht 160 cm (63\")   Wt 66.2 kg (146 lb)   SpO2 95%   BMI 25.86 kg/m²   Body mass index is 25.86 kg/m².  Physical Exam  Vitals and nursing note reviewed.   Constitutional:       General: She is not in acute distress.     Appearance: Normal appearance. She is well-developed. She is not ill-appearing.      Comments: Kind and pleasant elderly female, appears stated age and in NAD today   HENT:      Head: Normocephalic and atraumatic.      Right Ear: External ear normal.      Left Ear: External ear normal.   Eyes:      General:         Right eye: No discharge.         Left eye: No discharge.      Extraocular Movements: Extraocular movements intact.   Neck:      Thyroid: No thyromegaly.      Vascular: No carotid bruit.      Comments: No thyromegaly or mass  Cardiovascular:      Rate and Rhythm: Normal rate and regular rhythm.      Pulses: Normal pulses.      Heart sounds: Murmur heard.      Comments: Systolic murmur grade 2/6 over precordium, occasional ectopic beat noted  Pulmonary:      Effort: Pulmonary effort is normal. No respiratory distress.      Breath sounds: Normal breath sounds. No wheezing.   Abdominal:      General: Bowel sounds are normal. There is no distension.      Palpations: Abdomen is soft.      Tenderness: There is no abdominal tenderness.   Musculoskeletal:         General: Normal range of motion.      Cervical back: Normal range of motion and neck supple.      Right lower leg: No edema.      Left lower leg: No edema.   Lymphadenopathy:      Cervical: No cervical adenopathy.   Skin:     General: Skin is warm.      Findings: No rash.   Neurological:      General: No focal deficit present.      Mental Status: She is alert. Mental status is at baseline.      Cranial Nerves: No cranial nerve deficit.      Motor: Weakness " present.      Coordination: Coordination normal.      Gait: Gait abnormal.      Comments: Patient does not ambulate, she is currently in wheelchair due to diffuse weakness   Psychiatric:         Behavior: Behavior normal.         Thought Content: Thought content normal.      Comments: Patient mumbles throughout our office visit today, she answers questions intermittently when asked, otherwise does not speak and her son answers majority of questions         Assessment & Plan   Adelia Maurice is here today and the following problems have been addressed:      Diagnoses and all orders for this visit:    1. Essential hypertension (Primary)    2. Acquired hypothyroidism    3. Current mild episode of major depressive disorder without prior episode (HCC)    4. Late onset Alzheimer's disease with behavioral disturbance (HCC)        Follow heart healthy/low salt diet  Monitor blood pressure on occasion  Continue all current medications  Son states she is requiring Seroquel only at night, no behavior issues associated with her dementia  Continue Remeron, no anxiety or depression issues at this time  Continue current dose of levothyroxine, last labs were in normal range  Encourage family to do video visit for next exam, as it is becoming more difficult to get patient out of house    Return in about 4 months (around 12/25/2022) for Next scheduled follow up via video visit.      Marilyn K. Vermeesch, MD      Please note that portions of this note were completed with a voice recognition program.  Efforts were made to edit dictation, but occasionally words are mistranscribed.

## 2022-10-04 RX ORDER — LEVOTHYROXINE SODIUM 0.05 MG/1
TABLET ORAL
Qty: 90 TABLET | Refills: 1 | Status: SHIPPED | OUTPATIENT
Start: 2022-10-04

## 2022-11-17 RX ORDER — MIRTAZAPINE 15 MG/1
TABLET, FILM COATED ORAL
Qty: 90 TABLET | Refills: 1 | Status: SHIPPED | OUTPATIENT
Start: 2022-11-17

## 2022-11-24 DIAGNOSIS — I10 ESSENTIAL HYPERTENSION: ICD-10-CM

## 2022-11-28 RX ORDER — AMLODIPINE BESYLATE 2.5 MG/1
TABLET ORAL
Qty: 90 TABLET | Refills: 1 | Status: SHIPPED | OUTPATIENT
Start: 2022-11-28

## 2022-12-02 ENCOUNTER — TELEPHONE (OUTPATIENT)
Dept: INTERNAL MEDICINE | Facility: CLINIC | Age: 87
End: 2022-12-02

## 2022-12-02 NOTE — TELEPHONE ENCOUNTER
Provider: VERMEESCH     Caller: ESPERANZA GUZMAN     Relationship to Patient: SON     Phone Number: [phone]    Reason for Call: PATIENT'S SON IS ASKING IF THE PATIENT HAS HAD A FLU VACCINE.  IF NOT, WHICH ONE SHOULD SHE GET?

## 2022-12-07 ENCOUNTER — TELEPHONE (OUTPATIENT)
Dept: INTERNAL MEDICINE | Facility: CLINIC | Age: 87
End: 2022-12-07

## 2022-12-07 NOTE — TELEPHONE ENCOUNTER
Caller: Denny Maurice (POA)    Relationship to patient: Emergency Contact    Best call back number: [phone]    Patient is needing:     CALLER WANTED TO ADVISE CLINICAL TEAM THAT PATIENT HAS RECEIVED HER HIGH DOSE FLU SHOT ON 12/03/2022.

## 2022-12-30 ENCOUNTER — TELEMEDICINE (OUTPATIENT)
Dept: INTERNAL MEDICINE | Facility: CLINIC | Age: 87
End: 2022-12-30

## 2022-12-30 VITALS
DIASTOLIC BLOOD PRESSURE: 92 MMHG | HEART RATE: 89 BPM | SYSTOLIC BLOOD PRESSURE: 178 MMHG | BODY MASS INDEX: 26.08 KG/M2 | OXYGEN SATURATION: 97 % | WEIGHT: 147.2 LBS | HEIGHT: 63 IN | TEMPERATURE: 97.7 F

## 2022-12-30 DIAGNOSIS — F02.818 LATE ONSET ALZHEIMER'S DISEASE WITH BEHAVIORAL DISTURBANCE: ICD-10-CM

## 2022-12-30 DIAGNOSIS — F32.0 CURRENT MILD EPISODE OF MAJOR DEPRESSIVE DISORDER WITHOUT PRIOR EPISODE: ICD-10-CM

## 2022-12-30 DIAGNOSIS — I10 ESSENTIAL HYPERTENSION: Primary | ICD-10-CM

## 2022-12-30 DIAGNOSIS — G30.1 LATE ONSET ALZHEIMER'S DISEASE WITH BEHAVIORAL DISTURBANCE: ICD-10-CM

## 2022-12-30 DIAGNOSIS — E03.9 ACQUIRED HYPOTHYROIDISM: ICD-10-CM

## 2022-12-30 PROCEDURE — 99214 OFFICE O/P EST MOD 30 MIN: CPT | Performed by: INTERNAL MEDICINE

## 2022-12-30 NOTE — PROGRESS NOTES
Chief Complaint   Patient presents with   • Follow-up     For Hypothyroidism, depression, HTN, and Alzheimer's disease.      Subjective   Adelia Maurice is a 89 y.o. female.     History of Present Illness  Telemedicine video visit with patient from her home today.  I am in my office at [facility].    PMH of hypertension, HLD, hypothyroidism, vitamin D/B12 deficiency, Alzheimer's dementia and depression.  Hypertension/HLD- her BP is elevated today.  No complaints of CP, SOA, palpitations or edema.  Hypothyroid-patient is compliant with medication every day.  Family provides medication to patient. She is not constipated, she gets plenty of fiber and this is helpful.  Family has not noted any difficulty swallowing, no hair loss.  Her weight remains stable, she has a good appetite.  Vitamin D/B12 deficiency-she receives her vitamins as directed.  Alzheimer's dementia/depression-she remains on Namenda for worsening dementia.  She is also on Remeron and Seroquel to help with sleep issues and depression. She sleeps a lot during the day.  She is eating anything that is sweet or anything she can put in ketchup.  She is on miralax to keep BMs soft.  She has had prior bowel resection and has issues with constipation if she is not kept soft.  She does not like water, but does like to drink juice.   Has some left ankle pain on and off.  She previously had an area of redness but this has resolved.  She only complains of ankle pain when they try to put shoes on her.       The following portions of the patient's history were reviewed and updated as appropriate: allergies, current medications, past family history, past medical history, past social history, past surgical history and problem list.    Review of Systems   Constitutional: Negative for activity change, appetite change and unexpected weight change.   Eyes: Negative for visual disturbance.   Respiratory: Negative for shortness of breath.   "  Cardiovascular: Negative for chest pain, palpitations and leg swelling.   Gastrointestinal: Negative for abdominal pain.   Genitourinary:        Urine incontinence   Musculoskeletal: Positive for arthralgias. Negative for back pain.        Left ankle pain   Neurological: Negative for headaches.   Psychiatric/Behavioral: Positive for confusion. Negative for dysphoric mood and sleep disturbance. The patient is nervous/anxious.        Objective   /92   Pulse 89   Temp 97.7 °F (36.5 °C)   Ht 160 cm (62.99\")   Wt 66.8 kg (147 lb 3.2 oz)   SpO2 97%   BMI 26.08 kg/m²   Body mass index is 26.08 kg/m².  Physical Exam  Vitals and nursing note reviewed.   Constitutional:       General: She is not in acute distress.     Comments: Pleasant elderly female, she appears slightly irritated today   HENT:      Head: Normocephalic and atraumatic.      Right Ear: External ear normal.      Left Ear: External ear normal.   Eyes:      General:         Right eye: No discharge.         Left eye: No discharge.   Pulmonary:      Effort: Pulmonary effort is normal. No respiratory distress.   Neurological:      Mental Status: She is alert.      Cranial Nerves: No cranial nerve deficit.   Psychiatric:         Attention and Perception: She is inattentive.         Mood and Affect: Affect is flat.         Speech: She is noncommunicative.         Behavior: Behavior is agitated.         Cognition and Memory: Cognition is impaired. Memory is impaired.         Assessment & Plan   Adelia Maurice is here today and the following problems have been addressed:      Diagnoses and all orders for this visit:    1. Essential hypertension (Primary)    2. Acquired hypothyroidism    3. Current mild episode of major depressive disorder without prior episode (HCC)    4. Late onset Alzheimer's disease with behavioral disturbance (HCC)        Follow heart healthy/low salt diet as much as possible  Monitor blood pressure on occasion-blood pressure " elevated today as family states patient was irritated and machine constantly gave error reading.  They will start to monitor blood pressure more often  Encouraged chair exercises for range of motion  Take all medications as prescribed  Continue Namenda and Remeron, mood is stable and memory is slowly worsening.  She remains on Seroquel and agitation is minimal  Labs will be due in 4 months with Medicare wellness visit  All vaccines are currently up-to-date    Return in about 4 months (around 4/30/2023) for Medicare Wellness.      Marilyn K. Vermeesch, MD     Time devoted to visit: 24 minutes including time to review previous record, with 75% of time devoted to direct discussion  Patient presents during COVID-19 pandemic/federally declared [facility] of public health emergency.  This service was conducted via telemedicine video visit via [employer]      Please note that portions of this note were completed with a voice recognition program.  Efforts were made to edit dictation, but occasionally words are mistranscribed.You have chosen to receive care through a telehealth visit.  Do you consent to use a video/audio connection for your medical care today? Yes  Active Parties: Marilyn Vermeesch (PCP), Tara Whipple (CMA), Denny (Pt's son), and Adelia.   Video visit done via [email or web address].

## 2023-01-04 RX ORDER — LISINOPRIL 10 MG/1
TABLET ORAL
Qty: 90 TABLET | Refills: 1 | Status: SHIPPED | OUTPATIENT
Start: 2023-01-04

## 2023-05-18 ENCOUNTER — TELEPHONE (OUTPATIENT)
Dept: INTERNAL MEDICINE | Facility: CLINIC | Age: 88
End: 2023-05-18

## 2023-05-18 DIAGNOSIS — G30.1 LATE ONSET ALZHEIMER'S DISEASE WITH BEHAVIORAL DISTURBANCE: ICD-10-CM

## 2023-05-18 DIAGNOSIS — I10 ESSENTIAL HYPERTENSION: ICD-10-CM

## 2023-05-18 DIAGNOSIS — F02.818 LATE ONSET ALZHEIMER'S DISEASE WITH BEHAVIORAL DISTURBANCE: ICD-10-CM

## 2023-05-18 DIAGNOSIS — F32.0 CURRENT MILD EPISODE OF MAJOR DEPRESSIVE DISORDER WITHOUT PRIOR EPISODE: ICD-10-CM

## 2023-05-18 DIAGNOSIS — E03.9 ACQUIRED HYPOTHYROIDISM: Primary | ICD-10-CM

## 2023-05-18 NOTE — TELEPHONE ENCOUNTER
Caller: Denny Maurice (POA)    Relationship: Emergency Contact    Best call back number: [phone]    Requested Prescriptions:   Requested Prescriptions      No prescriptions requested or ordered in this encounter      lisinopril (PRINIVIL,ZESTRIL) 10 MG tablet    mirtazapine (REMERON) 15 MG tablet    QUEtiapine (SEROquel) 25 MG tablet    memantine (NAMENDA) 10 MG tablet    amLODIPine (NORVASC) 2.5 MG tablet    Pharmacy where request should be sent:      [facility] [id number] AT [facility] [phone] [facility] [phone]      Last office visit with prescribing clinician: Visit date not found   Last telemedicine visit with prescribing clinician: 1/4/2023   Next office visit with prescribing clinician: Visit date not found     Additional details provided by patient:     PATIENT IS COMPLETELY OUT     Does the patient have less than a 3 day supply:    [x] Yes  [] No    Would you like a call back once the refill request has been completed: [] Yes [] No    If the office needs to give you a call back, can they leave a voicemail: [] Yes [] No    Raissa Smith, PCT   05/18/23 08:23 EDT

## 2023-05-18 NOTE — TELEPHONE ENCOUNTER
Caller: Denny Maurice (POA)    Relationship: Emergency Contact     Best call back number: [phone]    What is the best time to reach you: ANY    Who are you requesting to speak with (clinical staff, provider,  specific staff member):  VENUS     What was the call regarding:     CALL ABOUT OVER THE COUNTER EYE VITAMINS    Do you require a callback: YES      ALSO PATIENT STATES THEY NEVER RECEIVED THE LETTER ABOUT VERMEESCH RETIRING

## 2023-05-18 NOTE — TELEPHONE ENCOUNTER
Can you assist with this? She also has questions regarding otc vitamins for eye health? Do you recommend a vitamin for the eyes?

## 2023-05-19 NOTE — TELEPHONE ENCOUNTER
Denny was not aware of refills available on the medications they requested. He stated that they had discussed a vitamin for eye health with Dr. Vermeesch but couldn't remember what it was.

## 2023-05-22 RX ORDER — LISINOPRIL 10 MG/1
10 TABLET ORAL DAILY
Qty: 30 TABLET | Refills: 0 | Status: SHIPPED | OUTPATIENT
Start: 2023-05-22 | End: 2023-06-22

## 2023-05-22 RX ORDER — MEMANTINE HYDROCHLORIDE 10 MG/1
10 TABLET ORAL 2 TIMES DAILY
Qty: 60 TABLET | Refills: 0 | Status: SHIPPED | OUTPATIENT
Start: 2023-05-22 | End: 2023-08-01 | Stop reason: SDUPTHER

## 2023-05-22 RX ORDER — MIRTAZAPINE 15 MG/1
15 TABLET, FILM COATED ORAL NIGHTLY
Qty: 30 TABLET | Refills: 0 | Status: SHIPPED | OUTPATIENT
Start: 2023-05-22 | End: 2023-06-20

## 2023-05-22 RX ORDER — QUETIAPINE FUMARATE 25 MG/1
TABLET, FILM COATED ORAL
Qty: 60 TABLET | Refills: 0 | Status: SHIPPED | OUTPATIENT
Start: 2023-05-22 | End: 2023-08-01 | Stop reason: SDUPTHER

## 2023-05-22 RX ORDER — LEVOTHYROXINE SODIUM 0.05 MG/1
50 TABLET ORAL DAILY
Qty: 30 TABLET | Refills: 0 | Status: SHIPPED | OUTPATIENT
Start: 2023-05-22 | End: 2023-06-23

## 2023-05-22 RX ORDER — AMLODIPINE BESYLATE 2.5 MG/1
2.5 TABLET ORAL DAILY
Qty: 30 TABLET | Refills: 0 | Status: SHIPPED | OUTPATIENT
Start: 2023-05-22 | End: 2023-06-22

## 2023-05-22 NOTE — TELEPHONE ENCOUNTER
I called and spoke to Denny, he states AREDs is something she has used but, her retinal specialist DC'd the medication. Since then he also states the doctor states there is nothing more that will help her. I sent Refills for all medications that was needed for 30 day supply.

## 2023-05-22 NOTE — TELEPHONE ENCOUNTER
"Tried to call Denny Maurice.  No answer, did not leave voicemail.  AREDS 2 supplements are often recommended for patients with macular degeneration.  If patient has never smoked, she may take AREDS supplement (not AREDS \"2\") which contain beta-carotene (linked to increased risk of lung cancer in patients who smoke or use to smoke).  "

## 2023-06-29 ENCOUNTER — TELEPHONE (OUTPATIENT)
Dept: INTERNAL MEDICINE | Facility: CLINIC | Age: 88
End: 2023-06-29

## 2023-06-29 NOTE — TELEPHONE ENCOUNTER
Caller: Josafat DumontRAMEZDenny VASQUEZ    Relationship: Emergency Contact    Best call back number: [phone]    What medications are you currently taking:   Current Outpatient Medications on File Prior to Visit   Medication Sig Dispense Refill    amLODIPine (NORVASC) 2.5 MG tablet TAKE 1 TABLET BY MOUTH DAILY 30 tablet 0    aspirin 81 MG tablet Take 1 tablet by mouth Daily. Take one tab every other day 30 tablet 0    cholecalciferol (VITAMIN D3) 1000 units tablet Take 2 tablets by mouth Daily.      latanoprost (XALATAN) 0.005 % ophthalmic solution Administer 1 drop to both eyes Every Night.      levothyroxine (SYNTHROID, LEVOTHROID) 50 MCG tablet TAKE 1 TABLET BY MOUTH DAILY 30 tablet 0    lisinopril (PRINIVIL,ZESTRIL) 10 MG tablet TAKE 1 TABLET BY MOUTH DAILY 30 tablet 0    memantine (NAMENDA) 10 MG tablet Take 1 tablet by mouth 2 (Two) Times a Day. 60 tablet 0    mirtazapine (REMERON) 15 MG tablet TAKE ONE TABLET BY MOUTH ONCE NIGHTLY 30 tablet 0    QUEtiapine (SEROquel) 25 MG tablet Take one tablet by mouth every AM, & in PM if still agitated. 60 tablet 0    vitamin B-12 (CYANOCOBALAMIN) 100 MCG tablet Take 10 mcg by mouth 3 (Three) Times a Week. Takes Monday, Wednesday and friday       No current facility-administered medications on file prior to visit.        Which medication are you concerned about:     mirtazapine (REMERON) 15 MG tablet     What are your concerns:     LORI STATED TO STOP TAKING MIRTAZAPINE, BUT RUPESH HAS PRESCRIPTION WRITTEN BY [facility] FOR MIRTAZAPINE.  ROOSEVELT MATA WANTS TO MAKE SURE HE UNDERSTOOD DR SANDOVAL CORRECTLY   HE THINKS SHE SAID TO STOP TAKING MIRTAZAPINE

## 2023-06-29 NOTE — TELEPHONE ENCOUNTER
Patient was seen by you on 06/20 and the Mirtazapine was discontinued. But was sent in as a refill on 06/22.  Please advise.

## 2023-07-27 DIAGNOSIS — I10 ESSENTIAL HYPERTENSION: ICD-10-CM

## 2023-07-27 RX ORDER — AMLODIPINE BESYLATE 2.5 MG/1
2.5 TABLET ORAL DAILY
Qty: 30 TABLET | Refills: 0 | Status: SHIPPED | OUTPATIENT
Start: 2023-07-27

## 2023-08-01 DIAGNOSIS — I10 ESSENTIAL HYPERTENSION: ICD-10-CM

## 2023-08-01 DIAGNOSIS — G30.1 LATE ONSET ALZHEIMER'S DISEASE WITH BEHAVIORAL DISTURBANCE: ICD-10-CM

## 2023-08-01 DIAGNOSIS — E03.9 ACQUIRED HYPOTHYROIDISM: ICD-10-CM

## 2023-08-01 DIAGNOSIS — F02.818 LATE ONSET ALZHEIMER'S DISEASE WITH BEHAVIORAL DISTURBANCE: ICD-10-CM

## 2023-08-01 DIAGNOSIS — F32.0 CURRENT MILD EPISODE OF MAJOR DEPRESSIVE DISORDER WITHOUT PRIOR EPISODE: ICD-10-CM

## 2023-08-01 RX ORDER — LISINOPRIL 10 MG/1
10 TABLET ORAL DAILY
Qty: 90 TABLET | Refills: 1 | Status: SHIPPED | OUTPATIENT
Start: 2023-08-01

## 2023-08-01 RX ORDER — LISINOPRIL 10 MG/1
10 TABLET ORAL DAILY
Qty: 30 TABLET | Refills: 0 | Status: SHIPPED | OUTPATIENT
Start: 2023-08-01 | End: 2023-08-01 | Stop reason: SDUPTHER

## 2023-08-01 RX ORDER — QUETIAPINE FUMARATE 25 MG/1
TABLET, FILM COATED ORAL
Qty: 180 TABLET | Refills: 1 | Status: SHIPPED | OUTPATIENT
Start: 2023-08-01

## 2023-08-01 RX ORDER — MEMANTINE HYDROCHLORIDE 10 MG/1
10 TABLET ORAL 2 TIMES DAILY
Qty: 180 TABLET | Refills: 1 | Status: SHIPPED | OUTPATIENT
Start: 2023-08-01

## 2023-08-01 RX ORDER — AMLODIPINE BESYLATE 2.5 MG/1
2.5 TABLET ORAL DAILY
Qty: 90 TABLET | Refills: 1 | Status: SHIPPED | OUTPATIENT
Start: 2023-08-01

## 2023-08-01 RX ORDER — LEVOTHYROXINE SODIUM 0.05 MG/1
50 TABLET ORAL DAILY
Qty: 90 TABLET | Refills: 1 | Status: SHIPPED | OUTPATIENT
Start: 2023-08-01

## 2023-12-26 ENCOUNTER — OFFICE VISIT (OUTPATIENT)
Dept: INTERNAL MEDICINE | Facility: CLINIC | Age: 88
End: 2023-12-26
Payer: MEDICARE

## 2023-12-26 VITALS
HEART RATE: 90 BPM | BODY MASS INDEX: 25.55 KG/M2 | OXYGEN SATURATION: 97 % | DIASTOLIC BLOOD PRESSURE: 72 MMHG | WEIGHT: 144.2 LBS | SYSTOLIC BLOOD PRESSURE: 140 MMHG

## 2023-12-26 DIAGNOSIS — L98.9 SKIN LESION: ICD-10-CM

## 2023-12-26 DIAGNOSIS — G30.1 LATE ONSET ALZHEIMER'S DISEASE WITH BEHAVIORAL DISTURBANCE: Primary | ICD-10-CM

## 2023-12-26 DIAGNOSIS — Z23 NEED FOR INFLUENZA VACCINATION: ICD-10-CM

## 2023-12-26 DIAGNOSIS — F02.818 LATE ONSET ALZHEIMER'S DISEASE WITH BEHAVIORAL DISTURBANCE: Primary | ICD-10-CM

## 2023-12-26 RX ORDER — MIRTAZAPINE 7.5 MG/1
7.5 TABLET, FILM COATED ORAL NIGHTLY
Qty: 90 TABLET | Refills: 0 | Status: SHIPPED | OUTPATIENT
Start: 2023-12-26

## 2023-12-26 RX ORDER — DIAPER,BRIEF,INFANT-TODD,DISP
1 EACH MISCELLANEOUS 2 TIMES DAILY
Qty: 15 G | Refills: 0 | Status: SHIPPED | OUTPATIENT
Start: 2023-12-26

## 2023-12-26 NOTE — PROGRESS NOTES
Subjective   Adelia Maurice is a 89 y.o. female.     History of Present Illness  Patient presents for her 6-month follow-up.    Dementia with behavioral disturbances-patient has been more down acting and fidgety since stopping the Remeron.  She did better when she was on the Remeron.  Stopping the Remeron has not made her sleeping better she still sleeping a lot.  Patient had a skin lesion on her nose for the past week and a half.  States has been scaly and itchy.  Her caregiver states that some of it is really dry skin because he cannot get her to drink any water that is not coffee      The following portions of the patient's history were reviewed and updated as appropriate: allergies, current medications, past family history, past medical history, past social history, past surgical history, and problem list.    Review of Systems   All other systems reviewed and are negative.      Objective   Physical Exam  Vitals and nursing note reviewed.   Constitutional:       Appearance: Normal appearance.   HENT:      Head: Normocephalic and atraumatic.      Right Ear: External ear normal.      Left Ear: External ear normal.      Nose: Nose normal.      Mouth/Throat:      Mouth: Mucous membranes are moist.      Pharynx: Oropharynx is clear. No oropharyngeal exudate or posterior oropharyngeal erythema.   Eyes:      Extraocular Movements: Extraocular movements intact.      Conjunctiva/sclera: Conjunctivae normal.      Pupils: Pupils are equal, round, and reactive to light.   Cardiovascular:      Rate and Rhythm: Normal rate and regular rhythm.      Pulses: Normal pulses.      Heart sounds: Normal heart sounds.   Pulmonary:      Effort: Pulmonary effort is normal.      Breath sounds: Normal breath sounds.   Abdominal:      General: Abdomen is flat. Bowel sounds are normal.      Palpations: Abdomen is soft.   Musculoskeletal:         General: Normal range of motion.      Cervical back: Normal range of motion.   Skin:      General: Skin is warm.      Capillary Refill: Capillary refill takes less than 2 seconds.      Comments: 2 mm x 2 mm red raised area on nasal bridge   Neurological:      General: No focal deficit present.      Mental Status: She is alert and oriented to person, place, and time. Mental status is at baseline.   Psychiatric:         Mood and Affect: Mood normal.         Behavior: Behavior normal.         Thought Content: Thought content normal.         Judgment: Judgment normal.         Assessment & Plan   Diagnoses and all orders for this visit:    1. Late onset Alzheimer's disease with behavioral disturbance (Primary)  -     mirtazapine (REMERON) 7.5 MG tablet; Take 1 tablet by mouth Every Night.  Dispense: 90 tablet; Refill: 0    2. Skin lesion  -     hydrocortisone 0.5 % cream; Apply 1 application  topically to the appropriate area as directed 2 (Two) Times a Day.  Dispense: 15 g; Refill: 0    3. Need for influenza vaccination  -     Fluzone High-Dose 65+yrs ([id number])       Restart Remeron  Hydrocortisone for little lesion on days.  Counseled not to use it on the rest of the face just above the lesion.  High-dose flu shot given

## 2024-01-11 ENCOUNTER — TELEPHONE (OUTPATIENT)
Dept: INTERNAL MEDICINE | Facility: CLINIC | Age: 89
End: 2024-01-11
Payer: MEDICARE

## 2024-01-11 NOTE — TELEPHONE ENCOUNTER
Caller: Denny Maurice (POA)    Relationship: Emergency Contact    Best call back number: [phone]     What is the best time to reach you: NOT REQESTED    Who are you requesting to speak with (clinical staff, provider,  specific staff member):     Do you know the name of the person who called:     What was the call regarding: SON CALLED IN TO [facility]VISE PCP TO ADD INTO PATIENTS MEDICAL RECORD THAT SHE HAD THE RSV AND MEDERNA SPIKE VAX VACCINE ON 01-04-24    Is it okay if the provider responds through MyChart:         st.”

## 2024-02-15 DIAGNOSIS — F02.818 LATE ONSET ALZHEIMER'S DISEASE WITH BEHAVIORAL DISTURBANCE: ICD-10-CM

## 2024-02-15 DIAGNOSIS — G30.1 LATE ONSET ALZHEIMER'S DISEASE WITH BEHAVIORAL DISTURBANCE: ICD-10-CM

## 2024-02-15 RX ORDER — MEMANTINE HYDROCHLORIDE 10 MG/1
10 TABLET ORAL 2 TIMES DAILY
Qty: 180 TABLET | Refills: 1 | Status: SHIPPED | OUTPATIENT
Start: 2024-02-15

## 2024-02-29 DIAGNOSIS — I10 ESSENTIAL HYPERTENSION: ICD-10-CM

## 2024-02-29 RX ORDER — AMLODIPINE BESYLATE 2.5 MG/1
2.5 TABLET ORAL DAILY
Qty: 90 TABLET | Refills: 1 | OUTPATIENT
Start: 2024-02-29

## 2024-02-29 RX ORDER — AMLODIPINE BESYLATE 2.5 MG/1
2.5 TABLET ORAL DAILY
Qty: 90 TABLET | Refills: 1 | Status: SHIPPED | OUTPATIENT
Start: 2024-02-29

## 2024-03-21 DIAGNOSIS — F02.818 LATE ONSET ALZHEIMER'S DISEASE WITH BEHAVIORAL DISTURBANCE: ICD-10-CM

## 2024-03-21 DIAGNOSIS — L98.9 SKIN LESION: ICD-10-CM

## 2024-03-21 DIAGNOSIS — G30.1 LATE ONSET ALZHEIMER'S DISEASE WITH BEHAVIORAL DISTURBANCE: ICD-10-CM

## 2024-03-21 RX ORDER — DIAPER,BRIEF,INFANT-TODD,DISP
EACH MISCELLANEOUS
Qty: 28.4 G | Refills: 3 | Status: SHIPPED | OUTPATIENT
Start: 2024-03-21

## 2024-03-21 RX ORDER — MIRTAZAPINE 7.5 MG/1
7.5 TABLET, FILM COATED ORAL NIGHTLY
Qty: 90 TABLET | Refills: 0 | Status: SHIPPED | OUTPATIENT
Start: 2024-03-21

## 2024-03-28 DIAGNOSIS — I10 ESSENTIAL HYPERTENSION: ICD-10-CM

## 2024-03-29 RX ORDER — LISINOPRIL 10 MG/1
10 TABLET ORAL DAILY
Qty: 90 TABLET | Refills: 1 | Status: SHIPPED | OUTPATIENT
Start: 2024-03-29

## 2024-04-04 NOTE — TELEPHONE ENCOUNTER
Denny notified   Surgery Instructions     Your surgery is scheduled for 4/24/24 at [facility] on the   ground floor of the Ambulatory building. You should arrive at 5:30 AM  at the   Ambulatory Care Center located at [address].    Pre Op Testing: TODAY     _X___ Lab  (1st floor clinic)   _X___ EKG  (2nd floor clinic)  _X___ Chest xray (Imaging Center)     Pre Op Clearance: DR STAYC 4/16 @11:00    Our office will contact you the day before surgery with your arrival time.  DO NOT eat or drink anything after midnight the night before surgery (this includes gum, candy, chewing tobacco, etc).  You CAN NOT drive after surgery, please arrange for someone to drive you.  Bring all medication in their original bottles.  Bathe with Hibiclens the night or morning before your surgery.  The morning of your surgery ONLY take blood pressure, heart, acid reflux, or thyroid (if you take a morning dose) medication with a sip of water.   Be sure to have stopped your blood thinner medication at the appropriate time, as instructed.  Bring your C-Pap machine if you have one.  All jewelry, piercings, or false eyelashes MUST be removed prior to surgery.

## 2024-06-06 DIAGNOSIS — G30.1 LATE ONSET ALZHEIMER'S DISEASE WITH BEHAVIORAL DISTURBANCE: ICD-10-CM

## 2024-06-06 DIAGNOSIS — F02.818 LATE ONSET ALZHEIMER'S DISEASE WITH BEHAVIORAL DISTURBANCE: ICD-10-CM

## 2024-06-06 RX ORDER — MIRTAZAPINE 7.5 MG/1
7.5 TABLET, FILM COATED ORAL NIGHTLY
Qty: 90 TABLET | Refills: 0 | Status: SHIPPED | OUTPATIENT
Start: 2024-06-06

## 2024-08-05 ENCOUNTER — OFFICE VISIT (OUTPATIENT)
Dept: INTERNAL MEDICINE | Facility: CLINIC | Age: 89
End: 2024-08-05
Payer: MEDICARE

## 2024-08-05 VITALS
HEIGHT: 63 IN | OXYGEN SATURATION: 96 % | DIASTOLIC BLOOD PRESSURE: 80 MMHG | RESPIRATION RATE: 20 BRPM | BODY MASS INDEX: 25.48 KG/M2 | WEIGHT: 143.8 LBS | TEMPERATURE: 98.2 F | HEART RATE: 74 BPM | SYSTOLIC BLOOD PRESSURE: 150 MMHG

## 2024-08-05 DIAGNOSIS — Z00.00 ENCOUNTER FOR MEDICARE ANNUAL WELLNESS EXAM: Primary | ICD-10-CM

## 2024-08-05 DIAGNOSIS — F32.0 CURRENT MILD EPISODE OF MAJOR DEPRESSIVE DISORDER WITHOUT PRIOR EPISODE: ICD-10-CM

## 2024-08-05 DIAGNOSIS — I10 ESSENTIAL HYPERTENSION: ICD-10-CM

## 2024-08-05 DIAGNOSIS — G30.1 LATE ONSET ALZHEIMER'S DISEASE WITH BEHAVIORAL DISTURBANCE: ICD-10-CM

## 2024-08-05 DIAGNOSIS — F02.818 LATE ONSET ALZHEIMER'S DISEASE WITH BEHAVIORAL DISTURBANCE: ICD-10-CM

## 2024-08-05 DIAGNOSIS — E03.9 ACQUIRED HYPOTHYROIDISM: ICD-10-CM

## 2024-08-05 PROCEDURE — G0439 PPPS, SUBSEQ VISIT: HCPCS | Performed by: STUDENT IN AN ORGANIZED HEALTH CARE EDUCATION/TRAINING PROGRAM

## 2024-08-05 PROCEDURE — 1126F AMNT PAIN NOTED NONE PRSNT: CPT | Performed by: STUDENT IN AN ORGANIZED HEALTH CARE EDUCATION/TRAINING PROGRAM

## 2024-08-05 PROCEDURE — 1160F RVW MEDS BY RX/DR IN RCRD: CPT | Performed by: STUDENT IN AN ORGANIZED HEALTH CARE EDUCATION/TRAINING PROGRAM

## 2024-08-05 PROCEDURE — 1159F MED LIST DOCD IN RCRD: CPT | Performed by: STUDENT IN AN ORGANIZED HEALTH CARE EDUCATION/TRAINING PROGRAM

## 2024-08-05 PROCEDURE — 99214 OFFICE O/P EST MOD 30 MIN: CPT | Performed by: STUDENT IN AN ORGANIZED HEALTH CARE EDUCATION/TRAINING PROGRAM

## 2024-08-05 PROCEDURE — 1170F FXNL STATUS ASSESSED: CPT | Performed by: STUDENT IN AN ORGANIZED HEALTH CARE EDUCATION/TRAINING PROGRAM

## 2024-08-05 RX ORDER — QUETIAPINE FUMARATE 25 MG/1
25 TABLET, FILM COATED ORAL NIGHTLY
Qty: 90 TABLET | Refills: 0 | Status: SHIPPED | OUTPATIENT
Start: 2024-08-05

## 2024-08-05 RX ORDER — LEVOTHYROXINE SODIUM 0.05 MG/1
50 TABLET ORAL DAILY
Qty: 90 TABLET | Refills: 1 | Status: SHIPPED | OUTPATIENT
Start: 2024-08-05 | End: 2024-08-06

## 2024-08-05 RX ORDER — AMLODIPINE BESYLATE 2.5 MG/1
2.5 TABLET ORAL DAILY
Qty: 90 TABLET | Refills: 1 | Status: SHIPPED | OUTPATIENT
Start: 2024-08-05

## 2024-08-05 RX ORDER — MIRTAZAPINE 7.5 MG/1
7.5 TABLET, FILM COATED ORAL NIGHTLY
Qty: 90 TABLET | Refills: 0 | Status: SHIPPED | OUTPATIENT
Start: 2024-08-05

## 2024-08-05 RX ORDER — LISINOPRIL 10 MG/1
10 TABLET ORAL DAILY
Qty: 90 TABLET | Refills: 1 | Status: SHIPPED | OUTPATIENT
Start: 2024-08-05

## 2024-08-05 RX ORDER — MEMANTINE HYDROCHLORIDE 10 MG/1
10 TABLET ORAL 2 TIMES DAILY
Qty: 180 TABLET | Refills: 1 | Status: SHIPPED | OUTPATIENT
Start: 2024-08-05

## 2024-08-05 NOTE — PROGRESS NOTES
Subjective   The ABCs of the Annual Wellness Visit  Medicare Wellness Visit      Adelia Maurice is a 89 y.o. patient who presents for a Medicare Wellness Visit.    The following portions of the patient's history were reviewed and   updated as appropriate: allergies, current medications, past family history, past medical history, past social history, past surgical history, and problem list.    Compared to one year ago, the patient's physical   health is the same.  Compared to one year ago, the patient's mental   health is worse.    Recent Hospitalizations:  She was not admitted to the hospital during the last year.     Current Medical Providers:  Patient Care Team:  Jennifer Nichols DO as PCP - General (Family Medicine)    Outpatient Medications Prior to Visit   Medication Sig Dispense Refill    aspirin 81 MG tablet Take 1 tablet by mouth Daily. Take one tab every other day 30 tablet 0    cholecalciferol (VITAMIN D3) 1000 units tablet Take 2 tablets by mouth Daily.      hydrocortisone 0.5 % cream APPLY TO THE AFFECTED AREA(S) 2 TIMES A DAY AS DIRECTED (Patient taking differently: 1 Application As Needed for Rash.) 28.4 g 3    latanoprost (XALATAN) 0.005 % ophthalmic solution Administer 1 drop to both eyes Every Night.      vitamin B-12 (CYANOCOBALAMIN) 100 MCG tablet Take 10 mcg by mouth 3 (Three) Times a Week. Takes Monday, Wednesday and friday      amLODIPine (NORVASC) 2.5 MG tablet TAKE 1 TABLET BY MOUTH DAILY 90 tablet 1    levothyroxine (SYNTHROID, LEVOTHROID) 50 MCG tablet Take 1 tablet by mouth Daily. 90 tablet 1    lisinopril (PRINIVIL,ZESTRIL) 10 MG tablet TAKE 1 TABLET BY MOUTH DAILY 90 tablet 1    memantine (NAMENDA) 10 MG tablet TAKE 1 TABLET BY MOUTH TWICE A  tablet 1    mirtazapine (REMERON) 7.5 MG tablet TAKE ONE TABLET BY MOUTH ONCE NIGHTLY 90 tablet 0    QUEtiapine (SEROquel) 25 MG tablet Take one tablet by mouth every AM, & in PM if still agitated. (Patient taking differently: Take 1  "tablet by mouth Every Night. Take one tablet by mouth every in PM if still agitated.) 180 tablet 1     No facility-administered medications prior to visit.     No opioid medication identified on active medication list. I have reviewed chart for other potential  high risk medication/s and harmful drug interactions in the elderly.      Aspirin is on active medication list. Aspirin use is indicated based on review of current medical condition/s. Pros and cons of this therapy have been discussed today. Benefits of this medication outweigh potential harm.  Patient has been encouraged to continue taking this medication.  .      Patient Active Problem List   Diagnosis    Depression    Essential hypertension    Neuralgia    Macular degeneration    Arthritis of hip    Carotid bruit    Fatigue    BMI 29.0-29.9,adult    Environmental allergies    Vitamin D deficiency    Vitamin B 12 deficiency    Late onset Alzheimer's disease with behavioral disturbance    Encounter for Medicare annual wellness exam    Acquired hypothyroidism    Intrinsic eczema    Erythema intertrigo     Advance Care Planning (Click this link to access ACP Navigator)  Advance Directive is on file.  ACP discussion was held with the patient during this visit. Patient has an advance directive in EMR which is still valid.         Objective   Vitals:    08/05/24 1021 08/05/24 1037   BP: 150/80    Pulse: 50 74  Comment: different hand   Resp: 20    Temp: 98.2 °F (36.8 °C)    TempSrc: Oral    SpO2: 96%    Weight: 65.2 kg (143 lb 12.8 oz)    Height: 160 cm (62.99\")    PainSc: 0-No pain        Estimated body mass index is 25.48 kg/m² as calculated from the following:    Height as of this encounter: 160 cm (62.99\").    Weight as of this encounter: 65.2 kg (143 lb 12.8 oz).    BMI is >= 25 and <30. (Overweight) The following options were offered after discussion;: none (medical contraindication)        Does the patient have evidence of cognitive impairment? Yes       "                                                                                          Health  Risk Assessment    Smoking Status:  Social History     Tobacco Use   Smoking Status Never   Smokeless Tobacco Never     Alcohol Consumption:  Social History     Substance and Sexual Activity   Alcohol Use No     Fall Risk Screen:  JULISSAADI Fall Risk Assessment was completed, and patient is at MODERATE risk for falls. Assessment completed on:2024    Depression Screenin/5/2024    10:31 AM   PHQ-2/PHQ-9 Depression Screening   Little Interest or Pleasure in Doing Things 1-->several days   Feeling Down, Depressed or Hopeless 1-->several days   Trouble Falling or Staying Asleep, or Sleeping Too Much 3-->nearly every day   Feeling Tired or Having Little Energy 3-->nearly every day   Poor Appetite or Overeating 0-->not at all   Feeling Bad about Yourself - or that You are a Failure or Have Let Yourself or Your Family Down 0-->not at all   Trouble Concentrating on Things, Such as Reading the Newspaper or Watching Television 3-->nearly every day   Moving or Speaking So Slowly that Other People Could Have Noticed? Or the Opposite - Being So Fidgety 3-->nearly every day   Thoughts that You Would be Better Off Dead or of Hurting Yourself in Some Way 0-->not at all   PHQ-9: Brief Depression Severity Measure Score 14   If You Checked Off Any Problems, How Difficult Have These Problems Made It For You to Do Your Work, Take Care of Things at Home, or Get Along with Other People? somewhat difficult     Health Habits and Functional and Cognitive Screenin/5/2024    10:51 AM   Functional & Cognitive Status   Do you have difficulty preparing food and eating? Yes   Do you have difficulty bathing yourself, getting dressed or grooming yourself? Yes   Do you have difficulty using the toilet? Yes   Do you have difficulty moving around from place to place? No   Do you have trouble with steps or getting out of a bed or a chair?  Yes   Current Diet Well Balanced Diet   Dental Exam Not up to date   Eye Exam Up to date   Exercise (times per week) 0 times per week   Current Exercises Include No Regular Exercise   Do you need help using the phone?  No   Are you deaf or do you have serious difficulty hearing?  Yes   Do you need help to go to places out of walking distance? Yes   Do you need help shopping? No   Do you need help preparing meals?  No   Do you need help with housework?  No   Do you need help with laundry? No   Do you need help taking your medications? Yes   Do you need help managing money? No   Do you ever drive or ride in a car without wearing a seat belt? No   Have you felt unusual stress, anger or loneliness in the last month? No   Who do you live with? Child   If you need help, do you have trouble finding someone available to you? No   Have you been bothered in the last four weeks by sexual problems? No   Do you have difficulty concentrating, remembering or making decisions? Yes             Age-appropriate Screening Schedule:  Refer to the list below for future screening recommendations based on patient's age, sex and/or medical conditions. Orders for these recommended tests are listed in the plan section. The patient has been provided with a written plan.    Health Maintenance List  Health Maintenance   Topic Date Due    COVID-19 Vaccine (7 - 2023-24 season) 05/04/2024    ANNUAL WELLNESS VISIT  06/20/2024    BMI FOLLOWUP  06/20/2024    INFLUENZA VACCINE  08/01/2024    RSV Vaccine - Adults  Completed    Pneumococcal Vaccine 65+  Completed    DXA SCAN  Discontinued    TDAP/TD VACCINES  Discontinued    ZOSTER VACCINE  Discontinued                                                                                                                                                CMS Preventative Services Quick Reference  Risk Factors Identified During Encounter  Dental Screening Recommended  Vision Screening Recommended    The above  "risks/problems have been discussed with the patient.  Pertinent information has been shared with the patient in the After Visit Summary.  An After Visit Summary and PPPS were made available to the patient.    Follow Up:   Next Medicare Wellness visit to be scheduled in 1 year.         Additional E&M Note during same encounter follows:  Patient has additional, significant, and separately identifiable condition(s)/problem(s) that require work above and beyond the Medicare Wellness Visit     Chief Complaint  Medicare Wellness-subsequent (No scheduled correctly)    Subjective   HPI  Adelia is also being seen today for additional medical problem/s.  Hypertension-has been well-controlled on lisinopril and amlodipine.  Denies any complaints of chest pain shortness of breath diaphoresis  Hypothyroid-has maintained good TSH levels on Synthroid.  Does not notice any new hair loss or fatigue.  Dementia-patient on Namenda.  States that memories continue to decline.  Son states she is forgetting things about her .  States that she does have some anxiety related to the dementia.  States she gets really nervous about thoughts of people breaking into her house even though none of this is ever happened.  Review of Systems   All other systems reviewed and are negative.             Objective   Vital Signs:  /80   Pulse 74 Comment: different hand  Temp 98.2 °F (36.8 °C) (Oral)   Resp 20   Ht 160 cm (62.99\")   Wt 65.2 kg (143 lb 12.8 oz)   SpO2 96%   BMI 25.48 kg/m²   Physical Exam  Vitals and nursing note reviewed.   Constitutional:       Appearance: Normal appearance.   HENT:      Head: Normocephalic and atraumatic.      Right Ear: External ear normal.      Left Ear: External ear normal.      Nose: Nose normal.      Mouth/Throat:      Mouth: Mucous membranes are moist.      Pharynx: Oropharynx is clear. No oropharyngeal exudate or posterior oropharyngeal erythema.   Eyes:      Extraocular Movements: Extraocular " movements intact.      Conjunctiva/sclera: Conjunctivae normal.      Pupils: Pupils are equal, round, and reactive to light.   Cardiovascular:      Rate and Rhythm: Normal rate and regular rhythm.      Pulses: Normal pulses.      Heart sounds: Normal heart sounds.   Pulmonary:      Effort: Pulmonary effort is normal.      Breath sounds: Normal breath sounds.   Abdominal:      General: Abdomen is flat. Bowel sounds are normal.      Palpations: Abdomen is soft.   Musculoskeletal:         General: Normal range of motion.      Cervical back: Normal range of motion.   Skin:     General: Skin is warm.      Capillary Refill: Capillary refill takes less than 2 seconds.   Neurological:      General: No focal deficit present.      Mental Status: She is alert. Mental status is at baseline.   Psychiatric:         Mood and Affect: Mood normal.         Cognition and Memory: Cognition is impaired. Memory is impaired.                 Assessment and Plan               Encounter for Medicare annual wellness exam    Essential hypertension    Acquired hypothyroidism    Late onset Alzheimer's disease with behavioral disturbance    Current mild episode of major depressive disorder without prior episode    No orders of the defined types were placed in this encounter.    New Medications Ordered This Visit   Medications    amLODIPine (NORVASC) 2.5 MG tablet     Sig: Take 1 tablet by mouth Daily.     Dispense:  90 tablet     Refill:  1    levothyroxine (SYNTHROID, LEVOTHROID) 50 MCG tablet     Sig: Take 1 tablet by mouth Daily.     Dispense:  90 tablet     Refill:  1    lisinopril (PRINIVIL,ZESTRIL) 10 MG tablet     Sig: Take 1 tablet by mouth Daily.     Dispense:  90 tablet     Refill:  1    memantine (NAMENDA) 10 MG tablet     Sig: Take 1 tablet by mouth 2 (Two) Times a Day.     Dispense:  180 tablet     Refill:  1    mirtazapine (REMERON) 7.5 MG tablet     Sig: Take 1 tablet by mouth Every Night.     Dispense:  90 tablet     Refill:  0     QUEtiapine (SEROquel) 25 MG tablet     Sig: Take 1 tablet by mouth Every Night. Take one tablet by mouth every in PM if still agitated.     Dispense:  90 tablet     Refill:  0          Follow Up   Return in about 6 months (around 2/5/2025).  Patient was given instructions and counseling regarding her condition or for health maintenance advice. Please see specific information pulled into the AVS if appropriate.

## 2024-08-06 ENCOUNTER — TELEPHONE (OUTPATIENT)
Dept: INTERNAL MEDICINE | Facility: CLINIC | Age: 89
End: 2024-08-06
Payer: MEDICARE

## 2024-08-06 LAB
ALBUMIN SERPL-MCNC: 4.1 G/DL (ref 3.5–5.2)
ALBUMIN/GLOB SERPL: 1.8 G/DL
ALP SERPL-CCNC: 89 U/L (ref 39–117)
ALT SERPL-CCNC: 5 U/L (ref 1–33)
AST SERPL-CCNC: 13 U/L (ref 1–32)
BILIRUB SERPL-MCNC: 0.4 MG/DL (ref 0–1.2)
BUN SERPL-MCNC: 16 MG/DL (ref 8–23)
BUN/CREAT SERPL: 17.2 (ref 7–25)
CALCIUM SERPL-MCNC: 8.9 MG/DL (ref 8.2–9.6)
CHLORIDE SERPL-SCNC: 103 MMOL/L (ref 98–107)
CO2 SERPL-SCNC: 26.5 MMOL/L (ref 22–29)
CREAT SERPL-MCNC: 0.93 MG/DL (ref 0.57–1)
EGFRCR SERPLBLD CKD-EPI 2021: 54.3 ML/MIN/1.73
ERYTHROCYTE [DISTWIDTH] IN BLOOD BY AUTOMATED COUNT: 13.2 % (ref 12.3–15.4)
GLOBULIN SER CALC-MCNC: 2.3 GM/DL
GLUCOSE SERPL-MCNC: 118 MG/DL (ref 65–99)
HCT VFR BLD AUTO: 41.1 % (ref 34–46.6)
HGB BLD-MCNC: 13.1 G/DL (ref 12–15.9)
MCH RBC QN AUTO: 30.1 PG (ref 26.6–33)
MCHC RBC AUTO-ENTMCNC: 31.9 G/DL (ref 31.5–35.7)
MCV RBC AUTO: 94.5 FL (ref 79–97)
PLATELET # BLD AUTO: 217 10*3/MM3 (ref 140–450)
POTASSIUM SERPL-SCNC: 4.3 MMOL/L (ref 3.5–5.2)
PROT SERPL-MCNC: 6.4 G/DL (ref 6–8.5)
RBC # BLD AUTO: 4.35 10*6/MM3 (ref 3.77–5.28)
SODIUM SERPL-SCNC: 142 MMOL/L (ref 136–145)
TSH SERPL DL<=0.005 MIU/L-ACNC: 5.09 UIU/ML (ref 0.27–4.2)
WBC # BLD AUTO: 7.16 10*3/MM3 (ref 3.4–10.8)

## 2024-08-06 RX ORDER — LEVOTHYROXINE SODIUM 0.07 MG/1
75 TABLET ORAL DAILY
Qty: 90 TABLET | Refills: 0 | Status: SHIPPED | OUTPATIENT
Start: 2024-08-06

## 2024-08-06 NOTE — TELEPHONE ENCOUNTER
----- Message from Jennifer Nichols sent at 8/6/2024  8:05 AM EDT -----  Kidney function slightly improved from last test  TSH level is higher than last test.  I do recommend increasing her Synthroid dose at this time.  I will send that to the pharmacy

## 2024-08-15 DIAGNOSIS — G30.1 LATE ONSET ALZHEIMER'S DISEASE WITH BEHAVIORAL DISTURBANCE: ICD-10-CM

## 2024-08-15 DIAGNOSIS — F02.818 LATE ONSET ALZHEIMER'S DISEASE WITH BEHAVIORAL DISTURBANCE: ICD-10-CM

## 2024-08-15 RX ORDER — MEMANTINE HYDROCHLORIDE 10 MG/1
10 TABLET ORAL 2 TIMES DAILY
Qty: 180 TABLET | Refills: 1 | OUTPATIENT
Start: 2024-08-15

## 2024-08-16 ENCOUNTER — PATIENT ROUNDING (BHMG ONLY) (OUTPATIENT)
Dept: INTERNAL MEDICINE | Facility: CLINIC | Age: 89
End: 2024-08-16
Payer: MEDICARE

## 2024-08-16 NOTE — PROGRESS NOTES
A [employer] message has been sent to patient for PATIENT ROUNDING with [facility] [place].

## 2024-11-01 ENCOUNTER — TELEPHONE (OUTPATIENT)
Dept: INTERNAL MEDICINE | Facility: CLINIC | Age: 89
End: 2024-11-01
Payer: MEDICARE

## 2024-11-01 NOTE — TELEPHONE ENCOUNTER
Called pt POA and stated that the papers were not ready for pickup and should be soon, that as soon as they are I will give them a call.

## 2024-11-01 NOTE — TELEPHONE ENCOUNTER
Caller: Denyn Maurice (POA)    Relationship: Emergency Contact    Best call back number: [phone]     What form or medical record are you requesting: SOCIAL SECURITY PAPERWORK       Additional notes: PATIENT SON STATED THAT HE DROPPED OFF THIS PAPERWORK YESTERDAY AND WOULD LIKE TO KNOW WHEN IT WILL BE DONE FOR HIM TO COME BACK AND PICKUP.

## 2024-11-05 ENCOUNTER — TELEPHONE (OUTPATIENT)
Dept: INTERNAL MEDICINE | Facility: CLINIC | Age: 89
End: 2024-11-05
Payer: MEDICARE

## 2024-11-05 DIAGNOSIS — E03.9 ACQUIRED HYPOTHYROIDISM: ICD-10-CM

## 2024-11-05 NOTE — TELEPHONE ENCOUNTER
Caller: Josafat (LEONIDAS)Denny    Relationship: Emergency Contact    Best call back number: [phone]     What is the best time to reach you: ANY    Who are you requesting to speak with (clinical staff, provider,  specific staff member): NURSE    Do you know the name of the person who called: SON, POA    What was the call regarding: SON CHECKING STATUS OF PAPERWORK FOR SOCIAL SECURITY.  IS THIS READY?    Is it okay if the provider responds through MyChart: PHONE CALL PLEASE

## 2024-11-06 ENCOUNTER — TELEPHONE (OUTPATIENT)
Dept: INTERNAL MEDICINE | Facility: CLINIC | Age: 89
End: 2024-11-06
Payer: MEDICARE

## 2024-11-06 RX ORDER — LEVOTHYROXINE SODIUM 75 UG/1
75 TABLET ORAL DAILY
Qty: 90 TABLET | Refills: 0 | Status: SHIPPED | OUTPATIENT
Start: 2024-11-06

## 2024-11-06 RX ORDER — LEVOTHYROXINE SODIUM 50 UG/1
50 TABLET ORAL DAILY
Qty: 90 TABLET | Refills: 1 | OUTPATIENT
Start: 2024-11-06

## 2024-11-06 NOTE — TELEPHONE ENCOUNTER
Called pt son on [employer] and let him know her paperwork is up front and ready for pickup. Verbalized understanding.

## 2024-12-12 DIAGNOSIS — G30.1 LATE ONSET ALZHEIMER'S DISEASE WITH BEHAVIORAL DISTURBANCE: ICD-10-CM

## 2024-12-12 DIAGNOSIS — F02.818 LATE ONSET ALZHEIMER'S DISEASE WITH BEHAVIORAL DISTURBANCE: ICD-10-CM

## 2024-12-13 RX ORDER — MIRTAZAPINE 7.5 MG/1
7.5 TABLET, FILM COATED ORAL NIGHTLY
Qty: 90 TABLET | Refills: 3 | Status: SHIPPED | OUTPATIENT
Start: 2024-12-13

## 2025-01-23 DIAGNOSIS — F32.0 CURRENT MILD EPISODE OF MAJOR DEPRESSIVE DISORDER WITHOUT PRIOR EPISODE: ICD-10-CM

## 2025-01-23 RX ORDER — QUETIAPINE FUMARATE 25 MG/1
TABLET, FILM COATED ORAL
Qty: 180 TABLET | Refills: 0 | Status: SHIPPED | OUTPATIENT
Start: 2025-01-23

## 2025-02-06 ENCOUNTER — OFFICE VISIT (OUTPATIENT)
Dept: INTERNAL MEDICINE | Facility: CLINIC | Age: OVER 89
End: 2025-02-06
Payer: MEDICARE

## 2025-02-06 VITALS
WEIGHT: 145 LBS | HEIGHT: 63 IN | DIASTOLIC BLOOD PRESSURE: 84 MMHG | HEART RATE: 62 BPM | OXYGEN SATURATION: 92 % | RESPIRATION RATE: 20 BRPM | SYSTOLIC BLOOD PRESSURE: 134 MMHG | TEMPERATURE: 97.6 F | BODY MASS INDEX: 25.69 KG/M2

## 2025-02-06 DIAGNOSIS — H61.23 BILATERAL IMPACTED CERUMEN: ICD-10-CM

## 2025-02-06 DIAGNOSIS — E03.9 ACQUIRED HYPOTHYROIDISM: ICD-10-CM

## 2025-02-06 DIAGNOSIS — G30.1 LATE ONSET ALZHEIMER'S DISEASE WITH BEHAVIORAL DISTURBANCE: ICD-10-CM

## 2025-02-06 DIAGNOSIS — F32.0 CURRENT MILD EPISODE OF MAJOR DEPRESSIVE DISORDER WITHOUT PRIOR EPISODE: ICD-10-CM

## 2025-02-06 DIAGNOSIS — E53.8 VITAMIN B 12 DEFICIENCY: ICD-10-CM

## 2025-02-06 DIAGNOSIS — E55.9 VITAMIN D DEFICIENCY: ICD-10-CM

## 2025-02-06 DIAGNOSIS — I10 ESSENTIAL HYPERTENSION: Primary | ICD-10-CM

## 2025-02-06 DIAGNOSIS — J30.1 NON-SEASONAL ALLERGIC RHINITIS DUE TO POLLEN: ICD-10-CM

## 2025-02-06 DIAGNOSIS — F02.818 LATE ONSET ALZHEIMER'S DISEASE WITH BEHAVIORAL DISTURBANCE: ICD-10-CM

## 2025-02-06 PROCEDURE — 1159F MED LIST DOCD IN RCRD: CPT | Performed by: STUDENT IN AN ORGANIZED HEALTH CARE EDUCATION/TRAINING PROGRAM

## 2025-02-06 PROCEDURE — 1160F RVW MEDS BY RX/DR IN RCRD: CPT | Performed by: STUDENT IN AN ORGANIZED HEALTH CARE EDUCATION/TRAINING PROGRAM

## 2025-02-06 PROCEDURE — 1126F AMNT PAIN NOTED NONE PRSNT: CPT | Performed by: STUDENT IN AN ORGANIZED HEALTH CARE EDUCATION/TRAINING PROGRAM

## 2025-02-06 PROCEDURE — G2211 COMPLEX E/M VISIT ADD ON: HCPCS | Performed by: STUDENT IN AN ORGANIZED HEALTH CARE EDUCATION/TRAINING PROGRAM

## 2025-02-06 PROCEDURE — 99214 OFFICE O/P EST MOD 30 MIN: CPT | Performed by: STUDENT IN AN ORGANIZED HEALTH CARE EDUCATION/TRAINING PROGRAM

## 2025-02-06 RX ORDER — LISINOPRIL 10 MG/1
10 TABLET ORAL DAILY
Qty: 90 TABLET | Refills: 1 | Status: SHIPPED | OUTPATIENT
Start: 2025-02-06

## 2025-02-06 RX ORDER — LATANOPROST 50 UG/ML
1 SOLUTION/ DROPS OPHTHALMIC NIGHTLY
Qty: 7.5 ML | Refills: 0 | Status: SHIPPED | OUTPATIENT
Start: 2025-02-06

## 2025-02-06 RX ORDER — QUETIAPINE FUMARATE 25 MG/1
25 TABLET, FILM COATED ORAL 2 TIMES DAILY
Qty: 180 TABLET | Refills: 0 | Status: SHIPPED | OUTPATIENT
Start: 2025-02-06

## 2025-02-06 RX ORDER — MONTELUKAST SODIUM 10 MG/1
10 TABLET ORAL NIGHTLY
Qty: 90 TABLET | Refills: 3 | Status: SHIPPED | OUTPATIENT
Start: 2025-02-06

## 2025-02-06 RX ORDER — MEMANTINE HYDROCHLORIDE 10 MG/1
10 TABLET ORAL 2 TIMES DAILY
Qty: 180 TABLET | Refills: 1 | Status: SHIPPED | OUTPATIENT
Start: 2025-02-06

## 2025-02-06 NOTE — PROGRESS NOTES
Subjective   Adelia Maurice is a 89 y.o. female.     History of Present Illness  Presents for follow up on chronic conditions.   Htn - well controlled  Dementia - has been progressing. Sleeping much more  Has had trouble hearing the past 18 months      The following portions of the patient's history were reviewed and updated as appropriate: allergies, current medications, past family history, past medical history, past social history, past surgical history, and problem list.    Review of Systems   All other systems reviewed and are negative.    Objective   Physical Exam  Vitals and nursing note reviewed.   Constitutional:       Appearance: Normal appearance.   HENT:      Head: Normocephalic and atraumatic.      Right Ear: External ear normal. There is impacted cerumen.      Left Ear: External ear normal. There is impacted cerumen.      Nose: Nose normal.      Mouth/Throat:      Mouth: Mucous membranes are moist.      Pharynx: Oropharynx is clear. No oropharyngeal exudate or posterior oropharyngeal erythema.   Eyes:      Extraocular Movements: Extraocular movements intact.      Conjunctiva/sclera: Conjunctivae normal.      Pupils: Pupils are equal, round, and reactive to light.   Cardiovascular:      Rate and Rhythm: Normal rate and regular rhythm.      Pulses: Normal pulses.      Heart sounds: Normal heart sounds.   Pulmonary:      Effort: Pulmonary effort is normal.      Breath sounds: Normal breath sounds.   Abdominal:      General: Abdomen is flat. Bowel sounds are normal.      Palpations: Abdomen is soft.   Musculoskeletal:         General: Normal range of motion.      Cervical back: Normal range of motion.   Skin:     General: Skin is warm.      Capillary Refill: Capillary refill takes less than 2 seconds.   Neurological:      General: No focal deficit present.      Mental Status: She is alert. Mental status is at baseline.   Psychiatric:         Mood and Affect: Mood normal.         Behavior: Behavior  normal.       Assessment & Plan   Diagnoses and all orders for this visit:    1. Essential hypertension (Primary)  -     CBC (No Diff)  -     Comprehensive Metabolic Panel  -     Lipid Panel  -     TSH  -     Vitamin B12  -     Vitamin D,25-Hydroxy  -     lisinopril (PRINIVIL,ZESTRIL) 10 MG tablet; Take 1 tablet by mouth Daily.  Dispense: 90 tablet; Refill: 1    2. Acquired hypothyroidism  -     CBC (No Diff)  -     Comprehensive Metabolic Panel  -     Lipid Panel  -     TSH  -     Vitamin B12  -     Vitamin D,25-Hydroxy    3. Late onset Alzheimer's disease with behavioral disturbance  -     CBC (No Diff)  -     Comprehensive Metabolic Panel  -     Lipid Panel  -     TSH  -     Vitamin B12  -     Vitamin D,25-Hydroxy  -     memantine (NAMENDA) 10 MG tablet; Take 1 tablet by mouth 2 (Two) Times a Day.  Dispense: 180 tablet; Refill: 1    4. Vitamin D deficiency  -     CBC (No Diff)  -     Comprehensive Metabolic Panel  -     Lipid Panel  -     TSH  -     Vitamin B12  -     Vitamin D,25-Hydroxy    5. Vitamin B 12 deficiency  -     CBC (No Diff)  -     Comprehensive Metabolic Panel  -     Lipid Panel  -     TSH  -     Vitamin B12  -     Vitamin D,25-Hydroxy    6. Non-seasonal allergic rhinitis due to pollen  -     montelukast (Singulair) 10 MG tablet; Take 1 tablet by mouth Every Night.  Dispense: 90 tablet; Refill: 3    7. Current mild episode of major depressive disorder without prior episode  -     QUEtiapine (SEROquel) 25 MG tablet; Take 1 tablet by mouth 2 (Two) Times a Day.  Dispense: 180 tablet; Refill: 0    Other orders  -     latanoprost (XALATAN) 0.005 % ophthalmic solution; Administer 1 drop to both eyes Every Night.  Dispense: 7.5 mL; Refill: 0       BMI is >= 25 and <30. (Overweight) The following options were offered after discussion;: none (medical contraindication)    Cerumen disimpaction performed by MA

## 2025-02-07 LAB
25(OH)D3+25(OH)D2 SERPL-MCNC: 23.3 NG/ML (ref 30–100)
ALBUMIN SERPL-MCNC: 4.3 G/DL (ref 3.5–5.2)
ALBUMIN/GLOB SERPL: 1.6 G/DL
ALP SERPL-CCNC: 94 U/L (ref 39–117)
ALT SERPL-CCNC: 30 U/L (ref 1–33)
AST SERPL-CCNC: 9 U/L (ref 1–32)
BILIRUB SERPL-MCNC: 0.2 MG/DL (ref 0–1.2)
BUN SERPL-MCNC: 18 MG/DL (ref 8–23)
BUN/CREAT SERPL: 15.9 (ref 7–25)
CALCIUM SERPL-MCNC: 9.1 MG/DL (ref 8.2–9.6)
CHLORIDE SERPL-SCNC: 103 MMOL/L (ref 98–107)
CHOLEST SERPL-MCNC: 189 MG/DL (ref 0–200)
CO2 SERPL-SCNC: 29.1 MMOL/L (ref 22–29)
CREAT SERPL-MCNC: 1.13 MG/DL (ref 0.57–1)
EGFRCR SERPLBLD CKD-EPI 2021: 43 ML/MIN/1.73
ERYTHROCYTE [DISTWIDTH] IN BLOOD BY AUTOMATED COUNT: 12.3 % (ref 12.3–15.4)
GLOBULIN SER CALC-MCNC: 2.7 GM/DL
GLUCOSE SERPL-MCNC: 76 MG/DL (ref 65–99)
HCT VFR BLD AUTO: 44.3 % (ref 34–46.6)
HDLC SERPL-MCNC: 34 MG/DL (ref 40–60)
HGB BLD-MCNC: 15.2 G/DL (ref 12–15.9)
LDLC SERPL CALC-MCNC: 112 MG/DL (ref 0–100)
MCH RBC QN AUTO: 31.3 PG (ref 26.6–33)
MCHC RBC AUTO-ENTMCNC: 34.3 G/DL (ref 31.5–35.7)
MCV RBC AUTO: 91.2 FL (ref 79–97)
PLATELET # BLD AUTO: 257 10*3/MM3 (ref 140–450)
POTASSIUM SERPL-SCNC: 4.6 MMOL/L (ref 3.5–5.2)
PROT SERPL-MCNC: 7 G/DL (ref 6–8.5)
RBC # BLD AUTO: 4.86 10*6/MM3 (ref 3.77–5.28)
SODIUM SERPL-SCNC: 140 MMOL/L (ref 136–145)
TRIGL SERPL-MCNC: 246 MG/DL (ref 0–150)
TSH SERPL DL<=0.005 MIU/L-ACNC: 0.92 UIU/ML (ref 0.27–4.2)
VIT B12 SERPL-MCNC: 329 PG/ML (ref 211–946)
VLDLC SERPL CALC-MCNC: 43 MG/DL (ref 5–40)
WBC # BLD AUTO: 6.09 10*3/MM3 (ref 3.4–10.8)

## 2025-02-27 DIAGNOSIS — I10 ESSENTIAL HYPERTENSION: ICD-10-CM

## 2025-02-27 RX ORDER — AMLODIPINE BESYLATE 2.5 MG/1
2.5 TABLET ORAL DAILY
Qty: 90 TABLET | Refills: 1 | Status: SHIPPED | OUTPATIENT
Start: 2025-02-27

## 2025-03-06 RX ORDER — LEVOTHYROXINE SODIUM 75 UG/1
75 TABLET ORAL DAILY
Qty: 90 TABLET | Refills: 0 | Status: SHIPPED | OUTPATIENT
Start: 2025-03-06

## 2025-05-01 ENCOUNTER — TELEPHONE (OUTPATIENT)
Dept: INTERNAL MEDICINE | Facility: CLINIC | Age: OVER 89
End: 2025-05-01

## 2025-05-01 NOTE — TELEPHONE ENCOUNTER
Caller: Denny Maurice (POA)    Relationship: Emergency Contact    Best call back number: [phone]     Which medication are you concerned about: montelukast (Singulair) 10 MG tablet     What are your concerns: PATIENTS SON STATES THEY HAVE NOT SEEN A DIFFERENCE IN THE PATIENTS SYMPTOMS SINCE SHE HAS STARTED THIS MEDICATION. THEY WOULD LIKE TO KNOW IF THEY SHOULD'VE STARTED SEEING A CHANGE BY NOW. IF SO THEY WOULD LIKE TO KNOW IF THERE IS A DIFFERENT MEDICATION THEY CAN TRY INSTEAD.

## 2025-05-12 ENCOUNTER — TELEMEDICINE (OUTPATIENT)
Dept: INTERNAL MEDICINE | Facility: CLINIC | Age: OVER 89
End: 2025-05-12
Payer: MEDICARE

## 2025-05-12 VITALS — OXYGEN SATURATION: 99 % | WEIGHT: 139 LBS | BODY MASS INDEX: 24.63 KG/M2 | HEART RATE: 89 BPM

## 2025-05-12 DIAGNOSIS — E03.9 ACQUIRED HYPOTHYROIDISM: ICD-10-CM

## 2025-05-12 DIAGNOSIS — J30.1 NON-SEASONAL ALLERGIC RHINITIS DUE TO POLLEN: Primary | ICD-10-CM

## 2025-05-12 DIAGNOSIS — F32.0 CURRENT MILD EPISODE OF MAJOR DEPRESSIVE DISORDER WITHOUT PRIOR EPISODE: ICD-10-CM

## 2025-05-12 PROCEDURE — 1126F AMNT PAIN NOTED NONE PRSNT: CPT | Performed by: STUDENT IN AN ORGANIZED HEALTH CARE EDUCATION/TRAINING PROGRAM

## 2025-05-12 PROCEDURE — 1160F RVW MEDS BY RX/DR IN RCRD: CPT | Performed by: STUDENT IN AN ORGANIZED HEALTH CARE EDUCATION/TRAINING PROGRAM

## 2025-05-12 PROCEDURE — 1159F MED LIST DOCD IN RCRD: CPT | Performed by: STUDENT IN AN ORGANIZED HEALTH CARE EDUCATION/TRAINING PROGRAM

## 2025-05-12 PROCEDURE — G2211 COMPLEX E/M VISIT ADD ON: HCPCS | Performed by: STUDENT IN AN ORGANIZED HEALTH CARE EDUCATION/TRAINING PROGRAM

## 2025-05-12 PROCEDURE — 99214 OFFICE O/P EST MOD 30 MIN: CPT | Performed by: STUDENT IN AN ORGANIZED HEALTH CARE EDUCATION/TRAINING PROGRAM

## 2025-05-12 RX ORDER — FLUTICASONE PROPIONATE 50 MCG
2 SPRAY, SUSPENSION (ML) NASAL DAILY
Qty: 16 G | Refills: 0 | Status: SHIPPED | OUTPATIENT
Start: 2025-05-12

## 2025-05-12 RX ORDER — QUETIAPINE FUMARATE 25 MG/1
25 TABLET, FILM COATED ORAL 2 TIMES DAILY
Qty: 180 TABLET | Refills: 1 | Status: SHIPPED | OUTPATIENT
Start: 2025-05-12

## 2025-05-12 RX ORDER — LEVOTHYROXINE SODIUM 75 UG/1
75 TABLET ORAL DAILY
Qty: 90 TABLET | Refills: 1 | Status: SHIPPED | OUTPATIENT
Start: 2025-05-12

## 2025-05-12 NOTE — PROGRESS NOTES
"Chief Complaint  Follow-up (Chronic nasal dripping/Hearing has gotten worse)    Subjective           Adelia Maurice presents to [facility]  History of Present Illness  Presents for follow up  Chronic nasal dripping did not improve with singulair. Could not tell any help. Happens while awake all day. Not bad at night.  Hypothyroid - no new complaints      Objective   Vital Signs:   Pulse 89   Wt 63 kg (139 lb)   SpO2 99%   BMI 24.63 kg/m²     Estimated body mass index is 24.63 kg/m² as calculated from the following:    Height as of 2/6/25: 160 cm (62.99\").    Weight as of this encounter: 63 kg (139 lb).     Physical Exam   Constitutional: She appears well-developed and well-nourished.   HENT:   Head: Normocephalic and atraumatic.   Eyes: Conjunctivae are normal.   Neck: Neck normal appearance.  Pulmonary/Chest: Effort normal.   Neurological: She is alert.   Psychiatric: She has a normal mood and affect.     Result Review :                   Assessment and Plan      Diagnoses and all orders for this visit:    1. Non-seasonal allergic rhinitis due to pollen (Primary)  -     fluticasone (FLONASE) 50 MCG/ACT nasal spray; Administer 2 sprays into the nostril(s) as directed by provider Daily.  Dispense: 16 g; Refill: 0    2. Current mild episode of major depressive disorder without prior episode  -     QUEtiapine (SEROquel) 25 MG tablet; Take 1 tablet by mouth 2 (Two) Times a Day.  Dispense: 180 tablet; Refill: 1    3. Acquired hypothyroidism  -     levothyroxine (SYNTHROID, LEVOTHROID) 75 MCG tablet; Take 1 tablet by mouth Daily.  Dispense: 90 tablet; Refill: 1        Follow Up     No follow-ups on file.  Patient was given instructions and counseling regarding her condition or for health maintenance advice. Please see specific information pulled into the AVS if appropriate.     Mode of Visit: Video  Location of patient: home  Location of provider: [facility] [place] [facility]  You have chosen to " receive care through a telehealth visit.  Does the patient consent to use a video/audio connection for your medical care today? Yes  The visit included audio and video interaction. No technical issues occurred during this visit.

## 2025-06-19 RX ORDER — LATANOPROST 50 UG/ML
SOLUTION/ DROPS OPHTHALMIC
Qty: 7.5 ML | Refills: 0 | Status: SHIPPED | OUTPATIENT
Start: 2025-06-19

## 2025-07-28 DIAGNOSIS — J30.1 NON-SEASONAL ALLERGIC RHINITIS DUE TO POLLEN: ICD-10-CM

## 2025-07-28 RX ORDER — FLUTICASONE PROPIONATE 50 MCG
SPRAY, SUSPENSION (ML) NASAL
Qty: 16 G | Refills: 1 | Status: SHIPPED | OUTPATIENT
Start: 2025-07-28

## 2025-08-21 DIAGNOSIS — F02.818 LATE ONSET ALZHEIMER'S DISEASE WITH BEHAVIORAL DISTURBANCE: ICD-10-CM

## 2025-08-21 DIAGNOSIS — I10 ESSENTIAL HYPERTENSION: ICD-10-CM

## 2025-08-21 DIAGNOSIS — G30.1 LATE ONSET ALZHEIMER'S DISEASE WITH BEHAVIORAL DISTURBANCE: ICD-10-CM

## 2025-08-21 RX ORDER — MEMANTINE HYDROCHLORIDE 10 MG/1
10 TABLET ORAL 2 TIMES DAILY
Qty: 180 TABLET | Refills: 1 | Status: SHIPPED | OUTPATIENT
Start: 2025-08-21

## 2025-08-21 RX ORDER — AMLODIPINE BESYLATE 2.5 MG/1
2.5 TABLET ORAL DAILY
Qty: 90 TABLET | Refills: 1 | Status: SHIPPED | OUTPATIENT
Start: 2025-08-21

## 2025-08-28 DIAGNOSIS — I10 ESSENTIAL HYPERTENSION: ICD-10-CM

## 2025-08-28 RX ORDER — LISINOPRIL 10 MG/1
10 TABLET ORAL DAILY
Qty: 90 TABLET | Refills: 1 | Status: SHIPPED | OUTPATIENT
Start: 2025-08-28

## (undated) DEVICE — Device

## (undated) DEVICE — KT ORCA VLV SXN AIR/H2O W/SEAL 1P/U STRL

## (undated) DEVICE — ENDOGATOR AUXILIARY WATER JET CONNECTOR: Brand: ENDOGATOR

## (undated) DEVICE — SNAR POLYP SENSATION STDOVL 27 240 BX40